# Patient Record
Sex: MALE | Race: OTHER | Employment: OTHER | URBAN - METROPOLITAN AREA
[De-identification: names, ages, dates, MRNs, and addresses within clinical notes are randomized per-mention and may not be internally consistent; named-entity substitution may affect disease eponyms.]

---

## 2023-05-01 ENCOUNTER — APPOINTMENT (OUTPATIENT)
Dept: ULTRASOUND IMAGING | Age: 68
End: 2023-05-01
Payer: COMMERCIAL

## 2023-05-01 ENCOUNTER — HOSPITAL ENCOUNTER (INPATIENT)
Age: 68
LOS: 4 days | Discharge: HOME | End: 2023-05-05
Attending: EMERGENCY MEDICINE | Admitting: EMERGENCY MEDICINE
Payer: COMMERCIAL

## 2023-05-01 ENCOUNTER — APPOINTMENT (OUTPATIENT)
Dept: CT IMAGING | Age: 68
End: 2023-05-01
Payer: COMMERCIAL

## 2023-05-01 ENCOUNTER — APPOINTMENT (OUTPATIENT)
Dept: GENERAL RADIOLOGY | Age: 68
End: 2023-05-01
Payer: COMMERCIAL

## 2023-05-01 DIAGNOSIS — M79.89 LEG SWELLING: Primary | ICD-10-CM

## 2023-05-01 DIAGNOSIS — F10.21 HISTORY OF ALCOHOLISM (HCC): ICD-10-CM

## 2023-05-01 DIAGNOSIS — R18.8 OTHER ASCITES: ICD-10-CM

## 2023-05-01 PROBLEM — K74.60 CIRRHOSIS (HCC): Status: ACTIVE | Noted: 2023-05-01

## 2023-05-01 LAB
ALBUMIN SERPL-MCNC: 2.5 G/DL (ref 3.5–5.2)
ALP SERPL-CCNC: 122 U/L (ref 40–129)
ALT SERPL-CCNC: 20 U/L (ref 0–40)
ANION GAP SERPL CALCULATED.3IONS-SCNC: 8 MMOL/L (ref 7–16)
AST SERPL-CCNC: 99 U/L (ref 0–39)
BASOPHILS # BLD: 0.04 E9/L (ref 0–0.2)
BASOPHILS NFR BLD: 0.4 % (ref 0–2)
BILIRUB DIRECT SERPL-MCNC: 2.9 MG/DL (ref 0–0.3)
BILIRUB INDIRECT SERPL-MCNC: 3.5 MG/DL (ref 0–1)
BILIRUB SERPL-MCNC: 6.4 MG/DL (ref 0–1.2)
BNP BLD-MCNC: 851 PG/ML (ref 0–125)
BUN SERPL-MCNC: 11 MG/DL (ref 6–23)
CALCIUM SERPL-MCNC: 8.7 MG/DL (ref 8.6–10.2)
CHLORIDE SERPL-SCNC: 97 MMOL/L (ref 98–107)
CO2 SERPL-SCNC: 30 MMOL/L (ref 22–29)
CREAT SERPL-MCNC: 0.5 MG/DL (ref 0.7–1.2)
EKG ATRIAL RATE: 91 BPM
EKG P AXIS: 52 DEGREES
EKG P-R INTERVAL: 142 MS
EKG Q-T INTERVAL: 418 MS
EKG QRS DURATION: 84 MS
EKG QTC CALCULATION (BAZETT): 514 MS
EKG R AXIS: 102 DEGREES
EKG T AXIS: 67 DEGREES
EKG VENTRICULAR RATE: 91 BPM
EOSINOPHIL # BLD: 0.01 E9/L (ref 0.05–0.5)
EOSINOPHIL NFR BLD: 0.1 % (ref 0–6)
ERYTHROCYTE [DISTWIDTH] IN BLOOD BY AUTOMATED COUNT: 14.6 FL (ref 11.5–15)
GLUCOSE SERPL-MCNC: 133 MG/DL (ref 74–99)
HCT VFR BLD AUTO: 35.3 % (ref 37–54)
HGB BLD-MCNC: 12 G/DL (ref 12.5–16.5)
IMM GRANULOCYTES # BLD: 0.04 E9/L
IMM GRANULOCYTES NFR BLD: 0.4 % (ref 0–5)
INR BLD: 1.8
LACTATE BLDV-SCNC: 2.6 MMOL/L (ref 0.5–2.2)
LIPASE: 31 U/L (ref 13–60)
LYMPHOCYTES # BLD: 1.62 E9/L (ref 1.5–4)
LYMPHOCYTES NFR BLD: 16.6 % (ref 20–42)
MCH RBC QN AUTO: 34.2 PG (ref 26–35)
MCHC RBC AUTO-ENTMCNC: 34 % (ref 32–34.5)
MCV RBC AUTO: 100.6 FL (ref 80–99.9)
MONOCYTES # BLD: 1.34 E9/L (ref 0.1–0.95)
MONOCYTES NFR BLD: 13.7 % (ref 2–12)
NEUTROPHILS # BLD: 6.71 E9/L (ref 1.8–7.3)
NEUTS SEG NFR BLD: 68.8 % (ref 43–80)
PLATELET # BLD AUTO: 154 E9/L (ref 130–450)
PMV BLD AUTO: 9.8 FL (ref 7–12)
POTASSIUM SERPL-SCNC: 3.3 MMOL/L (ref 3.5–5)
PROT SERPL-MCNC: 7.3 G/DL (ref 6.4–8.3)
PROTHROMBIN TIME: 19 SEC (ref 9.3–12.4)
RBC # BLD AUTO: 3.51 E12/L (ref 3.8–5.8)
SODIUM SERPL-SCNC: 135 MMOL/L (ref 132–146)
TROPONIN, HIGH SENSITIVITY: 9 NG/L (ref 0–11)
WBC # BLD: 9.8 E9/L (ref 4.5–11.5)

## 2023-05-01 PROCEDURE — 85025 COMPLETE CBC W/AUTO DIFF WBC: CPT

## 2023-05-01 PROCEDURE — 93970 EXTREMITY STUDY: CPT

## 2023-05-01 PROCEDURE — 71046 X-RAY EXAM CHEST 2 VIEWS: CPT

## 2023-05-01 PROCEDURE — 80048 BASIC METABOLIC PNL TOTAL CA: CPT

## 2023-05-01 PROCEDURE — 83690 ASSAY OF LIPASE: CPT

## 2023-05-01 PROCEDURE — 83880 ASSAY OF NATRIURETIC PEPTIDE: CPT

## 2023-05-01 PROCEDURE — 2140000000 HC CCU INTERMEDIATE R&B

## 2023-05-01 PROCEDURE — 84484 ASSAY OF TROPONIN QUANT: CPT

## 2023-05-01 PROCEDURE — 85610 PROTHROMBIN TIME: CPT

## 2023-05-01 PROCEDURE — 93005 ELECTROCARDIOGRAM TRACING: CPT | Performed by: PHYSICIAN ASSISTANT

## 2023-05-01 PROCEDURE — 99285 EMERGENCY DEPT VISIT HI MDM: CPT

## 2023-05-01 PROCEDURE — 74176 CT ABD & PELVIS W/O CONTRAST: CPT

## 2023-05-01 PROCEDURE — 93010 ELECTROCARDIOGRAM REPORT: CPT | Performed by: INTERNAL MEDICINE

## 2023-05-01 PROCEDURE — 80076 HEPATIC FUNCTION PANEL: CPT

## 2023-05-01 PROCEDURE — 83605 ASSAY OF LACTIC ACID: CPT

## 2023-05-01 RX ORDER — ENOXAPARIN SODIUM 100 MG/ML
40 INJECTION SUBCUTANEOUS DAILY
Status: DISCONTINUED | OUTPATIENT
Start: 2023-05-02 | End: 2023-05-05 | Stop reason: HOSPADM

## 2023-05-01 RX ORDER — SODIUM CHLORIDE 9 MG/ML
INJECTION, SOLUTION INTRAVENOUS PRN
Status: DISCONTINUED | OUTPATIENT
Start: 2023-05-01 | End: 2023-05-05 | Stop reason: HOSPADM

## 2023-05-01 RX ORDER — ONDANSETRON 4 MG/1
4 TABLET, ORALLY DISINTEGRATING ORAL EVERY 8 HOURS PRN
Status: DISCONTINUED | OUTPATIENT
Start: 2023-05-01 | End: 2023-05-02

## 2023-05-01 RX ORDER — ONDANSETRON 2 MG/ML
4 INJECTION INTRAMUSCULAR; INTRAVENOUS EVERY 6 HOURS PRN
Status: DISCONTINUED | OUTPATIENT
Start: 2023-05-01 | End: 2023-05-02

## 2023-05-01 RX ORDER — POLYETHYLENE GLYCOL 3350 17 G/17G
17 POWDER, FOR SOLUTION ORAL DAILY PRN
Status: DISCONTINUED | OUTPATIENT
Start: 2023-05-01 | End: 2023-05-05 | Stop reason: HOSPADM

## 2023-05-01 RX ORDER — SODIUM CHLORIDE 0.9 % (FLUSH) 0.9 %
5-40 SYRINGE (ML) INJECTION PRN
Status: DISCONTINUED | OUTPATIENT
Start: 2023-05-01 | End: 2023-05-05 | Stop reason: HOSPADM

## 2023-05-01 RX ORDER — ACETAMINOPHEN 325 MG/1
650 TABLET ORAL EVERY 6 HOURS PRN
Status: DISCONTINUED | OUTPATIENT
Start: 2023-05-01 | End: 2023-05-05 | Stop reason: HOSPADM

## 2023-05-01 RX ORDER — SODIUM CHLORIDE 0.9 % (FLUSH) 0.9 %
5-40 SYRINGE (ML) INJECTION EVERY 12 HOURS SCHEDULED
Status: DISCONTINUED | OUTPATIENT
Start: 2023-05-01 | End: 2023-05-05 | Stop reason: HOSPADM

## 2023-05-01 RX ORDER — ACETAMINOPHEN 650 MG/1
650 SUPPOSITORY RECTAL EVERY 6 HOURS PRN
Status: DISCONTINUED | OUTPATIENT
Start: 2023-05-01 | End: 2023-05-05 | Stop reason: HOSPADM

## 2023-05-01 ASSESSMENT — PAIN - FUNCTIONAL ASSESSMENT: PAIN_FUNCTIONAL_ASSESSMENT: NONE - DENIES PAIN

## 2023-05-01 ASSESSMENT — LIFESTYLE VARIABLES
HOW MANY STANDARD DRINKS CONTAINING ALCOHOL DO YOU HAVE ON A TYPICAL DAY: PATIENT DOES NOT DRINK
HOW OFTEN DO YOU HAVE A DRINK CONTAINING ALCOHOL: NEVER

## 2023-05-01 NOTE — ED PROVIDER NOTES
left pleural effusion with left basilar airspace opacities,   potentially related to atelectasis or pneumonia. Nodular opacity overlying the lower right hemithorax. This may be reflective   of a nipple shadow although a true pulmonary nodule cannot be excluded. A   follow-up chest radiograph with nipple markers or chest CT is recommended. The findings were sent to the Radiology Results [address] at 10:10   am on 5/1/2023 to be communicated to a licensed caregiver.             ------------------------- NURSING NOTES AND VITALS REVIEWED ---------------------------   The nursing notes within the ED encounter and vital signs as below have been reviewed by myself  /61   Pulse 84   Temp 98.2 °F (36.8 °C)   Resp 16   Wt 189 lb (85.7 kg)   SpO2 93%     Oxygen Saturation Interpretation: Normal    The patients available past medical records and past encounters were reviewed. ------------------------------ ED COURSE/MEDICAL DECISION MAKING----------------------  Medications - No data to display           Medical Decision Making:     Patient presents for leg swelling. Concern for DVT, CHF, ascites, among other pathologies. Hemodynamically stable, afebrile, overall well-appearing on arrival.  He had obvious ascites on my examination. Labs interpreted me shows a normal CBC, CMP shows a bilirubin of 6.4, lactic acid is 2.6, coagulation study shows an INR of 1.8. Otherwise labs reviewed as above. X-ray reviewed myself shows no obvious pneumonia or pneumothorax, radiology agrees. CT of the M pelvis shows significant ascites. Ultrasound reviewed as above. Chart reviewed. [facility]/8/2016 for elevated EtOH. He was suicidal at that time. He was cleared. Reevaluation, patient's resting comfortably.   No symptoms or complaints while at rest.  I educated him on his liver findings along with a CT, he is amenable for admission for paracentesis and

## 2023-05-01 NOTE — ED NOTES
FIRST PROVIDER CONTACT ASSESSMENT NOTE       Department of Emergency Medicine                 First Provider Note            23  9:32 AM EDT    Date of Encounter: No admission date for patient encounter. Patient Name: Coleen Mares  : 1955  MRN: [id number]    Chief Complaint: Leg Swelling (Pt states from the chest down his body is swelling. From the chest up he says he has lost muscle tone. Pt states this has been ongoing for a month. He attributed this to having a virus, but does not have a Dr. Dulce Ferrera he just let it go )      History of Present Illness:   Coleen Mares is a 74 y.o. male who presents to the ED for abdominal swelling and b/l leg swelling x 1 month. C/o BOYCE. Patient was an every day drinker of 6 shots a day of liquor until January of this year. Focused Physical Exam:  VS:    ED Triage Vitals   BP Temp Temp src Heart Rate Resp SpO2 Height Weight   -- 23 0916 -- 23 0916 23 0916 23 0916 -- 23 0927    98.2 °F (36.8 °C)  94 18 97 %  189 lb (85.7 kg)        Physical Ex: Constitutional: Alert and non-toxic. Medical History:  has no past medical history on file. Surgical History:  has no past surgical history on file. Social History:    Family History: family history is not on file. Allergies: Patient has no known allergies.      Initial Plan of Care: Initiate Treatment-Testing, Proceed toTreatment Area When Bed Available for ED Attending/MLP to Continue Care      ---END OF FIRST PROVIDER CONTACT ASSESSMENT NOTE---  Electronically signed by JUAN ALBERTO Glasgow   DD: 23       JUAN ALBERTO Glasgow  23 1296

## 2023-05-02 ENCOUNTER — APPOINTMENT (OUTPATIENT)
Dept: CT IMAGING | Age: 68
End: 2023-05-02
Payer: COMMERCIAL

## 2023-05-02 ENCOUNTER — APPOINTMENT (OUTPATIENT)
Dept: INTERVENTIONAL RADIOLOGY/VASCULAR | Age: 68
End: 2023-05-02
Payer: COMMERCIAL

## 2023-05-02 ENCOUNTER — APPOINTMENT (OUTPATIENT)
Dept: GENERAL RADIOLOGY | Age: 68
End: 2023-05-02
Payer: COMMERCIAL

## 2023-05-02 LAB
ALBUMIN FLD-MCNC: 0.6 G/DL
ALBUMIN SERPL-MCNC: 2.2 G/DL (ref 3.5–5.2)
ALP SERPL-CCNC: 98 U/L (ref 40–129)
ALT SERPL-CCNC: 16 U/L (ref 0–40)
AMYLASE FLD-CCNC: 74 U/L
ANION GAP SERPL CALCULATED.3IONS-SCNC: 9 MMOL/L (ref 7–16)
APPEARANCE FLUID: NORMAL
AST SERPL-CCNC: 87 U/L (ref 0–39)
BASOPHILS # BLD: 0.04 E9/L (ref 0–0.2)
BASOPHILS NFR BLD: 0.5 % (ref 0–2)
BILIRUB SERPL-MCNC: 4.8 MG/DL (ref 0–1.2)
BUN SERPL-MCNC: 10 MG/DL (ref 6–23)
CALCIUM SERPL-MCNC: 8.3 MG/DL (ref 8.6–10.2)
CELL COUNT FLUID TYPE: NORMAL
CHLORIDE SERPL-SCNC: 97 MMOL/L (ref 98–107)
CO2 SERPL-SCNC: 29 MMOL/L (ref 22–29)
COLOR FLUID: YELLOW
CREAT SERPL-MCNC: 0.5 MG/DL (ref 0.7–1.2)
EOSINOPHIL # BLD: 0.04 E9/L (ref 0.05–0.5)
EOSINOPHIL NFR BLD: 0.5 % (ref 0–6)
ERYTHROCYTE [DISTWIDTH] IN BLOOD BY AUTOMATED COUNT: 14.7 FL (ref 11.5–15)
FLUID TYPE: NORMAL
GLUCOSE SERPL-MCNC: 94 MG/DL (ref 74–99)
GRAM STAIN ORDERABLE: NORMAL
HCT VFR BLD AUTO: 31.3 % (ref 37–54)
HGB BLD-MCNC: 10.8 G/DL (ref 12.5–16.5)
IMM GRANULOCYTES # BLD: 0.03 E9/L
IMM GRANULOCYTES NFR BLD: 0.3 % (ref 0–5)
INR BLD: 2
LDH FLD L TO P-CCNC: 44 U/L
LDH SERPL-CCNC: 147 U/L (ref 135–225)
LYMPHOCYTES # BLD: 1.88 E9/L (ref 1.5–4)
LYMPHOCYTES NFR BLD: 21.3 % (ref 20–42)
MCH RBC QN AUTO: 34.2 PG (ref 26–35)
MCHC RBC AUTO-ENTMCNC: 34.5 % (ref 32–34.5)
MCV RBC AUTO: 99.1 FL (ref 80–99.9)
MONOCYTE, FLUID: 80 %
MONOCYTES # BLD: 1.21 E9/L (ref 0.1–0.95)
MONOCYTES NFR BLD: 13.7 % (ref 2–12)
NEUTROPHIL, FLUID: 20 %
NEUTROPHILS # BLD: 5.64 E9/L (ref 1.8–7.3)
NEUTS SEG NFR BLD: 63.7 % (ref 43–80)
NUCLEATED CELLS FLUID: 46 /UL
PLATELET # BLD AUTO: 127 E9/L (ref 130–450)
PMV BLD AUTO: 9.5 FL (ref 7–12)
POTASSIUM SERPL-SCNC: 3 MMOL/L (ref 3.5–5)
PROT FLD-MCNC: 1.6 G/DL
PROT SERPL-MCNC: 6.4 G/DL (ref 6.4–8.3)
PROT SERPL-MCNC: 6.5 G/DL (ref 6.4–8.3)
PROTHROMBIN TIME: 21.1 SEC (ref 9.3–12.4)
RBC # BLD AUTO: 3.16 E12/L (ref 3.8–5.8)
RBC FLUID: <2000 /UL
SODIUM SERPL-SCNC: 135 MMOL/L (ref 132–146)
WBC # BLD: 8.8 E9/L (ref 4.5–11.5)

## 2023-05-02 PROCEDURE — C1729 CATH, DRAINAGE: HCPCS

## 2023-05-02 PROCEDURE — 85610 PROTHROMBIN TIME: CPT

## 2023-05-02 PROCEDURE — 84155 ASSAY OF PROTEIN SERUM: CPT

## 2023-05-02 PROCEDURE — 82150 ASSAY OF AMYLASE: CPT

## 2023-05-02 PROCEDURE — 80053 COMPREHEN METABOLIC PANEL: CPT

## 2023-05-02 PROCEDURE — 87075 CULTR BACTERIA EXCEPT BLOOD: CPT

## 2023-05-02 PROCEDURE — 6360000002 HC RX W HCPCS: Performed by: EMERGENCY MEDICINE

## 2023-05-02 PROCEDURE — 2500000003 HC RX 250 WO HCPCS: Performed by: PHYSICIAN ASSISTANT

## 2023-05-02 PROCEDURE — 89051 BODY FLUID CELL COUNT: CPT

## 2023-05-02 PROCEDURE — 84157 ASSAY OF PROTEIN OTHER: CPT

## 2023-05-02 PROCEDURE — 0W9G3ZZ DRAINAGE OF PERITONEAL CAVITY, PERCUTANEOUS APPROACH: ICD-10-PCS | Performed by: RADIOLOGY

## 2023-05-02 PROCEDURE — 49083 ABD PARACENTESIS W/IMAGING: CPT

## 2023-05-02 PROCEDURE — 87205 SMEAR GRAM STAIN: CPT

## 2023-05-02 PROCEDURE — 71045 X-RAY EXAM CHEST 1 VIEW: CPT

## 2023-05-02 PROCEDURE — 6360000002 HC RX W HCPCS: Performed by: PHYSICIAN ASSISTANT

## 2023-05-02 PROCEDURE — 83615 LACTATE (LD) (LDH) ENZYME: CPT

## 2023-05-02 PROCEDURE — 85025 COMPLETE CBC W/AUTO DIFF WBC: CPT

## 2023-05-02 PROCEDURE — 6360000004 HC RX CONTRAST MEDICATION: Performed by: RADIOLOGY

## 2023-05-02 PROCEDURE — 71260 CT THORAX DX C+: CPT

## 2023-05-02 PROCEDURE — 6370000000 HC RX 637 (ALT 250 FOR IP): Performed by: EMERGENCY MEDICINE

## 2023-05-02 PROCEDURE — 36415 COLL VENOUS BLD VENIPUNCTURE: CPT

## 2023-05-02 PROCEDURE — 87070 CULTURE OTHR SPECIMN AEROBIC: CPT

## 2023-05-02 PROCEDURE — P9047 ALBUMIN (HUMAN), 25%, 50ML: HCPCS | Performed by: PHYSICIAN ASSISTANT

## 2023-05-02 PROCEDURE — 97530 THERAPEUTIC ACTIVITIES: CPT

## 2023-05-02 PROCEDURE — 82042 OTHER SOURCE ALBUMIN QUAN EA: CPT

## 2023-05-02 PROCEDURE — 97161 PT EVAL LOW COMPLEX 20 MIN: CPT

## 2023-05-02 PROCEDURE — 6370000000 HC RX 637 (ALT 250 FOR IP)

## 2023-05-02 PROCEDURE — 2580000003 HC RX 258: Performed by: RADIOLOGY

## 2023-05-02 PROCEDURE — 88305 TISSUE EXAM BY PATHOLOGIST: CPT

## 2023-05-02 PROCEDURE — 2580000003 HC RX 258: Performed by: EMERGENCY MEDICINE

## 2023-05-02 PROCEDURE — 88112 CYTOPATH CELL ENHANCE TECH: CPT

## 2023-05-02 PROCEDURE — 2140000000 HC CCU INTERMEDIATE R&B

## 2023-05-02 RX ORDER — FUROSEMIDE 10 MG/ML
40 INJECTION INTRAMUSCULAR; INTRAVENOUS ONCE
Status: COMPLETED | OUTPATIENT
Start: 2023-05-02 | End: 2023-05-02

## 2023-05-02 RX ORDER — POTASSIUM CHLORIDE 7.45 MG/ML
10 INJECTION INTRAVENOUS ONCE
Status: COMPLETED | OUTPATIENT
Start: 2023-05-02 | End: 2023-05-02

## 2023-05-02 RX ORDER — LIDOCAINE HYDROCHLORIDE 20 MG/ML
INJECTION, SOLUTION INFILTRATION; PERINEURAL PRN
Status: COMPLETED | OUTPATIENT
Start: 2023-05-02 | End: 2023-05-02

## 2023-05-02 RX ORDER — POTASSIUM CHLORIDE 20 MEQ/1
40 TABLET, EXTENDED RELEASE ORAL PRN
Status: DISCONTINUED | OUTPATIENT
Start: 2023-05-02 | End: 2023-05-05 | Stop reason: HOSPADM

## 2023-05-02 RX ORDER — SODIUM CHLORIDE 0.9 % (FLUSH) 0.9 %
10 SYRINGE (ML) INJECTION PRN
Status: DISCONTINUED | OUTPATIENT
Start: 2023-05-02 | End: 2023-05-05 | Stop reason: HOSPADM

## 2023-05-02 RX ORDER — ALBUMIN (HUMAN) 12.5 G/50ML
25 SOLUTION INTRAVENOUS ONCE
Status: COMPLETED | OUTPATIENT
Start: 2023-05-02 | End: 2023-05-02

## 2023-05-02 RX ORDER — POTASSIUM CHLORIDE 7.45 MG/ML
10 INJECTION INTRAVENOUS PRN
Status: DISCONTINUED | OUTPATIENT
Start: 2023-05-02 | End: 2023-05-05 | Stop reason: HOSPADM

## 2023-05-02 RX ORDER — POTASSIUM CHLORIDE 20 MEQ/1
20 TABLET, EXTENDED RELEASE ORAL 2 TIMES DAILY WITH MEALS
Status: DISCONTINUED | OUTPATIENT
Start: 2023-05-02 | End: 2023-05-05 | Stop reason: HOSPADM

## 2023-05-02 RX ADMIN — POTASSIUM CHLORIDE 10 MEQ: 7.45 INJECTION INTRAVENOUS at 03:44

## 2023-05-02 RX ADMIN — Medication 10 ML: at 20:39

## 2023-05-02 RX ADMIN — POTASSIUM CHLORIDE 20 MEQ: 1500 TABLET, EXTENDED RELEASE ORAL at 18:36

## 2023-05-02 RX ADMIN — POTASSIUM BICARBONATE 40 MEQ: 782 TABLET, EFFERVESCENT ORAL at 13:54

## 2023-05-02 RX ADMIN — POTASSIUM CHLORIDE 20 MEQ: 1500 TABLET, EXTENDED RELEASE ORAL at 13:54

## 2023-05-02 RX ADMIN — FUROSEMIDE 40 MG: 10 INJECTION, SOLUTION INTRAMUSCULAR; INTRAVENOUS at 03:38

## 2023-05-02 RX ADMIN — LIDOCAINE HYDROCHLORIDE 7 ML: 20 INJECTION, SOLUTION INFILTRATION; PERINEURAL at 10:52

## 2023-05-02 RX ADMIN — Medication 10 ML: at 09:09

## 2023-05-02 RX ADMIN — Medication 10 ML: at 13:55

## 2023-05-02 RX ADMIN — ALBUMIN (HUMAN) 25 G: 0.25 INJECTION, SOLUTION INTRAVENOUS at 11:38

## 2023-05-02 RX ADMIN — IOPAMIDOL 75 ML: 755 INJECTION, SOLUTION INTRAVENOUS at 09:09

## 2023-05-02 NOTE — PLAN OF CARE
Problem: Discharge Planning  Goal: Discharge to home or other facility with appropriate resources  Outcome: Progressing  Flowsheets  Taken 5/2/2023 0004  Discharge to home or other facility with appropriate resources: Identify barriers to discharge with patient and caregiver  Taken 5/1/2023 2339  Discharge to home or other facility with appropriate resources: Identify barriers to discharge with patient and caregiver     Problem: Safety - Adult  Goal: Free from fall injury  Outcome: Progressing  Flowsheets (Taken 5/2/2023 0408)  Free From Fall Injury: Instruct family/caregiver on patient safety     Problem: ABCDS Injury Assessment  Goal: Absence of physical injury  Outcome: Progressing  Flowsheets (Taken 5/2/2023 0408)  Absence of Physical Injury: Implement safety measures based on patient assessment

## 2023-05-02 NOTE — H&P (VIEW-ONLY)
frequency, urgency or hematuria  Musculoskeletal: Denies myalgias, muscle weakness, and bone pain  Neurological: Denies dizziness, vertigo, headache, and focal weakness  Psychological: Denies anxiety and depression  Endocrine: Denies heat intolerance and cold intolerance  Hematopoietic/Lymphatic: Denies bleeding problems and blood transfusions    OBJECTIVE:   BP (!) 94/41   Pulse 78   Temp 98.2 °F (36.8 °C) (Temporal)   Resp 18   Ht 5' 11\" (1.803 m)   Wt 189 lb (85.7 kg)   SpO2 98%   BMI 26.36 kg/m²   SpO2 Readings from Last 1 Encounters:   05/02/23 98%        I/O:    Intake/Output Summary (Last 24 hours) at 5/2/2023 1407  Last data filed at 5/2/2023 1150  Gross per 24 hour   Intake 99.02 ml   Output 9890 ml   Net -9790.98 ml                      Physical Exam:  General: The patient is lying in bed comfortably without any distress. Breathing is not labored  HEENT: Pupils are equal round and reactive to light, there are no oral lesions and no post-nasal drip   Neck: supple without adenopathy  Cardiovascular: regular rate and rhythm without murmur or gallop  Respiratory: Clear to auscultation bilaterally without wheezing or crackles. Air entry is symmetric  Abdomen: soft, non-tender, non-distended, normal bowel sounds  Extremities: warm, no edema, no clubbing  Skin: no rash or lesion  Neurologic: alert and oriented     Pulmonary Function Testing - none on file      Imaging personally reviewed:    Chest CT with Contrast 5/2/23:    IMPRESSION:  Moderate left pleural effusion with adjacent atelectasis. No suspicious  pulmonary nodule or mass however may be obscured due to volume of left  effusion. Visualized portions of the upper abdomen reveal at least moderate to large  volume abdominopelvic ascites partially imaged with heterogeneous appearance  of the liver concerning for underlying cirrhotic hepatic morphology.        Labs:  Lab Results   Component Value Date/Time    WBC 8.8 05/02/2023 08:49 AM    HGB

## 2023-05-02 NOTE — H&P
INTERNAL MEDICINE HISTORY AND PHYSICAL EXAM     CHIEF COMPLAINT:   Chief Complaint   Patient presents with    Leg Swelling     Pt states from the chest down his body is swelling. From the chest up he says he has lost muscle tone. Pt states this has been ongoing for a month. He attributed this to having a virus, but does not have a Dr. Chan Carter he just let it go         ASSESSMENT AND PLAN  Principal Problem:    Ascites of liver  Active Problems:    Cirrhosis ([address]  Resolved Problems:    * No resolved hospital problems. *    Decompensated liver cirrhosis  Intervention radiology consulted for diagnostic and therapeutic paracentesis  We will give Lasix now, reevaluate for continuation of IV Lasix tomorrow  He will probably need it oral diuretic regimen for volume management on discharge  Gastroenterology consultation    Lower extremity edema  Duplex ultrasound negative for DVT  Continue IV diuresis as mentioned above    Distended gallbladder  No clinical signs of acute cholecystitis  Check abdominal ultrasound  Gastroenterology consultation as mentioned above    Hypokalemia  Replace potassium    Nodular opacity lower right hemithorax/left pleural effusion with left lower lobe collapse  We will check CT chest with contrast  We will consult pulmonary medicine    Left nephrolithiasis  Will need outpatient follow-up    Colon wall thickening  Nonspecific thickening of the wall of the proximal colon  Will need colonoscopy as an outpatient    DVT prophylaxis: Subcutaneous heparin  CODE STATUS: Patient is a full code  Discharge plan: Patient can be discharged next 3 to 4 days to home with and without home health.,  Pending clinical improvement, pending work-up and clearance by consulting services. HISTORY OF PRESENTING ILLNESS:   74years old male patient with history of abdominal distention and lower extremity swelling.   Patient reports that he has history of significant alcohol intake with 4-5

## 2023-05-02 NOTE — BRIEF OP NOTE
Brief Postoperative Note  ______________________________________________________________      IR PARACENTESIS    Patient Name: Starlene Kussmaul   YOB: 1955  Medical Record Number: [id number]  Date of Procedure: 5/2/23  Room/Bed: [address] [place] [address]    Pre-operative Diagnosis: Ascites    Post-operative Diagnosis: Ascites    Consent: INFORMED CONSENT WAS OBTAINED BY patient, RISK AND BENEFITS WERE DISCUSSED. Procedure: image guided paracentesis. H and P reviewed prior to the procedure without change. Sonographic images were saved and stored in PACS. See radiology dictation in PACs for image review and additional procedural information. Performed by: JUAN ALBERTO Rotmhan under on-site supervision by Rosio Garcia MD.    Procedure: Routine scanning of all four abdominal quadrants was performed using real-time ultrasound and revealed sufficient amount of ascites fluid present. Decision was made to proceed with procedure. After obtaining consent, the patient was placed in the supine position with the head of the bed slightly elevated and the appropriate landmarks were identified. The skin over the puncture site in the right lower quadrant region was prepped with betadine and draped in a sterile fashion. Local anesthesia was obtained by infiltration using 2% Lidocaine without epinephrine. A paracentesis catheter was then advanced into the abdominal cavity over a needle and the needle was withdrawn. Fluid return was dark yellow colored. A total volume of 9100 cc was withdrawn and was processed in accordance with the ordering provider(s) requests (see Epic Orders--> Labs for laboratory order details). The catheter was then withdrawn and a sterile dressing was placed over the site. The patient tolerated the procedure well. Complications: None. Estimated Blood Loss: < 10 cc. .  Albumin 25g IV given.       Electronically signed by JUAN ALBERTO Rothman   DD: 5/2/23  11:59 AM

## 2023-05-02 NOTE — PLAN OF CARE
Problem: Safety - Adult  Goal: Free from fall injury  5/2/2023 1613 by Trista Justice RN  Outcome: Progressing     Problem: ABCDS Injury Assessment  Goal: Absence of physical injury  5/2/2023 1613 by Trista Justice RN  Outcome: Progressing

## 2023-05-02 NOTE — PATIENT CARE CONFERENCE
[facility] Quality Flow/Interdisciplinary Rounds Progress Note        Quality Flow Rounds held on May 2, 2023    Disciplines Attending:  Bedside Nurse, , , and Nursing Unit Leadership    May Fernández was admitted on 5/1/2023  1:29 PM    Anticipated Discharge Date:       Disposition:    Brian Score:  Brian Scale Score: 19    Readmission Risk              Risk of Unplanned Readmission:  10           Discussed patient goal for the day, patient clinical progression, and barriers to discharge.   The following Goal(s) of the Day/Commitment(s) have been identified:  Diagnostics - Report Results      Lupe Schultz [facility]  May 2, 2023

## 2023-05-02 NOTE — CONSULTS
Thanh Alcocer M.D.,FCCP [place] ANGELES., Clayton Gipson M.D. Henry Corona M.D. Alex Prajapati D.O. Patient:  Preethi Vazquez 74 y.o. male MRN: [id number]     Date of Service: 2023      PULMONARY CONSULTATION    Reason for Consultation: lung nodule, pleural effusion, history of smoking, please evaluate    Referring Physician: Gabriela Dawson M.D. Communication with the referring physician will be sent via the electronic medical record. Chief Complaint: leg swelling    CODE STATUS: Full code    SUBJECTIVE:  HPI:  Preethi Vazquez is a 74 y.o. male not known to us with a PMH of prostate cancer we were asked to see regarding pulmonary nodules, pleural effusion and history of smoking. Tiki Tolbert states he started to noticed swelling from the chest down with loss of muscle tone for about a month. At first, he thought it was just water weight so he though nothing of it. He states that when his wife  in , he started drinking about 6 shots a day but he quit on 2023. He does admit to smoking cigarettes 0.5 ppd. CT imaging done showed a large amount of ascites in the abdomen as well as a moderate left pleural effusion. Tiki Tolbert had a paracentesis done today in  and had 9100 ml of fluid removed. I have ordered a repeat CXR to re-evaluate the effusion to see if that still to be addressed. Past Medical History:   Diagnosis Date    Prostate cancer ([facility] [address]        No past surgical history on file. No family history on file.     Social History:   Social History     Socioeconomic History    Marital status: Single     Spouse name: Not on file    Number of children: Not on file    Years of education: Not on file    Highest education level: Not on file   Occupational History    Not on file   Tobacco Use    Smoking status: Not on file    Smokeless tobacco: Not on file   Substance and Sexual Activity    Alcohol use: Not on file    Drug use: Not on file

## 2023-05-02 NOTE — INTERVAL H&P NOTE
IR H&P UPDATE NOTE    Patient's History and Physical Examination were reviewed from current hospital admission records. Starlene Kussmaul appears likely to able to tolerate the requested Paracentesis procedure by the consultant. Risk and benefits were discussed with patient including ultimate complications, possibly death and consent was obtained.     Yoni Rothman MD.

## 2023-05-02 NOTE — PROCEDURES
Patient arrived to radiology department for paracentesis. Allergies, medications, H&P and fasting instructions reviewed. Vital signs taken. IV flushed and prn adapter attached. Procedural instructions given, questions answered, understanding expressed and consent signed.  No questions at this time

## 2023-05-03 ENCOUNTER — APPOINTMENT (OUTPATIENT)
Dept: ULTRASOUND IMAGING | Age: 68
End: 2023-05-03
Payer: COMMERCIAL

## 2023-05-03 LAB
ALBUMIN SERPL-MCNC: 2 G/DL (ref 3.5–5.2)
ALP SERPL-CCNC: 98 U/L (ref 40–129)
ALT SERPL-CCNC: 15 U/L (ref 0–40)
AMMONIA PLAS-SCNC: 55 UMOL/L (ref 16–60)
ANION GAP SERPL CALCULATED.3IONS-SCNC: 5 MMOL/L (ref 7–16)
AST SERPL-CCNC: 73 U/L (ref 0–39)
BASOPHILS # BLD: 0.06 E9/L (ref 0–0.2)
BASOPHILS NFR BLD: 0.9 % (ref 0–2)
BILIRUB SERPL-MCNC: 3.1 MG/DL (ref 0–1.2)
BUN SERPL-MCNC: 10 MG/DL (ref 6–23)
CALCIUM SERPL-MCNC: 8.2 MG/DL (ref 8.6–10.2)
CHLORIDE SERPL-SCNC: 101 MMOL/L (ref 98–107)
CO2 SERPL-SCNC: 31 MMOL/L (ref 22–29)
CREAT SERPL-MCNC: 0.6 MG/DL (ref 0.7–1.2)
EOSINOPHIL # BLD: 0.05 E9/L (ref 0.05–0.5)
EOSINOPHIL NFR BLD: 0.7 % (ref 0–6)
ERYTHROCYTE [DISTWIDTH] IN BLOOD BY AUTOMATED COUNT: 14.7 FL (ref 11.5–15)
GLUCOSE SERPL-MCNC: 89 MG/DL (ref 74–99)
HCT VFR BLD AUTO: 29.1 % (ref 37–54)
HGB BLD-MCNC: 10.2 G/DL (ref 12.5–16.5)
IMM GRANULOCYTES # BLD: 0.02 E9/L
IMM GRANULOCYTES NFR BLD: 0.3 % (ref 0–5)
LV EF: 63 %
LVEF MODALITY: NORMAL
LYMPHOCYTES # BLD: 2.25 E9/L (ref 1.5–4)
LYMPHOCYTES NFR BLD: 32.7 % (ref 20–42)
MCH RBC QN AUTO: 34.7 PG (ref 26–35)
MCHC RBC AUTO-ENTMCNC: 35.1 % (ref 32–34.5)
MCV RBC AUTO: 99 FL (ref 80–99.9)
MONOCYTES # BLD: 1.17 E9/L (ref 0.1–0.95)
MONOCYTES NFR BLD: 17 % (ref 2–12)
NEUTROPHILS # BLD: 3.34 E9/L (ref 1.8–7.3)
NEUTS SEG NFR BLD: 48.4 % (ref 43–80)
PLATELET # BLD AUTO: 112 E9/L (ref 130–450)
PMV BLD AUTO: 9.8 FL (ref 7–12)
POTASSIUM SERPL-SCNC: 3.3 MMOL/L (ref 3.5–5)
PROT SERPL-MCNC: 5.8 G/DL (ref 6.4–8.3)
RBC # BLD AUTO: 2.94 E12/L (ref 3.8–5.8)
SODIUM SERPL-SCNC: 137 MMOL/L (ref 132–146)
WBC # BLD: 6.9 E9/L (ref 4.5–11.5)

## 2023-05-03 PROCEDURE — 93306 TTE W/DOPPLER COMPLETE: CPT

## 2023-05-03 PROCEDURE — 6370000000 HC RX 637 (ALT 250 FOR IP)

## 2023-05-03 PROCEDURE — 82140 ASSAY OF AMMONIA: CPT

## 2023-05-03 PROCEDURE — 6370000000 HC RX 637 (ALT 250 FOR IP): Performed by: INTERNAL MEDICINE

## 2023-05-03 PROCEDURE — 2580000003 HC RX 258: Performed by: EMERGENCY MEDICINE

## 2023-05-03 PROCEDURE — 76700 US EXAM ABDOM COMPLETE: CPT

## 2023-05-03 PROCEDURE — 85025 COMPLETE CBC W/AUTO DIFF WBC: CPT

## 2023-05-03 PROCEDURE — 2140000000 HC CCU INTERMEDIATE R&B

## 2023-05-03 PROCEDURE — 36415 COLL VENOUS BLD VENIPUNCTURE: CPT

## 2023-05-03 PROCEDURE — 6370000000 HC RX 637 (ALT 250 FOR IP): Performed by: EMERGENCY MEDICINE

## 2023-05-03 PROCEDURE — 6360000002 HC RX W HCPCS: Performed by: EMERGENCY MEDICINE

## 2023-05-03 PROCEDURE — 80053 COMPREHEN METABOLIC PANEL: CPT

## 2023-05-03 RX ORDER — SPIRONOLACTONE 25 MG/1
25 TABLET ORAL DAILY
Status: DISCONTINUED | OUTPATIENT
Start: 2023-05-03 | End: 2023-05-05 | Stop reason: HOSPADM

## 2023-05-03 RX ORDER — PHYTONADIONE 5 MG/1
10 TABLET ORAL ONCE
Status: COMPLETED | OUTPATIENT
Start: 2023-05-03 | End: 2023-05-03

## 2023-05-03 RX ADMIN — POTASSIUM CHLORIDE 20 MEQ: 1500 TABLET, EXTENDED RELEASE ORAL at 17:30

## 2023-05-03 RX ADMIN — POTASSIUM CHLORIDE 20 MEQ: 1500 TABLET, EXTENDED RELEASE ORAL at 08:35

## 2023-05-03 RX ADMIN — ENOXAPARIN SODIUM 40 MG: 100 INJECTION SUBCUTANEOUS at 08:35

## 2023-05-03 RX ADMIN — SPIRONOLACTONE 25 MG: 25 TABLET ORAL at 11:59

## 2023-05-03 RX ADMIN — PHYTONADIONE 10 MG: 5 TABLET ORAL at 11:59

## 2023-05-03 RX ADMIN — POTASSIUM BICARBONATE 40 MEQ: 782 TABLET, EFFERVESCENT ORAL at 08:35

## 2023-05-03 RX ADMIN — Medication 10 ML: at 20:15

## 2023-05-03 RX ADMIN — Medication 10 ML: at 08:35

## 2023-05-03 NOTE — PLAN OF CARE
Problem: Discharge Planning  Goal: Discharge to home or other facility with appropriate resources  Outcome: Progressing  Flowsheets (Taken 5/2/2023 2000)  Discharge to home or other facility with appropriate resources: Identify barriers to discharge with patient and caregiver     Problem: Safety - Adult  Goal: Free from fall injury  5/3/2023 0543 by Artis Garcia RN  Outcome: Progressing  Flowsheets  Taken 5/3/2023 0126  Free From Fall Injury: Instruct family/caregiver on patient safety  Taken 5/2/2023 2000  Free From Fall Injury: Instruct family/caregiver on patient safety  5/2/2023 1613 by Trista Justice RN  Outcome: Progressing     Problem: ABCDS Injury Assessment  Goal: Absence of physical injury  5/3/2023 0543 by Artis Garcia RN  Outcome: Progressing  Flowsheets  Taken 5/3/2023 0126  Absence of Physical Injury: Implement safety measures based on patient assessment  Taken 5/2/2023 2000  Absence of Physical Injury: Implement safety measures based on patient assessment  5/2/2023 1613 by Trista Justice RN  Outcome: Progressing

## 2023-05-03 NOTE — PLAN OF CARE
Problem: Discharge Planning  Goal: Discharge to home or other facility with appropriate resources  5/3/2023 1328 by Macho Ayala RN  Outcome: Progressing     Problem: Safety - Adult  Goal: Free from fall injury  5/3/2023 1328 by Macho Ayala RN  Outcome: Progressing

## 2023-05-04 ENCOUNTER — APPOINTMENT (OUTPATIENT)
Dept: INTERVENTIONAL RADIOLOGY/VASCULAR | Age: 68
End: 2023-05-04
Payer: COMMERCIAL

## 2023-05-04 LAB
ALBUMIN SERPL-MCNC: 2.1 G/DL (ref 3.5–5.2)
ALP SERPL-CCNC: 105 U/L (ref 40–129)
ALT SERPL-CCNC: 15 U/L (ref 0–40)
ANION GAP SERPL CALCULATED.3IONS-SCNC: 9 MMOL/L (ref 7–16)
APPEARANCE FLUID: NORMAL
AST SERPL-CCNC: 78 U/L (ref 0–39)
BASOPHILS # BLD: 0.06 E9/L (ref 0–0.2)
BASOPHILS NFR BLD: 0.7 % (ref 0–2)
BILIRUB SERPL-MCNC: 3.3 MG/DL (ref 0–1.2)
BUN SERPL-MCNC: 9 MG/DL (ref 6–23)
CALCIUM SERPL-MCNC: 8.1 MG/DL (ref 8.6–10.2)
CELL COUNT FLUID TYPE: NORMAL
CHLORIDE SERPL-SCNC: 101 MMOL/L (ref 98–107)
CO2 SERPL-SCNC: 26 MMOL/L (ref 22–29)
COLOR FLUID: NORMAL
CREAT SERPL-MCNC: 0.6 MG/DL (ref 0.7–1.2)
EOSINOPHIL # BLD: 0.13 E9/L (ref 0.05–0.5)
EOSINOPHIL NFR BLD: 1.6 % (ref 0–6)
ERYTHROCYTE [DISTWIDTH] IN BLOOD BY AUTOMATED COUNT: 14.9 FL (ref 11.5–15)
FLUID TYPE: NORMAL
GLUCOSE FLD-MCNC: 92 MG/DL
GLUCOSE SERPL-MCNC: 84 MG/DL (ref 74–99)
HCT VFR BLD AUTO: 32.9 % (ref 37–54)
HGB BLD-MCNC: 11.4 G/DL (ref 12.5–16.5)
IMM GRANULOCYTES # BLD: 0.04 E9/L
IMM GRANULOCYTES NFR BLD: 0.5 % (ref 0–5)
INR BLD: 1.9
LDH FLD L TO P-CCNC: 81 U/L
LYMPHOCYTES # BLD: 2.3 E9/L (ref 1.5–4)
LYMPHOCYTES NFR BLD: 27.7 % (ref 20–42)
MCH RBC QN AUTO: 34.4 PG (ref 26–35)
MCHC RBC AUTO-ENTMCNC: 34.7 % (ref 32–34.5)
MCV RBC AUTO: 99.4 FL (ref 80–99.9)
MONOCYTE, FLUID: 90 %
MONOCYTES # BLD: 1.02 E9/L (ref 0.1–0.95)
MONOCYTES NFR BLD: 12.3 % (ref 2–12)
NEUTROPHIL, FLUID: 10 %
NEUTROPHILS # BLD: 4.76 E9/L (ref 1.8–7.3)
NEUTS SEG NFR BLD: 57.2 % (ref 43–80)
NUCLEATED CELLS FLUID: 678 /UL
PLATELET # BLD AUTO: 114 E9/L (ref 130–450)
PMV BLD AUTO: 10 FL (ref 7–12)
POTASSIUM SERPL-SCNC: 4.4 MMOL/L (ref 3.5–5)
PROT FLD-MCNC: 2.3 G/DL
PROT SERPL-MCNC: 6.2 G/DL (ref 6.4–8.3)
PROTHROMBIN TIME: 20.7 SEC (ref 9.3–12.4)
RBC # BLD AUTO: 3.31 E12/L (ref 3.8–5.8)
RBC FLUID: NORMAL /UL
SODIUM SERPL-SCNC: 136 MMOL/L (ref 132–146)
WBC # BLD: 8.3 E9/L (ref 4.5–11.5)

## 2023-05-04 PROCEDURE — C1729 CATH, DRAINAGE: HCPCS

## 2023-05-04 PROCEDURE — 97530 THERAPEUTIC ACTIVITIES: CPT

## 2023-05-04 PROCEDURE — 87070 CULTURE OTHR SPECIMN AEROBIC: CPT

## 2023-05-04 PROCEDURE — 82947 ASSAY GLUCOSE BLOOD QUANT: CPT

## 2023-05-04 PROCEDURE — 6370000000 HC RX 637 (ALT 250 FOR IP): Performed by: EMERGENCY MEDICINE

## 2023-05-04 PROCEDURE — 89051 BODY FLUID CELL COUNT: CPT

## 2023-05-04 PROCEDURE — 2140000000 HC CCU INTERMEDIATE R&B

## 2023-05-04 PROCEDURE — 87205 SMEAR GRAM STAIN: CPT

## 2023-05-04 PROCEDURE — 32555 ASPIRATE PLEURA W/ IMAGING: CPT

## 2023-05-04 PROCEDURE — 83615 LACTATE (LD) (LDH) ENZYME: CPT

## 2023-05-04 PROCEDURE — 6370000000 HC RX 637 (ALT 250 FOR IP): Performed by: INTERNAL MEDICINE

## 2023-05-04 PROCEDURE — 85025 COMPLETE CBC W/AUTO DIFF WBC: CPT

## 2023-05-04 PROCEDURE — 2500000003 HC RX 250 WO HCPCS: Performed by: PHYSICIAN ASSISTANT

## 2023-05-04 PROCEDURE — 85610 PROTHROMBIN TIME: CPT

## 2023-05-04 PROCEDURE — 84157 ASSAY OF PROTEIN OTHER: CPT

## 2023-05-04 PROCEDURE — 80053 COMPREHEN METABOLIC PANEL: CPT

## 2023-05-04 PROCEDURE — 87075 CULTR BACTERIA EXCEPT BLOOD: CPT

## 2023-05-04 PROCEDURE — 2580000003 HC RX 258: Performed by: EMERGENCY MEDICINE

## 2023-05-04 PROCEDURE — 0W9B3ZZ DRAINAGE OF LEFT PLEURAL CAVITY, PERCUTANEOUS APPROACH: ICD-10-PCS | Performed by: RADIOLOGY

## 2023-05-04 PROCEDURE — 36415 COLL VENOUS BLD VENIPUNCTURE: CPT

## 2023-05-04 RX ORDER — LIDOCAINE HYDROCHLORIDE 20 MG/ML
INJECTION, SOLUTION INFILTRATION; PERINEURAL PRN
Status: COMPLETED | OUTPATIENT
Start: 2023-05-04 | End: 2023-05-04

## 2023-05-04 RX ADMIN — Medication 10 ML: at 20:04

## 2023-05-04 RX ADMIN — POTASSIUM CHLORIDE 20 MEQ: 1500 TABLET, EXTENDED RELEASE ORAL at 17:43

## 2023-05-04 RX ADMIN — Medication 10 ML: at 10:09

## 2023-05-04 RX ADMIN — POTASSIUM CHLORIDE 20 MEQ: 1500 TABLET, EXTENDED RELEASE ORAL at 10:06

## 2023-05-04 RX ADMIN — LIDOCAINE HYDROCHLORIDE 6 ML: 20 INJECTION, SOLUTION INFILTRATION; PERINEURAL at 09:23

## 2023-05-04 RX ADMIN — SPIRONOLACTONE 25 MG: 25 TABLET ORAL at 10:06

## 2023-05-04 NOTE — PLAN OF CARE
Problem: Discharge Planning  Goal: Discharge to home or other facility with appropriate resources  5/4/2023 1555 by Zion Sanchez RN  Outcome: Progressing  Flowsheets (Taken 5/4/2023 1530)  Discharge to home or other facility with appropriate resources: Identify barriers to discharge with patient and caregiver  5/4/2023 0840 by Mirtha Barcenas RN  Outcome: Progressing     Problem: Safety - Adult  Goal: Free from fall injury  5/4/2023 1555 by Zion Sanchez RN  Outcome: Progressing  5/4/2023 0840 by Mirtha Barcenas RN  Outcome: Progressing     Problem: ABCDS Injury Assessment  Goal: Absence of physical injury  5/4/2023 1555 by Zion Sanchez RN  Outcome: Progressing  5/4/2023 0840 by Mirtha Barcenas RN  Outcome: Progressing

## 2023-05-04 NOTE — BRIEF OP NOTE
Brief Postoperative Note  ____________________________________________________________      Francisca Jackson    Patient Name: Miquel Harvey   YOB: 1955  Medical Record Number: [id number]  Date of Procedure: 5/4/23  Room/Bed: [address] [place] [address]      Procedure:  Image Guided Thoracentesis. Indication: left Pleural Effusion    Consent: The patient was counseled regarding the procedure, it's indications, risks, potential complications and alternatives and any questions were answered. Consent was obtained for image guided left thoracentesis for removal of fluid for diagnostic purposes. Performed by: JUAN ALBERTO Torres under on-site supervision by Lisa Grider MD.     Procedure: Prior to start of procedure, routine scanning of the posterior thorax was performed using real-time ultrasound and revealed sufficient amount of pleural fluid present. Decision was made to proceed with procedure. After obtaining consent, the patient was placed in the sitting position and the appropriate landmarks were identified. The skin over the puncture site in the left lower posterior hemithorax region was prepped with surgical skin prep and draped in a sterile fashion. Local anesthesia was administered by infiltration using 1% Lidocaine without epinephrine. A 6-Fr thoracocentesis catheter was then advanced into the pleural cavity over a needle and the needle was withdrawn. Pleural fluid return was serosanguinous colored. A total volume of 900 cc was withdrawn which was sent to the lab for appropriate testing. The catheter was then withdrawn and a sterile dressing was placed over the site. The patient tolerated the procedure well. Complications: None. Estimated Blood Loss: < 10 cc.         Electronically signed by JUAN ALBERTO Torres   DD: 5/4/23  10:18 AM

## 2023-05-04 NOTE — INTERVAL H&P NOTE
IR H&P UPDATE NOTE    Patient's History and Physical Examination were reviewed from current hospital admission records. Symone Shay appears likely to able to tolerate the requested Thoracentesis procedure by the consultant. Risk and benefits were discussed with patient including ultimate complications, possibly death and consent was obtained.     Yoni Soto MD.

## 2023-05-04 NOTE — PLAN OF CARE
Problem: Discharge Planning  Goal: Discharge to home or other facility with appropriate resources  5/3/2023 2313 by Xiang Nolen RN  Outcome: Progressing     Problem: Safety - Adult  Goal: Free from fall injury  5/3/2023 2313 by Xiang Nolen RN  Outcome: Progressing     Problem: ABCDS Injury Assessment  Goal: Absence of physical injury  Outcome: Progressing

## 2023-05-04 NOTE — OR NURSING
Thoracentesis catheter removed at this time, Vaseline gauze, 4x4, and tegaderm applied to site, site clean and dry

## 2023-05-04 NOTE — PROCEDURES
Patient arrived to radiology department for left thoracentesis. Allergies, medications, H&P and fasting instructions reviewed. Vital signs taken. IV flushed and prn adapter attached. Procedural instructions given, questions answered, understanding expressed and consent signed.  No questions at this time

## 2023-05-04 NOTE — PATIENT CARE CONFERENCE
[facility] Quality Flow/Interdisciplinary Rounds Progress Note        Quality Flow Rounds held on May 4, 2023    Disciplines Attending:  Bedside Nurse, , , and Nursing Unit Leadership    Preethi Vazquez was admitted on 5/1/2023  1:29 PM    Anticipated Discharge Date:       Disposition:    Brian Score:  Brian Scale Score: 20    Readmission Risk              Risk of Unplanned Readmission:  10           Discussed patient goal for the day, patient clinical progression, and barriers to discharge.   The following Goal(s) of the Day/Commitment(s) have been identified:  Diagnostics - Report Results and Labs - Report Results      Vilma Boudreaux RN  May 4, 2023

## 2023-05-05 VITALS
RESPIRATION RATE: 16 BRPM | SYSTOLIC BLOOD PRESSURE: 117 MMHG | HEIGHT: 71 IN | HEART RATE: 80 BPM | DIASTOLIC BLOOD PRESSURE: 58 MMHG | TEMPERATURE: 97.9 F | OXYGEN SATURATION: 100 % | WEIGHT: 189 LBS | BODY MASS INDEX: 26.46 KG/M2

## 2023-05-05 LAB — GRAM STAIN ORDERABLE: NORMAL

## 2023-05-05 PROCEDURE — 6360000002 HC RX W HCPCS: Performed by: EMERGENCY MEDICINE

## 2023-05-05 PROCEDURE — 2580000003 HC RX 258: Performed by: EMERGENCY MEDICINE

## 2023-05-05 PROCEDURE — 6370000000 HC RX 637 (ALT 250 FOR IP): Performed by: EMERGENCY MEDICINE

## 2023-05-05 PROCEDURE — 6370000000 HC RX 637 (ALT 250 FOR IP): Performed by: INTERNAL MEDICINE

## 2023-05-05 RX ORDER — SPIRONOLACTONE 25 MG/1
25 TABLET ORAL DAILY
Qty: 30 TABLET | Refills: 0 | Status: SHIPPED | OUTPATIENT
Start: 2023-05-06 | End: 2023-05-11

## 2023-05-05 RX ORDER — POLYETHYLENE GLYCOL 3350 17 G/17G
17 POWDER, FOR SOLUTION ORAL DAILY PRN
Qty: 527 G | Refills: 0 | Status: SHIPPED | OUTPATIENT
Start: 2023-05-05 | End: 2023-06-04

## 2023-05-05 RX ADMIN — Medication 10 ML: at 07:45

## 2023-05-05 RX ADMIN — POTASSIUM CHLORIDE 20 MEQ: 1500 TABLET, EXTENDED RELEASE ORAL at 07:44

## 2023-05-05 RX ADMIN — SPIRONOLACTONE 25 MG: 25 TABLET ORAL at 07:44

## 2023-05-05 RX ADMIN — ENOXAPARIN SODIUM 40 MG: 100 INJECTION SUBCUTANEOUS at 07:44

## 2023-05-05 NOTE — DISCHARGE SUMMARY
Hospitalist Discharge Summary    Patient ID: Macy Hernandez   Patient : 1955  Patient's PCP: No primary care provider on file. Admit Date: 2023   Admitting Physician: Tory Vega MD    Discharge Date:  2023   Discharge Physician: Zainab Miller MD   Discharge Condition: Stable  Discharge Disposition: [facility] course in brief:  (Please refer to daily progress notes for a comprehensive review of the hospitalization by requesting medical records)    74years old male patient with history of abdominal distention and lower extremity swelling. Patient reports that he has history of significant alcohol intake with 4-5 drinks of hard liquor every day and he quit in January. Patient does not have any known past medical history otherwise. Patient presented to emergency department with this abdominal distention and lower extremity swelling he otherwise has no acute complaints as such. Patient remained hemodynamically stable initial lab work-up showed elevated bilirubin hemoglobin of 12.0 otherwise no major lab abnormality. CT abdominal pelvis showing liver cirrhosis distended gallbladder noted to be opacity and left pleural effusion. Incidental finding of left nephrolithiasis and proximal colon wall thickening. Lower extremity ultrasound was negative for DVT. He is being admitted to hospital medicine service for further care. Patient underwent paracentesis and thoracentesis with improvement of his symptoms. Started on aldactone. Today patient is seen at bedside and has no new concerns. Discussed with patient that pathology report is pending on discharge. SW coordinated a PCP as patient has not seen one in years. Attached GI Dr Katty Diazial office information for outpatient follow up and surveillance of liver cirrhosis.  Patient agreed with plan and is DC to home on stable condition    Exam:  General: NAD  Neck: no JVD  Resp: CTAB  CVS: RRR  Abdomen NT, soft, NT, + BS  Ext: no

## 2023-05-05 NOTE — PLAN OF CARE
Problem: Discharge Planning  Goal: Discharge to home or other facility with appropriate resources  5/4/2023 2151 by Zehng Andrade RN  Outcome: Progressing     Problem: Safety - Adult  Goal: Free from fall injury  5/4/2023 2151 by Zheng Andrade RN  Outcome: Progressing     Problem: ABCDS Injury Assessment  Goal: Absence of physical injury  5/4/2023 2151 by Zheng Andrade RN  Outcome: Progressing

## 2023-05-05 NOTE — PROGRESS NOTES
2D echo completed by Roland Coffman
CLINICAL PHARMACY NOTE: MEDS TO BEDS    Total # of Prescriptions Filled: 2   The following medications were delivered to the patient:  ALDACTONE 25 MG   MIRALAX     Additional Documentation:   DELIVERED TO PT @ 12:50PM
Gastroenterology and Pulm. Consulted via perfect serve.
Hospitalist Progress Note      Synopsis: Patient admitted on 5/1/2023   22-year-old man with a history of abdominal distention lower extremity swelling with history of alcohol intake that he quit in January. Came in with abdominal distention and lower extremity edema. Significant mild hyperbilirubinemia and imaging showed liver cirrhosis and left-sided pleural effusion and of course left nephrolithiasis and proximal colon wall thickening. He had 1 paracentesis done with 9100 mL fluid removal.  Now also being seen by pulmonary because of the pleural effusion. Subjective    Clinically improving. Feeling better. Stable overnight. No other overnight issues reported. Exam:  BP (!) 96/50   Pulse 76   Temp 98.1 °F (36.7 °C)   Resp 18   Ht 5' 11\" (1.803 m)   Wt 189 lb (85.7 kg)   SpO2 96%   BMI 26.36 kg/m²   General appearance: No apparent distress, appears stated age and cooperative. HEENT: Pupils equal, round, and reactive to light. Conjunctivae/corneas clear. Neck: Trachea midline. Respiratory: Distant  Cardiovascular:rrr  . Abdomen: Soft, non-tender, non-distended with normal bowel sounds. Positive ascites mild  Musculoskeletal: No clubbing, cyanosis 2+  edema bilaterally.    Skin:  No rashes    Neurologic: awake, alert and following commands     Medications:  Reviewed    Infusion Medications    sodium chloride       Scheduled Medications    phytonadione  10 mg Oral Once    potassium chloride  20 mEq Oral BID WC    sodium chloride flush  5-40 mL IntraVENous 2 times per day    enoxaparin  40 mg SubCUTAneous Daily     PRN Meds: sodium chloride flush, potassium chloride **OR** potassium alternative oral replacement **OR** potassium chloride, sodium chloride flush, sodium chloride, polyethylene glycol, acetaminophen **OR** acetaminophen    I/O    Intake/Output Summary (Last 24 hours) at 5/3/2023 1128  Last data filed at 5/3/2023 1032  Gross per 24 hour   Intake 250 ml   Output 9440 ml   Net
Isabel Aguayo MD messaged regarding patient diet.
Patient ate at [phone]. Making patient NPO after midnight for ultrasound tomorrow 5/3.
Patient states he is on no home medications. Patient states he has not been to a doctor in a long time. Patient arrived to unit from ER with jacket, flannel, shirt, undergarments, short, footwear, wallet, cell phone, key, and glasses. Patient wishes to stay in street clothes and does not want to wear hospital gown at this time.
Per pulmonary request, attending notified via perfect serve if patient can be discharged today.
Per pulmonary request, attending notified via perfect serve to come speak with patient and family regarding discharge.
Physical Therapy    Initial Assessment     Name: Samson Mcdaniels  : 1955  MRN: [id number]      Date of Service: 2023    Evaluating PT: Dena Signs, PT, DPT [id number]      Room #:  4126/4864-Q  Diagnosis:  Cirrhosis ([facility] [K74.60]  Leg swelling [M79.89]  Other ascites [R18.8]  History of alcoholism ([place] [address] [F10.21]  Ascites of liver [R18.8]  PMHx/PSHx:   has a past medical history of Prostate cancer ([facility] Precautions:  Fall risk    SUBJECTIVE:    Pt lives alone in a 2 story townhouse with level entry. Full flight of stairs and 1 rail to second floor bed and bath. Pt ambulated without AD prior to admission. OBJECTIVE:   Initial Evaluation  Date: 23 Treatment Date: Short Term/ Long Term   Goals   AM-PAC 6 Clicks 72/86     Was pt agreeable to Eval/treatment? Yes     Does pt have pain? No complaints of pain     Bed Mobility  Rolling: NT  Supine to sit: Independent   Sit to supine: Independent   Scooting: Independent   NA   Transfers Sit to stand: SBA  Stand to sit: SBA  Stand pivot: SBA without AD  Sit to stand: Independent   Stand to sit: Independent   Stand pivot: Independent    Ambulation   50, 100 feet without AD with SBA  >400 feet Independent    Stair negotiation: ascended and descended 4 step(s) with 1 rail(s) with SBA  >12 step(s) with 1 rail(s) Mod Independent    ROM BUE: Refer to OT note  BLE: WFL     Strength BUE: Refer to OT note  BLE: WFL     Balance Sitting EOB: Independent   Dynamic Standing: SBA without AD  Dynamic Standing: Independent      Pt is A & O x: 4 to person, place, month/year, and situation. Sensation: Pt denies numbness and tingling of extremities. Edema: B LE swelling noted. Patient education  Pt educated on PT role in acute care setting.     Patient response to education:   Pt verbalized understanding Pt demonstrated skill Pt requires further education in this area   Yes NA No     ASSESSMENT:    Conditions Requiring Skilled Therapeutic Intervention:    [x]Decreased
Pt discharged at this time. D/c packet was provided and all questions answered to satisfaction.
Spoke with IR, due to lovenox being given today unable to do procedure.  Will hold lovenox for tomorrow 5/4
Spoke with RN regarding left thoracentesis today, patient able to sign consent, will call when able to send for patient.
Spoke with RN regarding paracentesis today. Hold lovenox this am per ordering provider. Patient is able to sign consent. Will call when able to send for patient.
Wiley for diet per Dr. Maria Teresa Martinez
Medications    sodium chloride       Scheduled Medications    potassium chloride  20 mEq Oral BID     sodium chloride flush  5-40 mL IntraVENous 2 times per day    enoxaparin  40 mg SubCUTAneous Daily     PRN Meds: sodium chloride flush, sodium chloride flush, sodium chloride, polyethylene glycol, acetaminophen **OR** acetaminophen    Labs:     Recent Labs     05/01/23  0943 05/02/23  0849   WBC 9.8 8.8   HGB 12.0* 10.8*   HCT 35.3* 31.3*    127*       Recent Labs     05/01/23  0943 05/02/23  0849    135   K 3.3* 3.0*   CL 97* 97*   CO2 30* 29   BUN 11 10   CREATININE 0.5* 0.5*   CALCIUM 8.7 8.3*       Recent Labs     05/01/23  0943 05/02/23  0849   PROT 7.3 6.5   ALKPHOS 122 98   ALT 20 16   AST 99* 87*   BILITOT 6.4* 4.8*   LIPASE 31  --        Recent Labs     05/01/23  1353   INR 1.8       No results for input(s): CKTOTAL, TROPONINI in the last 72 hours. Chronic labs:    Lab Results   Component Value Date    INR 1.8 05/01/2023       Radiology: REVIEWED DAILY    Assessment:  Ascites likely secondary to decompensated liver cirrhosis  Lower extremity edema likely secondary to above  Distended gallbladder  Nodular opacity in right lower hemothorax  Left pleural effusion with left lower lobe collapse  Left nephrolithiasis  Colon wall thickening  Hypokalemia    Plan:  Reviewed CT of abdomen and pelvis, revealing liver cirrhosis, distended gallbladder, left pleural effusion, left nephrolithiasis, proximal colon wall thickening. In light of findings agree with consult for interventional radiology for paracentesis, additionally will arrange follow-up outpatient with gastroenterology for possible outpatient colonoscopy. As well as further evaluation of new diagnosis of liver cirrhosis. Paracentesis completed today we will consider additional IV Lasix. We will need oral diuretic on discharge. Reviewed CT of chest that revealed pulmonary nodule agree with consultation placed for pulmonology.
month/year, and situation. Sensation: Pt denies numbness and tingling of extremities. Edema: Generalized swelling has improved since initial evaluation. Patient education  Pt educated on PT role in acute care setting. Patient response to education:   Pt verbalized understanding Pt demonstrated skill Pt requires further education in this area   Yes NA No     ASSESSMENT:    Comments:    Pt was in bed upon room entry; agreeable to PT treatment. Pt ambulated around unit. Gait was steady. Pt negotiated steps and was also steady. Pt ambulated back to room. O2 sat was 92-95% on RA with all activity. Pt denied SOB but did have cough. All questions and concerns were addressed. Pt was left in bed with all needs met at conclusion of session. Treatment:  Patient practiced and was instructed in the following treatment:    Therapeutic activities:  Ambulation: Pt ambulated extended distance x2 reps without AD. Pt was cued for safety. Stairs: Pt was cued for safety on stairs. Vitals and symptoms were closely monitored throughout session. PLAN:    Patient is making Good progress towards established goals. Will continue with current POC.       Time in: 1030  Time out: 1055    Total Treatment Time 25 minutes     CPT codes:  [] Gait training 71027 0 minutes  [] Manual therapy 74210 0 minutes  [x] Therapeutic activities 10003 25 minutes  [] Therapeutic exercises 08649 0 minutes  [] Neuromuscular reeducation 50835 0 minutes      Nara Smyth PT, DPT   [id number]
spironolactone  25 mg Oral Daily    potassium chloride  20 mEq Oral BID WC    sodium chloride flush  5-40 mL IntraVENous 2 times per day    enoxaparin  40 mg SubCUTAneous Daily       Physical Exam:  General Appearance: appears comfortable in no acute distress. HEENT: Normocephalic atraumatic without obvious abnormality   Neck: Lips, mucosa, and tongue normal.  Supple, symmetrical, trachea midline, no adenopathy;thyroid:  no enlargement/tenderness/nodules or JVD. Lung: Breath sounds CTA. Respirations   unlabored. Symmetrical expansion. Heart: RRR, normal S1, S2. No MRG  Abdomen: Soft, NT, ND. BS present x 4 quadrants. No bruit or organomegaly. Extremities: Pedal pulses 2+ symmetric b/l. Extremities normal, no cyanosis, clubbing, or edema. Musculokeletal: No joint swelling, no muscle tenderness. ROM normal in all joints of extremities. Neurologic: Mental status: Alert and Oriented X3 . Pertinent/ New Labs and Imaging Studies     Imaging Personally Reviewed:    Chest CT with Contrast 5/2/23:    IMPRESSION:  Moderate left pleural effusion with adjacent atelectasis. No suspicious  pulmonary nodule or mass however may be obscured due to volume of left  effusion. Visualized portions of the upper abdomen reveal at least moderate to large  volume abdominopelvic ascites partially imaged with heterogeneous appearance  of the liver concerning for underlying cirrhotic hepatic morphology.     Labs:  Lab Results   Component Value Date/Time    WBC 6.9 05/03/2023 04:19 AM    HGB 10.2 05/03/2023 04:19 AM    HCT 29.1 05/03/2023 04:19 AM    MCV 99.0 05/03/2023 04:19 AM    MCH 34.7 05/03/2023 04:19 AM    MCHC 35.1 05/03/2023 04:19 AM    RDW 14.7 05/03/2023 04:19 AM     05/03/2023 04:19 AM    MPV 9.8 05/03/2023 04:19 AM     Lab Results   Component Value Date/Time     05/03/2023 04:19 AM    K 3.3 05/03/2023 04:19 AM     05/03/2023 04:19 AM    CO2 31 05/03/2023 04:19 AM    BUN 10 05/03/2023 04:19 AM
LABALBU 2.1 05/04/2023 05:15 AM    CALCIUM 8.1 05/04/2023 05:15 AM    LABGLOM >60 05/04/2023 05:15 AM     Lab Results   Component Value Date/Time    PROTIME 20.7 05/04/2023 05:15 AM    INR 1.9 05/04/2023 05:15 AM     No results for input(s): PROBNP in the last 72 hours. No results for input(s): PROCAL in the last 72 hours. This SmartLink has not been configured with any valid records. Micro:  No results for input(s): CULTRESP in the last 72 hours. Recent Labs     05/02/23  1054   LABGRAM Refer to ordered Gram stain for results       No results for input(s): LEGUR in the last 72 hours. No results for input(s): STREPNEUMAGU in the last 72 hours. No results for input(s): LP1UAG in the last 72 hours. Assessment:    Moderate left pleural effusion  Current nicotine dependence  Large ascites-s/p paracentesis 9100 ml removed 5/2/23  Liver cirrhosis    Plan:   Continue to monitor on room air  Follow echocardiogram  CXR reviewed  IR for right thoracentesis today, 900cc removed  Tobacco cessation recommended    OK to discharge tomorrow per Pulmonology    This plan of care was reviewed in collaboration with Dr. Aren Mir    Electronically signed by HEIDE Wynn CNP on 5/4/2023 at 12:49 PM      Addendum:  Patient just returned from left-sided thoracentesis. Total of 900 cc fluid was removed. Fluid chemistry consistent with transudate. Patient currently is on room air. In no acute distress. We will follow final pathology. I personally saw, examined and provided care for the patient. Radiographs, labs and medication list were reviewed by me independently. I spoke with bedside nursing, therapists and consultants. The case was discussed in detail and plans for care were established. Review of CNP documentation was conducted and revisions were made as appropriate. I agree with the above documented exam, problem list and plan of care.    Andry Vázquez MD   He is stable to be discharged
+++++++++++++++++++++++++++++++++++++++++++++++++  Madi Cleveland MD   [facility].  +++++++++++++++++++++++++++++++++++++++++++++++++  NOTE: This report was transcribed using voice recognition software. Every effort was made to ensure accuracy; however, inadvertent computerized transcription errors may be present.

## 2023-05-05 NOTE — PATIENT CARE CONFERENCE
P Quality Flow/Interdisciplinary Rounds Progress Note        Quality Flow Rounds held on May 5, 2023    Disciplines Attending:  Bedside Nurse, , , and Nursing Unit Leadership    Jeff Castillo was admitted on 5/1/2023  1:29 PM    Anticipated Discharge Date:       Disposition:    Brian Score:  Brian Scale Score: 20    Readmission Risk              Risk of Unplanned Readmission:  11           Discussed patient goal for the day, patient clinical progression, and barriers to discharge.   The following Goal(s) of the Day/Commitment(s) have been identified:  Activity Progression  increase and Labs - Report Results      Micki Darnell RN  May 5, 2023

## 2023-05-05 NOTE — CARE COORDINATION
5/3/23  Transition of Care Update. Patient admitted for Cirrhosis and leg swelling. Patient is pending an ECHO. Patient had a US of lower ext which was negative for DVT, Patient had a successful IR guided paracentesis for ascites. Patient continues to pend gastroenterology consult. Patient noted to have a lung nodule as well as left pleural effusion and being followed by Pulmonology. Patient was evaluated by therapy with AM-PAC score of 20/24. Discharge goal is to return home with no needs pending course of treatment. DVO/Erich to follow.     Electronically signed by SAMARIA Durham on 5/3/2023 at 9:11 AM
5/4/23  Transition of Care Update. Patient admitted for Cirrhosis and leg swelling. Patient is being followed by  Pulmonary and  IR with consult to gastroenterology pending. Patient is for a right thoracentesis today and potassium is being replaced. Patient is independent from home and discharge goal is home with no needs when medically stable.       Electronically signed by SAMARIA Ramírez on 5/4/2023 at 10:48 AM
5/5/23 Transition of Care Update. Patient admitted for Cirrhosis and leg swelling. Patient is being followed by  Pulmonary and  IR with consult to gastroenterology pending. Patient is s/p Right thoracentesis. Patient is on room air. Patient is independent from home and discharge goal is home with no needs when medically stable. Patient states one of this friends will provide transport home on discharge. Sw/Cm to follow.     Electronically signed by SAMARIA Alonzo on 5/5/2023 at 7:42 AM
provider on file. PCP verified by CM? No (no PCP but agreeable to having one set up.)    Chart Reviewed: Yes      History Provided by: Patient  Patient Orientation: Alert and Oriented, Person, Place, Situation    Patient Cognition: Alert    Hospitalization in the last 30 days (Readmission):  No    If yes, Readmission Assessment in  Navigator will be completed. Advance Directives:      Code Status: Full Code   Patient's Primary Decision Maker is:        Discharge Planning:    Patient lives with: Alone Type of Home: House  Primary Care Giver: Self  Patient Support Systems include: None   Current Financial resources:    Current community resources:    Current services prior to admission: None            Current DME:              Type of Home Care services:  None    ADLS  Prior functional level: Independent in ADLs/IADLs  Current functional level: Assistance with the following:, Mobility    PT AM-PAC: 20 /24  OT AM-PAC: /24    Family can provide assistance at DC: No  Would you like Case Management to discuss the discharge plan with any other family members/significant others, and if so, who?  No  Plans to Return to Present Housing: Yes  Other Identified Issues/Barriers to RETURNING to current housing: none noted at this time  Potential Assistance needed at discharge: N/A            Potential DME:    Patient expects to discharge to: [address] [place] [address] for transportation at discharge:      Financial    Payor: [employer] [address] / Plan: [address] [place] [address] PPO / Product Type: *No Product type* /     Does insurance require precert for SNF: Yes    Potential assistance Purchasing Medications:    Meds-to-Beds request: Yes    No Pharmacies Listed    Notes:    Factors facilitating achievement of predicted outcomes: Friend support, Motivated, Cooperative, and Pleasant    Barriers to discharge: Medical complications    Additional Case Management Notes: See above    The Plan for Transition of Care is related to the following treatment goals of Cirrhosis

## 2023-05-06 LAB
BACTERIA FLD AEROBE CULT: NORMAL
BACTERIA FLD AEROBE CULT: NORMAL
BACTERIA SPEC ANAEROBE CULT: NORMAL
GRAM STN SPEC: NORMAL
GRAM STN SPEC: NORMAL

## 2023-05-07 LAB — BACTERIA SPEC ANAEROBE CULT: NORMAL

## 2023-05-09 LAB — BACTERIA SPEC ANAEROBE CULT: NORMAL

## 2023-05-11 ENCOUNTER — OFFICE VISIT (OUTPATIENT)
Dept: FAMILY MEDICINE CLINIC | Age: 68
End: 2023-05-11
Payer: COMMERCIAL

## 2023-05-11 VITALS
WEIGHT: 172 LBS | HEART RATE: 87 BPM | BODY MASS INDEX: 24.08 KG/M2 | RESPIRATION RATE: 16 BRPM | OXYGEN SATURATION: 97 % | HEIGHT: 71 IN | DIASTOLIC BLOOD PRESSURE: 58 MMHG | TEMPERATURE: 98.3 F | SYSTOLIC BLOOD PRESSURE: 116 MMHG

## 2023-05-11 DIAGNOSIS — Z13.220 ENCOUNTER FOR SCREENING FOR LIPID DISORDER: ICD-10-CM

## 2023-05-11 DIAGNOSIS — Z85.46 HISTORY OF PROSTATE CANCER: ICD-10-CM

## 2023-05-11 DIAGNOSIS — Z91.89 ENCOUNTER FOR HEPATITIS C VIRUS SCREENING TEST FOR HIGH RISK PATIENT: ICD-10-CM

## 2023-05-11 DIAGNOSIS — Z11.4 ENCOUNTER FOR SCREENING FOR HIV: ICD-10-CM

## 2023-05-11 DIAGNOSIS — K70.31 ALCOHOLIC CIRRHOSIS OF LIVER WITH ASCITES (HCC): Primary | ICD-10-CM

## 2023-05-11 DIAGNOSIS — Z11.59 ENCOUNTER FOR HEPATITIS C VIRUS SCREENING TEST FOR HIGH RISK PATIENT: ICD-10-CM

## 2023-05-11 PROCEDURE — 1123F ACP DISCUSS/DSCN MKR DOCD: CPT

## 2023-05-11 PROCEDURE — 99213 OFFICE O/P EST LOW 20 MIN: CPT

## 2023-05-11 RX ORDER — SPIRONOLACTONE 50 MG/1
50 TABLET, FILM COATED ORAL 2 TIMES DAILY
Qty: 180 TABLET | Refills: 1 | Status: SHIPPED | OUTPATIENT
Start: 2023-05-11

## 2023-05-11 SDOH — ECONOMIC STABILITY: INCOME INSECURITY: HOW HARD IS IT FOR YOU TO PAY FOR THE VERY BASICS LIKE FOOD, HOUSING, MEDICAL CARE, AND HEATING?: NOT HARD AT ALL

## 2023-05-11 SDOH — ECONOMIC STABILITY: FOOD INSECURITY: WITHIN THE PAST 12 MONTHS, THE FOOD YOU BOUGHT JUST DIDN'T LAST AND YOU DIDN'T HAVE MONEY TO GET MORE.: NEVER TRUE

## 2023-05-11 SDOH — ECONOMIC STABILITY: HOUSING INSECURITY
IN THE LAST 12 MONTHS, WAS THERE A TIME WHEN YOU DID NOT HAVE A STEADY PLACE TO SLEEP OR SLEPT IN A SHELTER (INCLUDING NOW)?: NO

## 2023-05-11 SDOH — ECONOMIC STABILITY: FOOD INSECURITY: WITHIN THE PAST 12 MONTHS, YOU WORRIED THAT YOUR FOOD WOULD RUN OUT BEFORE YOU GOT MONEY TO BUY MORE.: NEVER TRUE

## 2023-05-11 ASSESSMENT — ENCOUNTER SYMPTOMS
NAUSEA: 0
COLOR CHANGE: 1
PHOTOPHOBIA: 0
ABDOMINAL DISTENTION: 1
SHORTNESS OF BREATH: 0
WHEEZING: 0
ABDOMINAL PAIN: 0
FACIAL SWELLING: 0
VOMITING: 0
CHEST TIGHTNESS: 0

## 2023-05-11 ASSESSMENT — PATIENT HEALTH QUESTIONNAIRE - PHQ9
2. FEELING DOWN, DEPRESSED OR HOPELESS: 0
SUM OF ALL RESPONSES TO PHQ QUESTIONS 1-9: 0
1. LITTLE INTEREST OR PLEASURE IN DOING THINGS: 0
SUM OF ALL RESPONSES TO PHQ QUESTIONS 1-9: 0
SUM OF ALL RESPONSES TO PHQ9 QUESTIONS 1 & 2: 0

## 2023-05-11 NOTE — PROGRESS NOTES
Patient is a 62-year-old male with a past medical history of alcoholism, prostate cancer status post prostatectomy in  who presents today to establish care and for hospital follow-up. The patient was recently admitted to the hospital with peripheral edema and worsening abdominal swelling with accompanying shortness of breath. Work-up at that time revealed liver cirrhosis with abdominal ascites and pleural effusion. Patient had thoracentesis and paracentesis with relief of symptoms. The patient was discharged on spironolactone 25 mg daily. The patient reports that since his discharge from the hospital.  His abdomen and legs have not had worsening swelling. However, he states that he has not had a large response to the spironolactone, only urinating twice daily. He states that while the swelling in his abdomen and legs is not increased, there is still some moderate swelling that has not yet been relieved. He denies chest pain, shortness of breath, abdominal pain, nausea/vomiting, myalgias/arthralgias, lightheadedness, and dizziness. He does endorse mild fatigue. The patient has a history of alcoholism. He states that he would previously drink 3 beers a day, then several years ago when his wife , he began drinking 4-6 shots of whiskey daily. He states that at the start of the year he quit drinking altogether. At that time he reports mild tremors but denies further withdrawal symptoms. Additional social history, the patient is smoked half pack of cigarettes per day for approximately 50 years. The patient has not seen a primary care provider in approximately 6 years. Past medical history only notable for alcoholism and prostate cancer. Past surgical history only includes radical prostatectomy in 2006. Family history notable for diabetes mellitus in mother. Social history notable only as noted above.     Blood pressure (!) 116/58, pulse 87, temperature 98.3 °F (36.8 °C), temperature source

## 2023-05-11 NOTE — PROGRESS NOTES
[facility] RESIDENCY PROGRAM  DATE OF VISIT : 2023    Patient : Cherrie Johnson   Age : 74 y.o.  : 1955   MRN : [id number]   ______________________________________________________________________    Chief Complaint:   Chief Complaint   Patient presents with    New Patient       HPI:   Cherrie Johnson is a 74 y.o. male with hx alcoholism, prostate cancer s/p prostatectomy in  who presents today to establish care and for hospital follow-up. The patient was recently admitted to the hospital with peripheral edema and abdominal swelling. Patient had thoracentesis and paracentesis at that time with relief of his symptoms. He was started on spironolactone 25 mg at discharge. The patient states that since starting the spironolactone, his daily urine output has not increased. He is only urinating twice daily. He states that his legs and abdomen are still mildly swollen but have not worsened since he left the hospital. Reports he has also been feeling mildly fatigued since he first presented to the hospital, and he feels weaker overall. The patient has a history of alcoholism. He states that he would previously drink 3 beers daily, then once his wife passed away several years ago, he began drinking 4-6 shots of whiskey daily. He states that at the start of the year, he quit drinking. He had a tremor associated with stopping the drinking, but never any further withdrawal symptoms.        Past Medical History:  Past Medical History:   Diagnosis Date    Alcoholism ([facility]     Prostate cancer [facility])        Past Surgical History:  Past Surgical History:   Procedure Laterality Date    PROSTATECTOMY           Family History:  Family History   Problem Relation Age of Onset    Diabetes Mother        Social History:  Social History     Socioeconomic History    Marital status: Single     Spouse name: None    Number of children: None    Years of education: None    Highest

## 2023-08-25 ENCOUNTER — OFFICE VISIT (OUTPATIENT)
Dept: FAMILY MEDICINE CLINIC | Age: 68
End: 2023-08-25
Payer: COMMERCIAL

## 2023-08-25 VITALS
RESPIRATION RATE: 17 BRPM | OXYGEN SATURATION: 98 % | DIASTOLIC BLOOD PRESSURE: 59 MMHG | WEIGHT: 158 LBS | HEART RATE: 73 BPM | BODY MASS INDEX: 22.12 KG/M2 | TEMPERATURE: 99 F | HEIGHT: 71 IN | SYSTOLIC BLOOD PRESSURE: 120 MMHG

## 2023-08-25 DIAGNOSIS — Z11.4 ENCOUNTER FOR SCREENING FOR HIV: ICD-10-CM

## 2023-08-25 DIAGNOSIS — C61 PROSTATE CANCER (HCC): ICD-10-CM

## 2023-08-25 DIAGNOSIS — Z85.46 HISTORY OF PROSTATE CANCER: ICD-10-CM

## 2023-08-25 DIAGNOSIS — Z13.220 ENCOUNTER FOR SCREENING FOR LIPID DISORDER: ICD-10-CM

## 2023-08-25 DIAGNOSIS — Z11.59 ENCOUNTER FOR HEPATITIS C VIRUS SCREENING TEST FOR HIGH RISK PATIENT: ICD-10-CM

## 2023-08-25 DIAGNOSIS — Z91.89 ENCOUNTER FOR HEPATITIS C VIRUS SCREENING TEST FOR HIGH RISK PATIENT: ICD-10-CM

## 2023-08-25 DIAGNOSIS — R62.7 POOR WEIGHT GAIN IN ADULT: Primary | ICD-10-CM

## 2023-08-25 DIAGNOSIS — K70.31 ALCOHOLIC CIRRHOSIS OF LIVER WITH ASCITES (HCC): ICD-10-CM

## 2023-08-25 DIAGNOSIS — Z12.11 COLON CANCER SCREENING: ICD-10-CM

## 2023-08-25 DIAGNOSIS — Z87.891 PERSONAL HISTORY OF TOBACCO USE: ICD-10-CM

## 2023-08-25 LAB
ALBUMIN SERPL-MCNC: 3 G/DL (ref 3.5–5.2)
ALP BLD-CCNC: 164 U/L (ref 40–129)
ALT SERPL-CCNC: 13 U/L (ref 0–40)
ANION GAP SERPL CALCULATED.3IONS-SCNC: 7 MMOL/L (ref 7–16)
AST SERPL-CCNC: 85 U/L (ref 0–39)
BILIRUB SERPL-MCNC: 1.8 MG/DL (ref 0–1.2)
BUN BLDV-MCNC: 10 MG/DL (ref 6–23)
CALCIUM SERPL-MCNC: 8.9 MG/DL (ref 8.6–10.2)
CHLORIDE BLD-SCNC: 102 MMOL/L (ref 98–107)
CO2: 26 MMOL/L (ref 22–29)
CREAT SERPL-MCNC: 0.6 MG/DL (ref 0.7–1.2)
GFR SERPL CREATININE-BSD FRML MDRD: >60 ML/MIN/1.73M2
GLUCOSE BLD-MCNC: 91 MG/DL (ref 74–99)
POTASSIUM SERPL-SCNC: 3.9 MMOL/L (ref 3.5–5)
SODIUM BLD-SCNC: 135 MMOL/L (ref 132–146)
TOTAL PROTEIN: 8.1 G/DL (ref 6.4–8.3)

## 2023-08-25 PROCEDURE — 99213 OFFICE O/P EST LOW 20 MIN: CPT

## 2023-08-25 PROCEDURE — 1123F ACP DISCUSS/DSCN MKR DOCD: CPT

## 2023-08-26 LAB
CHOLESTEROL: 173 MG/DL
HDLC SERPL-MCNC: 71 MG/DL
LDL CHOLESTEROL: 83 MG/DL
PROSTATE SPECIFIC ANTIGEN: <0.01 NG/ML (ref 0–4)
TRIGL SERPL-MCNC: 93 MG/DL
VLDLC SERPL CALC-MCNC: 19 MG/DL

## 2023-08-28 ENCOUNTER — TELEPHONE (OUTPATIENT)
Dept: FAMILY MEDICINE CLINIC | Age: 68
End: 2023-08-28

## 2023-08-28 LAB
HEPATITIS C ANTIBODY: NONREACTIVE
HIV AG/AB: NONREACTIVE

## 2023-09-06 ENCOUNTER — INITIAL CONSULT (OUTPATIENT)
Dept: GASTROENTEROLOGY | Age: 68
End: 2023-09-06
Payer: COMMERCIAL

## 2023-09-06 ENCOUNTER — TELEPHONE (OUTPATIENT)
Dept: GASTROENTEROLOGY | Age: 68
End: 2023-09-06

## 2023-09-06 ENCOUNTER — PREP FOR PROCEDURE (OUTPATIENT)
Dept: GASTROENTEROLOGY | Age: 68
End: 2023-09-06

## 2023-09-06 VITALS
TEMPERATURE: 97 F | SYSTOLIC BLOOD PRESSURE: 110 MMHG | HEART RATE: 48 BPM | BODY MASS INDEX: 22.46 KG/M2 | OXYGEN SATURATION: 100 % | WEIGHT: 160.4 LBS | HEIGHT: 71 IN | RESPIRATION RATE: 16 BRPM | DIASTOLIC BLOOD PRESSURE: 68 MMHG

## 2023-09-06 DIAGNOSIS — Z12.11 COLON CANCER SCREENING: Primary | ICD-10-CM

## 2023-09-06 DIAGNOSIS — Z12.11 SPECIAL SCREENING FOR MALIGNANT NEOPLASMS, COLON: ICD-10-CM

## 2023-09-06 DIAGNOSIS — K70.31 ALCOHOLIC CIRRHOSIS OF LIVER WITH ASCITES (HCC): ICD-10-CM

## 2023-09-06 DIAGNOSIS — Z85.46 HISTORY OF PROSTATE CANCER: ICD-10-CM

## 2023-09-06 PROBLEM — I85.00 ESOPHAGEAL VARICES WITHOUT BLEEDING (HCC): Status: ACTIVE | Noted: 2023-09-06

## 2023-09-06 PROCEDURE — 1123F ACP DISCUSS/DSCN MKR DOCD: CPT | Performed by: STUDENT IN AN ORGANIZED HEALTH CARE EDUCATION/TRAINING PROGRAM

## 2023-09-06 PROCEDURE — 99204 OFFICE O/P NEW MOD 45 MIN: CPT | Performed by: STUDENT IN AN ORGANIZED HEALTH CARE EDUCATION/TRAINING PROGRAM

## 2023-09-06 RX ORDER — SOD SULF/POT CHLORIDE/MAG SULF 1.479 G
1 TABLET ORAL ONCE
Qty: 24 TABLET | Refills: 0 | Status: SHIPPED | OUTPATIENT
Start: 2023-09-06 | End: 2023-09-06

## 2023-09-06 RX ORDER — FUROSEMIDE 40 MG/1
40 TABLET ORAL 2 TIMES DAILY
Qty: 60 TABLET | Refills: 3 | Status: SHIPPED | OUTPATIENT
Start: 2023-09-06

## 2023-09-06 NOTE — TELEPHONE ENCOUNTER
Prior Authorization Form:      DEMOGRAPHICS:                     Patient Name:  Warden Marin  Patient :  1955            Insurance:  Payor: [employer] / Plan: [employer] - [place] PPO / Product Type: *No Product type* /   Insurance ID Number:    Payer/Plan Subscr  Sex Relation Sub. Ins. ID Effective Group Num   1.  [address] 1955 Male Self [id number]                                    [id number]         DIAGNOSIS & PROCEDURE:                       Procedure/Operation: egd, colonoscopy           CPT Code: 88959, (66676 possible banding) 59417    Diagnosis:  esophageal varices, screening    ICD10 Code: I85.00, z12.11    Location:  [facility]    Surgeon:  Juliana Mcintosh INFORMATION:                          Date: 2024    Time: tbd              Anesthesia:  MAC/TIVA                                                       Status:  Outpatient        Special Comments:  na       Electronically signed by Alfonso Funk LPN on 179 at 5:99 PM

## 2023-09-06 NOTE — PROGRESS NOTES
[facility] ([place] campus)   Gastroenterology, Hepatology, &  Advanced Endoscopy    Consult       ASSESSMENT AND PLAN:    68y/M w/ EtOH cirrhosis presents to clinic to establish care. MELD: 19    PLAN:  Cirrhosis:  - Serologic testing   - CBC, CMP, and INR today  - If MELD remains elevated, may need transplant evaluation.   - US in Nov for [address] screening     2. Ascites:  - Will d/c spironolactone as he has gynecomastia likely 2/2 to medication though possible due to cirrhosis itself. - Will transition to Lasix BID. Monitor BMP in 2 weeks. - Will begin scheduling for IR guided paracentesis. 3. Variceal Screening:  - Will schedule for EGD. 4. Colon Cancer Screening:  - Will attempt to schedule Colonoscopy at time of EGD if okay with Dr. Angelina Hathaway. I will see the patient back in clinic in 4-6 weeks. Thank you for including us in the care of this patient. Please do not hesitate to contact us with any additional questions or concerns. Mariana Akers MD  Gastroenterology/Hepatology  Advanced Endoscopy      HISTORY OF PRESENT ILLNESS:      Mr. Reeta Litten is a 68y/M w/ history of prostate cancer s/p prostatectomy in  and recent hospitalization for EtOH cirrhosis c/b pleural effusions and ascites w/ thoracentesis performed and paracentesis with removal of 9L. He was d/c on Lasix and Spironolactone. He had a heavy and regular drinking history starting in  when his wife . He stopped in 2023. His most recent labs show Na 135, Cr 0.6, Tbili 1.8, INR 1.9 for a MELD of 19. The patient reports that last year his lab results revealed markedly elevated PSA and he sought care at [facility] and was told that his prostate cancer is likely recurring. He was given radiation therapy in Oct 2022 and by 2023 he was told that the cancer was remission. He stopped the treatment but noticed that he started feeling fatigue and losing weight.      Cirrhosis:  Etiology: EtOH  MELD: 19  HE: No history  EV:
wheezing  Abdomen: Distended, Non-tender    Extremities: Moves all extremities, no effusions or bruising.   Skin: No rashes or jaundice, Bilateral breast lumps   Neuro: A&O x 3, CN grossly intact, non-focal exam       ASSESSMENT AND PLAN      - Acholic liver cirrhosis     - US of the abdomen. high suspension of ascites   - US electrography  - liver panel labs   - CMP and CBC q3 months   - US q3 months  - INR and PT  q3 months    - Continue spirolactone 50 mg BID   - Continue alcohol abstinence    -Shortness of Breath    -Follow up with PCP  -Bilateral breast lumps    - Follow with PCP

## 2023-09-14 ENCOUNTER — HOSPITAL ENCOUNTER (OUTPATIENT)
Dept: ULTRASOUND IMAGING | Age: 68
Discharge: HOME | End: 2023-09-16
Attending: STUDENT IN AN ORGANIZED HEALTH CARE EDUCATION/TRAINING PROGRAM
Payer: COMMERCIAL

## 2023-09-14 ENCOUNTER — HOSPITAL ENCOUNTER (OUTPATIENT)
Age: 68
Discharge: HOME | End: 2023-09-14
Attending: STUDENT IN AN ORGANIZED HEALTH CARE EDUCATION/TRAINING PROGRAM
Payer: COMMERCIAL

## 2023-09-14 DIAGNOSIS — K74.69 OTHER CIRRHOSIS OF LIVER (HCC): ICD-10-CM

## 2023-09-14 DIAGNOSIS — R18.8 OTHER ASCITES: ICD-10-CM

## 2023-09-14 LAB
ALBUMIN SERPL-MCNC: 3.3 G/DL (ref 3.5–5.2)
ALP SERPL-CCNC: 183 U/L (ref 40–129)
ALT SERPL-CCNC: 16 U/L (ref 0–40)
ANION GAP SERPL CALCULATED.3IONS-SCNC: 13 MMOL/L (ref 7–16)
AST SERPL-CCNC: 101 U/L (ref 0–39)
BASOPHILS # BLD: 0.06 K/UL (ref 0–0.2)
BASOPHILS NFR BLD: 1 % (ref 0–2)
BILIRUB SERPL-MCNC: 3.9 MG/DL (ref 0–1.2)
BUN SERPL-MCNC: 16 MG/DL (ref 6–23)
CALCIUM SERPL-MCNC: 9.3 MG/DL (ref 8.6–10.2)
CHLORIDE SERPL-SCNC: 94 MMOL/L (ref 98–107)
CO2 SERPL-SCNC: 29 MMOL/L (ref 22–29)
CREAT SERPL-MCNC: 0.6 MG/DL (ref 0.7–1.2)
EOSINOPHIL # BLD: 0.39 K/UL (ref 0.05–0.5)
EOSINOPHILS RELATIVE PERCENT: 6 % (ref 0–6)
ERYTHROCYTE [DISTWIDTH] IN BLOOD BY AUTOMATED COUNT: 14 % (ref 11.5–15)
GFR SERPL CREATININE-BSD FRML MDRD: >60 ML/MIN/1.73M2
GLUCOSE SERPL-MCNC: 103 MG/DL (ref 74–99)
HBV SURFACE AB SERPL IA-ACNC: <3.1 MIU/ML (ref 0–9.99)
HBV SURFACE AG SERPL QL IA: NONREACTIVE
HCT VFR BLD AUTO: 40.3 % (ref 37–54)
HCV AB SERPL QL IA: NONREACTIVE
HGB BLD-MCNC: 13.5 G/DL (ref 12.5–16.5)
IMM GRANULOCYTES # BLD AUTO: <0.03 K/UL (ref 0–0.58)
IMM GRANULOCYTES NFR BLD: 0 % (ref 0–5)
INR PPP: 1.4
LYMPHOCYTES NFR BLD: 1.08 K/UL (ref 1.5–4)
LYMPHOCYTES RELATIVE PERCENT: 16 % (ref 20–42)
MCH RBC QN AUTO: 33.8 PG (ref 26–35)
MCHC RBC AUTO-ENTMCNC: 33.5 G/DL (ref 32–34.5)
MCV RBC AUTO: 101 FL (ref 80–99.9)
MONOCYTES NFR BLD: 0.71 K/UL (ref 0.1–0.95)
MONOCYTES NFR BLD: 11 % (ref 2–12)
NEUTROPHILS NFR BLD: 67 % (ref 43–80)
NEUTS SEG NFR BLD: 4.52 K/UL (ref 1.8–7.3)
PLATELET # BLD AUTO: 103 K/UL (ref 130–450)
PMV BLD AUTO: 11.8 FL (ref 7–12)
POTASSIUM SERPL-SCNC: 3 MMOL/L (ref 3.5–5)
PROT SERPL-MCNC: 9.2 G/DL (ref 6.4–8.3)
PROTHROMBIN TIME: 15.6 SEC (ref 9.3–12.4)
RBC # BLD AUTO: 3.99 M/UL (ref 3.8–5.8)
SODIUM SERPL-SCNC: 136 MMOL/L (ref 132–146)
WBC OTHER # BLD: 6.8 K/UL (ref 4.5–11.5)

## 2023-09-14 PROCEDURE — 87340 HEPATITIS B SURFACE AG IA: CPT

## 2023-09-14 PROCEDURE — 36415 COLL VENOUS BLD VENIPUNCTURE: CPT

## 2023-09-14 PROCEDURE — 86704 HEP B CORE ANTIBODY TOTAL: CPT

## 2023-09-14 PROCEDURE — 86803 HEPATITIS C AB TEST: CPT

## 2023-09-14 PROCEDURE — 86708 HEPATITIS A ANTIBODY: CPT

## 2023-09-14 PROCEDURE — 85025 COMPLETE CBC W/AUTO DIFF WBC: CPT

## 2023-09-14 PROCEDURE — 83540 ASSAY OF IRON: CPT

## 2023-09-14 PROCEDURE — 93976 VASCULAR STUDY: CPT

## 2023-09-14 PROCEDURE — 76705 ECHO EXAM OF ABDOMEN: CPT

## 2023-09-14 PROCEDURE — 83550 IRON BINDING TEST: CPT

## 2023-09-14 PROCEDURE — 86317 IMMUNOASSAY INFECTIOUS AGENT: CPT

## 2023-09-14 PROCEDURE — 82390 ASSAY OF CERULOPLASMIN: CPT

## 2023-09-14 PROCEDURE — 85610 PROTHROMBIN TIME: CPT

## 2023-09-14 PROCEDURE — 86255 FLUORESCENT ANTIBODY SCREEN: CPT

## 2023-09-14 PROCEDURE — 80053 COMPREHEN METABOLIC PANEL: CPT

## 2023-09-15 LAB
IRON SATN MFR SERPL: ABNORMAL % (ref 20–55)
IRON SERPL-MCNC: 237 UG/DL (ref 59–158)
MITOCHONDRIA AB SER QL: NEGATIVE
SMA IGG SER-ACNC: NEGATIVE
TIBC SERPL-MCNC: ABNORMAL UG/DL (ref 250–450)

## 2023-09-16 LAB
CERULOPLASMIN SERPL-MCNC: 28 MG/DL (ref 15–30)
HAV AB SERPL IA-ACNC: NONREACTIVE
HBV CORE AB SER QL: NONREACTIVE

## 2023-09-20 DIAGNOSIS — K74.69 OTHER CIRRHOSIS OF LIVER (HCC): Primary | ICD-10-CM

## 2023-09-22 ENCOUNTER — HOSPITAL ENCOUNTER (OUTPATIENT)
Dept: INTERVENTIONAL RADIOLOGY/VASCULAR | Age: 68
Discharge: HOME | End: 2023-09-22
Payer: COMMERCIAL

## 2023-09-22 VITALS
OXYGEN SATURATION: 98 % | SYSTOLIC BLOOD PRESSURE: 118 MMHG | TEMPERATURE: 98.9 F | HEART RATE: 61 BPM | RESPIRATION RATE: 18 BRPM | DIASTOLIC BLOOD PRESSURE: 51 MMHG

## 2023-09-22 DIAGNOSIS — R18.8 OTHER ASCITES: ICD-10-CM

## 2023-09-22 LAB
ALBUMIN FLD-MCNC: 1.2 G/DL
APPEARANCE FLD: CLEAR
BODY FLD TYPE: NORMAL
CLOT CHECK: NORMAL
COLOR FLD: YELLOW
GLUCOSE FLD-MCNC: 108 MG/DL
MONOCYTES NFR FLD: 89 %
NEUTROPHILS NFR FLD: 11 %
PROT FLD-MCNC: 2.7 G/DL
RBC # FLD: <2000 CELLS/UL
SPECIMEN TYPE: NORMAL
WBC # FLD: 168 CELLS/UL

## 2023-09-22 PROCEDURE — 84157 ASSAY OF PROTEIN OTHER: CPT

## 2023-09-22 PROCEDURE — 83615 LACTATE (LD) (LDH) ENZYME: CPT

## 2023-09-22 PROCEDURE — 49083 ABD PARACENTESIS W/IMAGING: CPT

## 2023-09-22 PROCEDURE — 88305 TISSUE EXAM BY PATHOLOGIST: CPT

## 2023-09-22 PROCEDURE — 89051 BODY FLUID CELL COUNT: CPT

## 2023-09-22 PROCEDURE — 88112 CYTOPATH CELL ENHANCE TECH: CPT

## 2023-09-22 PROCEDURE — 82945 GLUCOSE OTHER FLUID: CPT

## 2023-09-22 PROCEDURE — 87070 CULTURE OTHR SPECIMN AEROBIC: CPT

## 2023-09-22 PROCEDURE — 87205 SMEAR GRAM STAIN: CPT

## 2023-09-22 PROCEDURE — 82042 OTHER SOURCE ALBUMIN QUAN EA: CPT

## 2023-09-22 PROCEDURE — C1729 CATH, DRAINAGE: HCPCS

## 2023-09-22 NOTE — BRIEF OP NOTE
Brief-Op Note  ______________________________________________________________      IR PARACENTESIS    Patient Name: Gisselle Saleh   YOB: 1955  Medical Record Number: [id number]  Date of Procedure: 9/22/23  Room/Bed: Room/bed info not found      Pre-operative Diagnosis: Ascites    Post-operative Diagnosis: Ascites    Consent: Informed consent was obtained from the patient prior to the procedure. The details of the procedure, as well is its risks, benefits, and alternatives, were explained. Anesthesia: Local anesthesia.     Performed by: JUAN ALBERTO Garcia under on-site supervision by Priya Coleman MD.    Estimated blood loss: Minimal    Complications: None    Implants: None    (See radiology dictation in [facility] for image review and additional procedural information)    Electronically signed by JUAN ALBERTO Garcia   DD: 9/22/23  9:41 AM

## 2023-09-22 NOTE — INTERVAL H&P NOTE
IR H&P UPDATE NOTE    Patient's History and Physical Examination were reviewed from current hospital admission records. Silvana Blackmon appears likely to able to tolerate the requested Paracentesis procedure by the consultant. Risk and benefits were discussed with patient including ultimate complications, possibly death and consent was obtained.     Jenna Dasilva MD.

## 2023-09-22 NOTE — OP NOTE
Operative Note  ______________________________________________________________      IR PARACENTESIS    Patient Name: uDstin Shelton   YOB: 1955  Medical Record Number: [id number]  Date of Procedure: 9/22/23  Room/Bed: Room/bed info not found    Pre-operative Diagnosis: Ascites    Post-operative Diagnosis: Ascites    Consent: Informed consent was obtained from the patient prior to the procedure. The details of the procedure, as well is its risks, benefits, and alternatives, were explained. Anesthesia: Local anesthesia    Performed by: JUAN ALBERTO Damon under on-site supervision by Demario Whitehead MD.    Estimated blood loss: Minimal    Complications: None    Implants: None    Procedure: Routine scanning of all four abdominal quadrants was performed using real-time ultrasound and revealed sufficient amount of ascites fluid present. Decision was made to proceed with procedure. After obtaining consent, the patient was placed in the supine position with the head of the bed slightly elevated and the appropriate landmarks were identified. The skin over the puncture site in the right upper quadrant region was prepped with betadine and draped in a sterile fashion. Local anesthesia was obtained by infiltration using 2% Lidocaine without epinephrine. A 6 Belizean safe-t-centesis catheter was then advanced into the abdominal cavity. Fluid return was dark yellow colored. A total volume of  6200mL was withdrawn. The catheter was then withdrawn and a sterile dressing was placed over the site. The patient tolerated the procedure well. Complications: None. Estimated Blood Loss: < 10 cc. Albumin not given.        Electronically signed by JUAN ALBERTO Damon   DD: 9/22/23  9:41 AM

## 2023-09-24 LAB
LDH FLD L TO P-CCNC: 65 U/L
MICROORGANISM SPEC CULT: ABNORMAL
MICROORGANISM/AGENT SPEC: ABNORMAL
SPECIMEN DESCRIPTION: ABNORMAL
SPECIMEN TYPE: NORMAL

## 2023-09-25 LAB
MICROORGANISM SPEC CULT: NO GROWTH
MICROORGANISM/AGENT SPEC: ABNORMAL
NON-GYN CYTOLOGY REPORT: NORMAL
SPECIMEN DESCRIPTION: ABNORMAL

## 2023-09-27 LAB
SEND OUT REPORT: NORMAL
TEST NAME: NORMAL

## 2023-10-06 ENCOUNTER — HOSPITAL ENCOUNTER (OUTPATIENT)
Dept: INTERVENTIONAL RADIOLOGY/VASCULAR | Age: 68
Discharge: HOME | End: 2023-10-06
Payer: COMMERCIAL

## 2023-10-06 VITALS
RESPIRATION RATE: 20 BRPM | HEART RATE: 80 BPM | SYSTOLIC BLOOD PRESSURE: 125 MMHG | DIASTOLIC BLOOD PRESSURE: 57 MMHG | OXYGEN SATURATION: 97 % | TEMPERATURE: 97.9 F

## 2023-10-06 DIAGNOSIS — R18.8 OTHER ASCITES: ICD-10-CM

## 2023-10-06 PROBLEM — Z12.11 SPECIAL SCREENING FOR MALIGNANT NEOPLASMS, COLON: Status: RESOLVED | Noted: 2023-09-06 | Resolved: 2023-10-06

## 2023-10-06 LAB
APPEARANCE FLD: CLEAR
BASOPHILS # BLD: 0.06 K/UL (ref 0–0.2)
BASOPHILS NFR BLD: 1 % (ref 0–2)
BODY FLD TYPE: NORMAL
CLOT CHECK: NORMAL
COLOR FLD: YELLOW
EOSINOPHIL # BLD: 0.1 K/UL (ref 0.05–0.5)
EOSINOPHILS RELATIVE PERCENT: 2 % (ref 0–6)
ERYTHROCYTE [DISTWIDTH] IN BLOOD BY AUTOMATED COUNT: 13.2 % (ref 11.5–15)
HCT VFR BLD AUTO: 36.3 % (ref 37–54)
HGB BLD-MCNC: 12.6 G/DL (ref 12.5–16.5)
IMM GRANULOCYTES # BLD AUTO: <0.03 K/UL (ref 0–0.58)
IMM GRANULOCYTES NFR BLD: 0 % (ref 0–5)
INR PPP: 1.3
LYMPHOCYTES NFR BLD: 0.9 K/UL (ref 1.5–4)
LYMPHOCYTES RELATIVE PERCENT: 18 % (ref 20–42)
MCH RBC QN AUTO: 33.8 PG (ref 26–35)
MCHC RBC AUTO-ENTMCNC: 34.7 G/DL (ref 32–34.5)
MCV RBC AUTO: 97.3 FL (ref 80–99.9)
MONOCYTES NFR BLD: 0.56 K/UL (ref 0.1–0.95)
MONOCYTES NFR BLD: 11 % (ref 2–12)
MONOCYTES NFR FLD: 89 %
NEUTROPHILS NFR BLD: 68 % (ref 43–80)
NEUTROPHILS NFR FLD: 11 %
NEUTS SEG NFR BLD: 3.39 K/UL (ref 1.8–7.3)
PLATELET # BLD AUTO: 72 K/UL (ref 130–450)
PMV BLD AUTO: 10.3 FL (ref 7–12)
PROTHROMBIN TIME: 14.5 SEC (ref 9.3–12.4)
RBC # BLD AUTO: 3.73 M/UL (ref 3.8–5.8)
RBC # FLD: <2000 CELLS/UL
WBC # FLD: 71 CELLS/UL
WBC OTHER # BLD: 5 K/UL (ref 4.5–11.5)

## 2023-10-06 PROCEDURE — 49083 ABD PARACENTESIS W/IMAGING: CPT

## 2023-10-06 PROCEDURE — 89051 BODY FLUID CELL COUNT: CPT

## 2023-10-06 PROCEDURE — C1729 CATH, DRAINAGE: HCPCS

## 2023-10-06 PROCEDURE — P9047 ALBUMIN (HUMAN), 25%, 50ML: HCPCS | Performed by: PHYSICIAN ASSISTANT

## 2023-10-06 PROCEDURE — 2500000003 HC RX 250 WO HCPCS: Performed by: PHYSICIAN ASSISTANT

## 2023-10-06 PROCEDURE — 87205 SMEAR GRAM STAIN: CPT

## 2023-10-06 PROCEDURE — 85610 PROTHROMBIN TIME: CPT

## 2023-10-06 PROCEDURE — 87070 CULTURE OTHR SPECIMN AEROBIC: CPT

## 2023-10-06 PROCEDURE — 6360000002 HC RX W HCPCS: Performed by: PHYSICIAN ASSISTANT

## 2023-10-06 PROCEDURE — 85025 COMPLETE CBC W/AUTO DIFF WBC: CPT

## 2023-10-06 RX ORDER — LIDOCAINE HYDROCHLORIDE 10 MG/ML
INJECTION, SOLUTION EPIDURAL; INFILTRATION; INTRACAUDAL; PERINEURAL PRN
Status: COMPLETED | OUTPATIENT
Start: 2023-10-06 | End: 2023-10-06

## 2023-10-06 RX ORDER — ALBUMIN (HUMAN) 12.5 G/50ML
25 SOLUTION INTRAVENOUS ONCE
Status: COMPLETED | OUTPATIENT
Start: 2023-10-06 | End: 2023-10-06

## 2023-10-06 RX ADMIN — LIDOCAINE HYDROCHLORIDE 10 ML: 10 INJECTION, SOLUTION EPIDURAL; INFILTRATION; INTRACAUDAL; PERINEURAL at 08:34

## 2023-10-06 RX ADMIN — ALBUMIN (HUMAN) 25 G: 0.25 INJECTION, SOLUTION INTRAVENOUS at 08:48

## 2023-10-06 NOTE — BRIEF OP NOTE
Brief-Op Note  ______________________________________________________________      IR PARACENTESIS    Patient Name: Abel Mccain   YOB: 1955  Medical Record Number: [id number]  Date of Procedure: 10/6/23  Room/Bed: Room/bed info not found      Pre-operative Diagnosis: Ascites    Post-operative Diagnosis: Ascites    Consent: Informed consent was obtained from the patient prior to the procedure. The details of the procedure, as well is its risks, benefits, and alternatives, were explained. Anesthesia: Local anesthesia.     Performed by: JUAN ALBERTO Wood under on-site supervision by Joyce Briscoe MD.    Estimated blood loss: Minimal    Complications: None    Implants: None    (See radiology dictation in [facility] for image review and additional procedural information)    Electronically signed by JUAN ALBERTO Wood   DD: 10/6/23  9:21 AM

## 2023-10-06 NOTE — OP NOTE
Operative Note  ______________________________________________________________      IR PARACENTESIS    Patient Name: Bambi Hamilton   YOB: 1955  Medical Record Number: [id number]  Date of Procedure: 10/6/23  Room/Bed: Room/bed info not found    Pre-operative Diagnosis: Ascites    Post-operative Diagnosis: Ascites    Consent: Informed consent was obtained from the patient prior to the procedure. The details of the procedure, as well is its risks, benefits, and alternatives, were explained. Anesthesia: Local anesthesia    Performed by: JUAN ALBERTO Blanton under on-site supervision by Patricia Salas MD.    Estimated blood loss: Minimal    Complications: None    Implants: None    Procedure: Routine scanning of all four abdominal quadrants was performed using real-time ultrasound and revealed sufficient amount of ascites fluid present. Decision was made to proceed with procedure. After obtaining consent, the patient was placed in the supine position with the head of the bed slightly elevated and the appropriate landmarks were identified. The skin over the puncture site in the right lower quadrant region was prepped with betadine and draped in a sterile fashion. Local anesthesia was obtained by infiltration using 1% Lidocaine without epinephrine. A 6 Tristanian safe-t-centesis catheter was then advanced into the abdominal cavity. Fluid return was clear yellow colored. A total volume of  4850mL was withdrawn. The catheter was then withdrawn and a sterile dressing was placed over the site. The patient tolerated the procedure well. Complications: None. Estimated Blood Loss: < 10 cc. Albumin 25g IV given.        Electronically signed by JUAN ALBERTO lBanton   DD: 10/6/23  9:21 AM

## 2023-10-06 NOTE — INTERVAL H&P NOTE
Agree with plan, will await his home BP readings   IR H&P UPDATE NOTE    Patient's History and Physical Examination were reviewed from current hospital admission records. Bart Goldberg appears likely to able to tolerate the requested Paracentesis procedure by the consultant. Risk and benefits were discussed with patient including ultimate complications, possibly death and consent was obtained.     Jenna Hare MD.

## 2023-10-08 LAB
MICROORGANISM SPEC CULT: NORMAL
MICROORGANISM/AGENT SPEC: NORMAL
SPECIMEN DESCRIPTION: NORMAL

## 2023-10-09 LAB
MICROORGANISM SPEC CULT: NO GROWTH
MICROORGANISM/AGENT SPEC: NORMAL
SPECIMEN DESCRIPTION: NORMAL

## 2023-10-19 PROBLEM — Z85.46 HISTORY OF PROSTATE CANCER: Status: ACTIVE | Noted: 2023-10-19

## 2023-10-19 PROBLEM — K70.31 ALCOHOLIC CIRRHOSIS OF LIVER WITH ASCITES (HCC): Status: ACTIVE | Noted: 2023-10-19

## 2023-10-20 ENCOUNTER — HOSPITAL ENCOUNTER (OUTPATIENT)
Dept: INTERVENTIONAL RADIOLOGY/VASCULAR | Age: 68
Discharge: HOME | End: 2023-10-20
Payer: COMMERCIAL

## 2023-10-20 VITALS
HEART RATE: 65 BPM | OXYGEN SATURATION: 95 % | DIASTOLIC BLOOD PRESSURE: 64 MMHG | SYSTOLIC BLOOD PRESSURE: 136 MMHG | RESPIRATION RATE: 18 BRPM

## 2023-10-20 DIAGNOSIS — K70.31 ALCOHOLIC CIRRHOSIS OF LIVER WITH ASCITES (HCC): ICD-10-CM

## 2023-10-20 LAB
APPEARANCE FLD: CLEAR
BODY FLD TYPE: ABNORMAL
CLOT CHECK: ABNORMAL
COLOR FLD: YELLOW
INR PPP: 1.5
MONOCYTES NFR FLD: 83 %
NEUTROPHILS NFR FLD: 17 %
PLATELET, FLUORESCENCE: 100 K/UL (ref 130–450)
PROTHROMBIN TIME: 16.4 SEC (ref 9.3–12.4)
RBC # FLD: 1000 CELLS/UL
WBC # FLD: 127 CELLS/UL

## 2023-10-20 PROCEDURE — 36591 DRAW BLOOD OFF VENOUS DEVICE: CPT

## 2023-10-20 PROCEDURE — 85055 RETICULATED PLATELET ASSAY: CPT

## 2023-10-20 PROCEDURE — 2500000003 HC RX 250 WO HCPCS: Performed by: RADIOLOGY

## 2023-10-20 PROCEDURE — 36415 COLL VENOUS BLD VENIPUNCTURE: CPT

## 2023-10-20 PROCEDURE — 85049 AUTOMATED PLATELET COUNT: CPT

## 2023-10-20 PROCEDURE — C1729 CATH, DRAINAGE: HCPCS

## 2023-10-20 PROCEDURE — 89051 BODY FLUID CELL COUNT: CPT

## 2023-10-20 PROCEDURE — 85610 PROTHROMBIN TIME: CPT

## 2023-10-20 PROCEDURE — 49083 ABD PARACENTESIS W/IMAGING: CPT

## 2023-10-20 RX ORDER — LIDOCAINE HYDROCHLORIDE 20 MG/ML
INJECTION, SOLUTION INFILTRATION; PERINEURAL PRN
Status: COMPLETED | OUTPATIENT
Start: 2023-10-20 | End: 2023-10-20

## 2023-10-20 RX ADMIN — LIDOCAINE HYDROCHLORIDE 10 ML: 20 INJECTION, SOLUTION INFILTRATION; PERINEURAL at 08:47

## 2023-10-20 NOTE — PROGRESS NOTES
Patient arrived via private car with self to Radiology department for para. Allergies, home medications, H&P and fasting instructions reviewed with patient. Vital signs taken. 20g r fa IV placed, blood obtained, IV flushed and prn adapter attached. Blood sample sent to lab for ordered tests. Procedural instructions given, questions answered, understanding expressed and consent signed. Patient given fluoroscopy education, no questions at this time.

## 2023-11-01 ENCOUNTER — OFFICE VISIT (OUTPATIENT)
Dept: GASTROENTEROLOGY | Age: 68
End: 2023-11-01
Payer: COMMERCIAL

## 2023-11-01 VITALS
BODY MASS INDEX: 22.18 KG/M2 | SYSTOLIC BLOOD PRESSURE: 138 MMHG | WEIGHT: 159 LBS | TEMPERATURE: 98 F | DIASTOLIC BLOOD PRESSURE: 60 MMHG | HEART RATE: 74 BPM | OXYGEN SATURATION: 93 %

## 2023-11-01 DIAGNOSIS — K70.31 ALCOHOLIC CIRRHOSIS OF LIVER WITH ASCITES (HCC): Primary | ICD-10-CM

## 2023-11-01 DIAGNOSIS — N62 GYNECOMASTIA: ICD-10-CM

## 2023-11-01 DIAGNOSIS — Z12.11 COLON CANCER SCREENING: ICD-10-CM

## 2023-11-01 PROCEDURE — 1123F ACP DISCUSS/DSCN MKR DOCD: CPT | Performed by: STUDENT IN AN ORGANIZED HEALTH CARE EDUCATION/TRAINING PROGRAM

## 2023-11-01 PROCEDURE — 99213 OFFICE O/P EST LOW 20 MIN: CPT | Performed by: STUDENT IN AN ORGANIZED HEALTH CARE EDUCATION/TRAINING PROGRAM

## 2023-11-03 ENCOUNTER — HOSPITAL ENCOUNTER (OUTPATIENT)
Dept: INTERVENTIONAL RADIOLOGY/VASCULAR | Age: 68
End: 2023-11-03
Payer: COMMERCIAL

## 2023-11-03 ENCOUNTER — HOSPITAL ENCOUNTER (EMERGENCY)
Age: 68
Discharge: HOME | End: 2023-11-03
Attending: EMERGENCY MEDICINE
Payer: COMMERCIAL

## 2023-11-03 VITALS
RESPIRATION RATE: 16 BRPM | TEMPERATURE: 98.7 F | OXYGEN SATURATION: 95 % | DIASTOLIC BLOOD PRESSURE: 61 MMHG | SYSTOLIC BLOOD PRESSURE: 137 MMHG | HEART RATE: 70 BPM

## 2023-11-03 VITALS
TEMPERATURE: 97.8 F | OXYGEN SATURATION: 98 % | HEART RATE: 69 BPM | DIASTOLIC BLOOD PRESSURE: 90 MMHG | RESPIRATION RATE: 16 BRPM | SYSTOLIC BLOOD PRESSURE: 129 MMHG

## 2023-11-03 DIAGNOSIS — E87.6 HYPOKALEMIA: Primary | ICD-10-CM

## 2023-11-03 DIAGNOSIS — K70.31 ALCOHOLIC CIRRHOSIS OF LIVER WITH ASCITES (HCC): ICD-10-CM

## 2023-11-03 LAB
ALBUMIN SERPL-MCNC: 3.1 G/DL (ref 3.5–5.2)
ALP SERPL-CCNC: 234 U/L (ref 40–129)
ALT SERPL-CCNC: 18 U/L (ref 0–40)
ANION GAP SERPL CALCULATED.3IONS-SCNC: 10 MMOL/L (ref 7–16)
ANION GAP SERPL CALCULATED.3IONS-SCNC: 6 MMOL/L (ref 7–16)
AST SERPL-CCNC: 139 U/L (ref 0–39)
BASOPHILS # BLD: 0.07 K/UL (ref 0–0.2)
BASOPHILS NFR BLD: 1 % (ref 0–2)
BILIRUB SERPL-MCNC: 4.1 MG/DL (ref 0–1.2)
BUN SERPL-MCNC: 16 MG/DL (ref 6–23)
BUN SERPL-MCNC: 17 MG/DL (ref 6–23)
CALCIUM SERPL-MCNC: 8.6 MG/DL (ref 8.6–10.2)
CALCIUM SERPL-MCNC: 8.9 MG/DL (ref 8.6–10.2)
CHLORIDE SERPL-SCNC: 92 MMOL/L (ref 98–107)
CHLORIDE SERPL-SCNC: 93 MMOL/L (ref 98–107)
CO2 SERPL-SCNC: 33 MMOL/L (ref 22–29)
CO2 SERPL-SCNC: 36 MMOL/L (ref 22–29)
CREAT SERPL-MCNC: 0.6 MG/DL (ref 0.7–1.2)
CREAT SERPL-MCNC: 0.7 MG/DL (ref 0.7–1.2)
EOSINOPHIL # BLD: 0.14 K/UL (ref 0.05–0.5)
EOSINOPHILS RELATIVE PERCENT: 2 % (ref 0–6)
ERYTHROCYTE [DISTWIDTH] IN BLOOD BY AUTOMATED COUNT: 12.9 % (ref 11.5–15)
GFR SERPL CREATININE-BSD FRML MDRD: >60 ML/MIN/1.73M2
GFR SERPL CREATININE-BSD FRML MDRD: >60 ML/MIN/1.73M2
GLUCOSE SERPL-MCNC: 118 MG/DL (ref 74–99)
GLUCOSE SERPL-MCNC: 145 MG/DL (ref 74–99)
HCT VFR BLD AUTO: 37.4 % (ref 37–54)
HGB BLD-MCNC: 12.7 G/DL (ref 12.5–16.5)
IMM GRANULOCYTES # BLD AUTO: <0.03 K/UL (ref 0–0.58)
IMM GRANULOCYTES NFR BLD: 0 % (ref 0–5)
INR PPP: 1.4
LYMPHOCYTES NFR BLD: 1.16 K/UL (ref 1.5–4)
LYMPHOCYTES RELATIVE PERCENT: 16 % (ref 20–42)
MAGNESIUM SERPL-MCNC: 2 MG/DL (ref 1.6–2.6)
MCH RBC QN AUTO: 32.7 PG (ref 26–35)
MCHC RBC AUTO-ENTMCNC: 34 G/DL (ref 32–34.5)
MCV RBC AUTO: 96.4 FL (ref 80–99.9)
MONOCYTES NFR BLD: 0.85 K/UL (ref 0.1–0.95)
MONOCYTES NFR BLD: 11 % (ref 2–12)
NEUTROPHILS NFR BLD: 70 % (ref 43–80)
NEUTS SEG NFR BLD: 5.2 K/UL (ref 1.8–7.3)
PLATELET, FLUORESCENCE: 93 K/UL (ref 130–450)
PMV BLD AUTO: 10.7 FL (ref 7–12)
POTASSIUM SERPL-SCNC: 2.5 MMOL/L (ref 3.5–5)
POTASSIUM SERPL-SCNC: 3 MMOL/L (ref 3.5–5)
PROT SERPL-MCNC: 7.9 G/DL (ref 6.4–8.3)
PROTHROMBIN TIME: 14.8 SEC (ref 9.3–12.4)
RBC # BLD AUTO: 3.88 M/UL (ref 3.8–5.8)
SODIUM SERPL-SCNC: 135 MMOL/L (ref 132–146)
SODIUM SERPL-SCNC: 135 MMOL/L (ref 132–146)
WBC OTHER # BLD: 7.4 K/UL (ref 4.5–11.5)

## 2023-11-03 PROCEDURE — 85025 COMPLETE CBC W/AUTO DIFF WBC: CPT

## 2023-11-03 PROCEDURE — 49083 ABD PARACENTESIS W/IMAGING: CPT

## 2023-11-03 PROCEDURE — 99284 EMERGENCY DEPT VISIT MOD MDM: CPT

## 2023-11-03 PROCEDURE — 96365 THER/PROPH/DIAG IV INF INIT: CPT

## 2023-11-03 PROCEDURE — 2500000003 HC RX 250 WO HCPCS: Performed by: PHYSICIAN ASSISTANT

## 2023-11-03 PROCEDURE — 96361 HYDRATE IV INFUSION ADD-ON: CPT

## 2023-11-03 PROCEDURE — 6370000000 HC RX 637 (ALT 250 FOR IP): Performed by: EMERGENCY MEDICINE

## 2023-11-03 PROCEDURE — 49084 PERITONEAL LAVAGE: CPT

## 2023-11-03 PROCEDURE — 80048 BASIC METABOLIC PNL TOTAL CA: CPT

## 2023-11-03 PROCEDURE — 83735 ASSAY OF MAGNESIUM: CPT

## 2023-11-03 PROCEDURE — 80053 COMPREHEN METABOLIC PANEL: CPT

## 2023-11-03 PROCEDURE — 6360000002 HC RX W HCPCS: Performed by: EMERGENCY MEDICINE

## 2023-11-03 PROCEDURE — 85610 PROTHROMBIN TIME: CPT

## 2023-11-03 PROCEDURE — C1729 CATH, DRAINAGE: HCPCS

## 2023-11-03 RX ORDER — POTASSIUM CHLORIDE 20 MEQ/1
20 TABLET, EXTENDED RELEASE ORAL DAILY
Qty: 10 TABLET | Refills: 0 | Status: SHIPPED | OUTPATIENT
Start: 2023-11-03

## 2023-11-03 RX ORDER — MAGNESIUM SULFATE IN WATER 40 MG/ML
2000 INJECTION, SOLUTION INTRAVENOUS ONCE
Status: COMPLETED | OUTPATIENT
Start: 2023-11-03 | End: 2023-11-03

## 2023-11-03 RX ORDER — POTASSIUM CHLORIDE 20 MEQ/1
40 TABLET, EXTENDED RELEASE ORAL ONCE
Status: COMPLETED | OUTPATIENT
Start: 2023-11-03 | End: 2023-11-03

## 2023-11-03 RX ORDER — POTASSIUM CHLORIDE 7.45 MG/ML
10 INJECTION INTRAVENOUS
Status: COMPLETED | OUTPATIENT
Start: 2023-11-03 | End: 2023-11-03

## 2023-11-03 RX ORDER — LIDOCAINE HYDROCHLORIDE 20 MG/ML
INJECTION, SOLUTION INFILTRATION; PERINEURAL PRN
Status: COMPLETED | OUTPATIENT
Start: 2023-11-03 | End: 2023-11-03

## 2023-11-03 RX ADMIN — POTASSIUM CHLORIDE 10 MEQ: 7.46 INJECTION, SOLUTION INTRAVENOUS at 13:50

## 2023-11-03 RX ADMIN — POTASSIUM CHLORIDE 10 MEQ: 7.46 INJECTION, SOLUTION INTRAVENOUS at 12:46

## 2023-11-03 RX ADMIN — MAGNESIUM SULFATE HEPTAHYDRATE 2000 MG: 40 INJECTION, SOLUTION INTRAVENOUS at 10:39

## 2023-11-03 RX ADMIN — POTASSIUM CHLORIDE 10 MEQ: 7.46 INJECTION, SOLUTION INTRAVENOUS at 10:41

## 2023-11-03 RX ADMIN — POTASSIUM CHLORIDE 10 MEQ: 7.46 INJECTION, SOLUTION INTRAVENOUS at 14:57

## 2023-11-03 RX ADMIN — POTASSIUM CHLORIDE 40 MEQ: 1500 TABLET, EXTENDED RELEASE ORAL at 10:34

## 2023-11-03 RX ADMIN — POTASSIUM CHLORIDE 10 MEQ: 7.46 INJECTION, SOLUTION INTRAVENOUS at 10:39

## 2023-11-03 RX ADMIN — LIDOCAINE HYDROCHLORIDE 10 ML: 20 INJECTION, SOLUTION INFILTRATION; PERINEURAL at 08:43

## 2023-11-03 ASSESSMENT — ENCOUNTER SYMPTOMS
CHEST TIGHTNESS: 0
SHORTNESS OF BREATH: 0

## 2023-11-03 NOTE — PROCEDURES
Patient arrived via ambulatory to Radiology department for Paracentesis. Allergies, home medications, H&P reviewed with patient. Vital signs taken. 20g IV placed into right AC, blood obtained, IV flushed and prn adapter attached. Blood sample sent to lab for ordered tests. Procedural instructions given, questions answered, understanding expressed and consent signed. Patient has no questions at this time.

## 2023-11-03 NOTE — INTERVAL H&P NOTE
IR H&P UPDATE NOTE  [facility]    Patient's History and Physical Examination were reviewed from current hospital admission records. Horace Santoyo appears likely to able to tolerate the requested Paracentesis procedure by the consultant. Risk and benefits were discussed with patient including ultimate complications, possibly death and consent was obtained.     Electronically signed by JUAN ALBERTO Pierson   DD: 11/3/23  9:21 AM

## 2023-11-03 NOTE — ED PROVIDER NOTES
initiate urgent interventions for this patient would likely result in a life threatening deterioration or permanent disability. Accordingly this patient received the above mentioned time, excluding separately billable procedures. --------------------------------- ADDITIONAL PROVIDER NOTES ---------------------------------  At this time the patient is without objective evidence of an acute process requiring hospitalization or inpatient management. They have remained hemodynamically stable throughout their entire ED visit and are stable for discharge with outpatient follow-up. The plan has been discussed in detail and they are aware of the specific conditions for emergent return, as well as the importance of follow-up. Discharge Medication List as of 11/3/2023  6:02 PM        START taking these medications    Details   potassium chloride (KLOR-CON M) 20 MEQ extended release tablet Take 1 tablet by mouth daily, Disp-10 tablet, R-0Normal             Diagnosis:  1. Hypokalemia        Disposition:  Patient's disposition: Discharge to home  Patient's condition is stable.                  Khang Garrido DO  11/03/23 2008

## 2023-11-03 NOTE — BRIEF OP NOTE
Brief-Op Note  ______________________________________________________________      IR U/S GUIDED PARACENTESIS  [address] SPECIAL PROCEDURES    Patient Name: Bart Goldberg   YOB: 1955  Medical Record Number: [id number]  Date of Procedure: 11/3/23  Room/Bed: Room/bed info not found      Pre-operative Diagnosis: Ascites    Post-operative Diagnosis: Ascites    Consent: Informed consent was obtained from the patient prior to the procedure. The details of the procedure, as well is its risks, benefits, and alternatives, were explained. Anesthesia: Local anesthesia.     Performed by: JUAN ALBERTO Hare under on-site supervision by Libia Morris MD.    Estimated blood loss: Minimal    Complications: None    Implants: None    (See radiology dictation in [facility] for image review and additional procedural information)    Electronically signed by JUAN ALBERTO Hare   DD: 11/3/23  9:21 AM

## 2023-11-03 NOTE — OR NURSING
Call received from lab, they spoke with Britton Daniel, [address] [place] [address]. Critical potassium of 2.5. Per JUAN ALBERTO Hare, patient to report to the Emergency department at this time. Patient transported via wheelchair, accompanied by friend to ED triage by this RN.

## 2023-11-03 NOTE — OR NURSING
Paracentesis catheter removed at this time. 4X4, tegaderm applied to site. Site clean and dry. 3700cc of clear yellow fluid removed.

## 2023-11-03 NOTE — OP NOTE
Operative Note  ______________________________________________________________      IR U/S GUIDED PARACENTESIS  [address] [facility] SPECIAL PROCEDURES    Patient Name: Gisselle Saleh   YOB: 1955  Medical Record Number: [id number]  Date of Procedure: 11/3/23  Room/Bed: Room/bed info not found    Pre-operative Diagnosis: Ascites    Post-operative Diagnosis: Ascites    Consent: Informed consent was obtained from the patient prior to the procedure. The details of the procedure, as well is its risks, benefits, and alternatives, were explained. Anesthesia: Local anesthesia    Performed by: JUAN ALBERTO Garcia under on-site supervision by Doyle Bailon MD.    Estimated blood loss: Minimal    Complications: None    Implants: None    Procedure: Routine scanning of all four abdominal quadrants was performed using real-time ultrasound and revealed sufficient amount of ascites fluid present. Decision was made to proceed with procedure. After obtaining consent, the patient was placed in the supine position with the head of the bed slightly elevated and the appropriate landmarks were identified. The skin over the puncture site in the right lower quadrant region was prepped with betadine and draped in a sterile fashion. Local anesthesia was obtained by infiltration using 2% Lidocaine without epinephrine. A 5 Guatemalan needle sheath catheter was then advanced into the abdominal cavity. Fluid return was clear yellow colored. A total volume of  3700mL was withdrawn. The catheter was then withdrawn and a sterile dressing was placed over the site. The patient tolerated the procedure well. Complications: None. Estimated Blood Loss: < 10cc. Lab reported critically low K+ level, patient taken to ER for further eval and treatment. End of procedure note. .       Electronically signed by JUAN ALBERTO Garcia   DD: 11/3/23  9:21 AM

## 2023-11-07 ENCOUNTER — TELEPHONE (OUTPATIENT)
Age: 68
End: 2023-11-07

## 2023-11-07 DIAGNOSIS — K70.31 ALCOHOLIC CIRRHOSIS OF LIVER WITH ASCITES (HCC): Primary | ICD-10-CM

## 2023-11-07 RX ORDER — POTASSIUM CHLORIDE 20 MEQ/1
20 TABLET, EXTENDED RELEASE ORAL DAILY
Qty: 30 TABLET | Refills: 1 | Status: SHIPPED | OUTPATIENT
Start: 2023-11-07

## 2023-11-07 NOTE — TELEPHONE ENCOUNTER
Pt left message about labs completed last week. He had some issues with low potassium and is asking about results. Follow up not until next year with dr Regina Howard.  Electronically signed by Sera Brown LPN on 96/9/9807 at 28:71 AM

## 2023-11-08 LAB
EKG ATRIAL RATE: 69 BPM
EKG P AXIS: 74 DEGREES
EKG P-R INTERVAL: 150 MS
EKG Q-T INTERVAL: 464 MS
EKG QRS DURATION: 92 MS
EKG QTC CALCULATION (BAZETT): 497 MS
EKG R AXIS: 101 DEGREES
EKG T AXIS: 73 DEGREES
EKG VENTRICULAR RATE: 69 BPM

## 2023-11-10 PROBLEM — Z12.11 SPECIAL SCREENING FOR MALIGNANT NEOPLASMS, COLON: Status: ACTIVE | Noted: 2023-09-06

## 2023-11-14 PROBLEM — N62 GYNECOMASTIA: Status: ACTIVE | Noted: 2023-11-14

## 2023-11-17 ENCOUNTER — HOSPITAL ENCOUNTER (OUTPATIENT)
Dept: INTERVENTIONAL RADIOLOGY/VASCULAR | Age: 68
Discharge: HOME | End: 2023-11-19
Payer: COMMERCIAL

## 2023-11-17 VITALS
DIASTOLIC BLOOD PRESSURE: 67 MMHG | SYSTOLIC BLOOD PRESSURE: 139 MMHG | WEIGHT: 150 LBS | RESPIRATION RATE: 20 BRPM | HEIGHT: 71 IN | HEART RATE: 79 BPM | TEMPERATURE: 98.1 F | OXYGEN SATURATION: 95 % | BODY MASS INDEX: 21 KG/M2

## 2023-11-17 DIAGNOSIS — K70.31 ALCOHOLIC CIRRHOSIS OF LIVER WITH ASCITES (HCC): ICD-10-CM

## 2023-11-17 LAB
ANION GAP SERPL CALCULATED.3IONS-SCNC: 14 MMOL/L (ref 7–16)
BUN SERPL-MCNC: 8 MG/DL (ref 6–23)
CALCIUM SERPL-MCNC: 8.6 MG/DL (ref 8.6–10.2)
CHLORIDE SERPL-SCNC: 103 MMOL/L (ref 98–107)
CO2 SERPL-SCNC: 26 MMOL/L (ref 22–29)
CREAT SERPL-MCNC: 0.6 MG/DL (ref 0.7–1.2)
GFR SERPL CREATININE-BSD FRML MDRD: >60 ML/MIN/1.73M2
GLUCOSE SERPL-MCNC: 100 MG/DL (ref 74–99)
POTASSIUM SERPL-SCNC: 3.4 MMOL/L (ref 3.5–5)
SODIUM SERPL-SCNC: 143 MMOL/L (ref 132–146)

## 2023-11-17 PROCEDURE — 80048 BASIC METABOLIC PNL TOTAL CA: CPT

## 2023-11-17 PROCEDURE — 2500000003 HC RX 250 WO HCPCS: Performed by: PHYSICIAN ASSISTANT

## 2023-11-17 PROCEDURE — C1729 CATH, DRAINAGE: HCPCS

## 2023-11-17 PROCEDURE — 49083 ABD PARACENTESIS W/IMAGING: CPT

## 2023-11-17 RX ORDER — LIDOCAINE HYDROCHLORIDE 20 MG/ML
INJECTION, SOLUTION INFILTRATION; PERINEURAL PRN
Status: COMPLETED | OUTPATIENT
Start: 2023-11-17 | End: 2023-11-17

## 2023-11-17 RX ADMIN — LIDOCAINE HYDROCHLORIDE 9 ML: 20 INJECTION, SOLUTION INFILTRATION; PERINEURAL at 08:54

## 2023-11-17 NOTE — BRIEF OP NOTE
Brief-Op Note  ______________________________________________________________      IR U/S GUIDED PARACENTESIS  [address] SPECIAL PROCEDURES    Patient Name: Stephon Rosenthal   YOB: 1955  Medical Record Number: [id number]  Date of Procedure: 11/17/23  Room/Bed: Room/bed info not found      Pre-operative Diagnosis: Ascites    Post-operative Diagnosis: Ascites    Consent: Informed consent was obtained from the patient prior to the procedure. The details of the procedure, as well is its risks, benefits, and alternatives, were explained. Anesthesia: Local anesthesia.     Performed by: JUAN ALBERTO Cisneros under on-site supervision by Augusto Packer MD.    Estimated blood loss: Minimal    Complications: None    Implants: None    (See radiology dictation in [facility] for image review and additional procedural information)    Electronically signed by JUAN ALBERTO Cisneros   DD: 11/17/23  12:14 PM

## 2023-11-17 NOTE — OP NOTE
Operative Note  ______________________________________________________________      IR U/S GUIDED PARACENTESIS  [address] [facility] SPECIAL PROCEDURES    Patient Name: Zeny Callaway   YOB: 1955  Medical Record Number: [id number]  Date of Procedure: 11/17/23  Room/Bed: Room/bed info not found    Pre-operative Diagnosis: Ascites    Post-operative Diagnosis: Ascites    Consent: Informed consent was obtained from the patient prior to the procedure. The details of the procedure, as well is its risks, benefits, and alternatives, were explained. Anesthesia: Local anesthesia    Performed by: JUAN ALBERTO Kuhn under on-site supervision by Christiano Brandon MD.    Estimated blood loss: Minimal    Complications: None    Implants: None    Procedure: Routine scanning of all four abdominal quadrants was performed using real-time ultrasound and revealed sufficient amount of ascites fluid present. Decision was made to proceed with procedure. After obtaining consent, the patient was placed in the supine position with the head of the bed slightly elevated and the appropriate landmarks were identified. The skin over the puncture site in the right lower quadrant region was prepped with betadine and draped in a sterile fashion. Local anesthesia was obtained by infiltration using 2% Lidocaine without epinephrine. A 6 Nicaraguan safe-t-centesis catheter was then advanced into the abdominal cavity. Fluid return was clear yellow colored. A total volume of  3900mL was withdrawn. The catheter was then withdrawn and a sterile dressing was placed over the site. The patient tolerated the procedure well. Complications: None. Estimated Blood Loss: < 10cc. End of procedure note. .       Electronically signed by JUAN ALBERTO Kuhn   DD: 11/17/23  12:14 PM

## 2023-11-17 NOTE — INTERVAL H&P NOTE
IR H&P UPDATE NOTE  [address]    Patient's History and Physical Examination were reviewed from current hospital admission records. Amanda Santos appears likely to able to tolerate the requested Paracentesis procedure by the consultant. Risk and benefits were discussed with patient including ultimate complications, possibly death and consent was obtained.     Electronically signed by JUAN ALBERTO Julian   DD: 11/17/23  12:13 PM

## 2023-11-17 NOTE — PROCEDURES
0815Patient arrived  to Radiology department for  paracentesis. Allergies, home medications, H&P and fasting instructions reviewed with patient. Vital signs taken. IV placed, blood obtained, IV flushed and prn adapter attached. Blood sample sent to lab for ordered tests. 1399 Procedural instructions given JUAN ALBERTO Jama, questions answered, understanding expressed and consent signed.  Patient given fluoroscopy education, no questions at this time

## 2023-12-01 ENCOUNTER — HOSPITAL ENCOUNTER (OUTPATIENT)
Dept: INTERVENTIONAL RADIOLOGY/VASCULAR | Age: 68
End: 2023-12-01
Payer: COMMERCIAL

## 2023-12-01 VITALS
OXYGEN SATURATION: 98 % | HEART RATE: 81 BPM | TEMPERATURE: 98.6 F | DIASTOLIC BLOOD PRESSURE: 68 MMHG | RESPIRATION RATE: 18 BRPM | SYSTOLIC BLOOD PRESSURE: 150 MMHG

## 2023-12-01 DIAGNOSIS — K70.31 ALCOHOLIC CIRRHOSIS OF LIVER WITH ASCITES (HCC): ICD-10-CM

## 2023-12-01 PROCEDURE — 76705 ECHO EXAM OF ABDOMEN: CPT

## 2023-12-01 NOTE — PROGRESS NOTES
Patient arrived via private car with son to Radiology department for paracentesis. Allergies, home medications, H&P and fasting instructions reviewed with patient. Vital signs taken. Procedural instructions given, questions answered, understanding expressed and consent signed. Patient given fluoroscopy education, no questions at this time.

## 2023-12-01 NOTE — PROGRESS NOTES
Pt walked out to exit with rn, pt ambulated on can independently. No fluid was needed to be drained. US pictures were saved. Please see JUAN ALBERTO Alas's notes to verify.

## 2023-12-01 NOTE — BRIEF OP NOTE
Brief-Op Note  ______________________________________________________________      LIMITED U/S ABDOMEN FOR POSS. [address] SPECIAL PROCEDURES    Patient Name: Omar Vanegas   YOB: 1955  Medical Record Number: [id number]  Date of Procedure: 12/1/23  Room/Bed: Room/bed info not found      Pre-operative Diagnosis: Ascites    Post-operative Diagnosis: minimal ascites insufficient for paracentesis. Consent: Informed consent was obtained from the patient prior to the procedure. The details of the procedure, as well is its risks, benefits, and alternatives, were explained. Performed by: JUAN ALBERTO Moser under on-site supervision by Claudia Villalba MD.    Procedure: Routine scanning of all four abdominal quadrants was performed using real-time ultrasound and revealed insufficient amount of ascites fluid present. Decision was made not to proceed with procedure due to insufficient fluid present. Risks and benefits were discussed with patient/family and agree not to proceed at this time. .     Complications: None    Specimen Obtained: None    (See radiology dictation in [facility] for image review and additional procedural information)    Electronically signed by JUAN ALBERTO Moser   DD: 12/1/23  9:49 AM

## 2023-12-10 PROBLEM — Z12.11 SPECIAL SCREENING FOR MALIGNANT NEOPLASMS, COLON: Status: RESOLVED | Noted: 2023-09-06 | Resolved: 2023-12-10

## 2023-12-15 ENCOUNTER — HOSPITAL ENCOUNTER (OUTPATIENT)
Dept: INTERVENTIONAL RADIOLOGY/VASCULAR | Age: 68
End: 2023-12-15
Payer: COMMERCIAL

## 2023-12-15 VITALS
DIASTOLIC BLOOD PRESSURE: 66 MMHG | OXYGEN SATURATION: 97 % | HEIGHT: 71 IN | BODY MASS INDEX: 21 KG/M2 | TEMPERATURE: 99.5 F | HEART RATE: 89 BPM | RESPIRATION RATE: 18 BRPM | WEIGHT: 150 LBS | SYSTOLIC BLOOD PRESSURE: 161 MMHG

## 2023-12-15 DIAGNOSIS — K70.31 ALCOHOLIC CIRRHOSIS OF LIVER WITH ASCITES (HCC): ICD-10-CM

## 2023-12-15 PROCEDURE — 49083 ABD PARACENTESIS W/IMAGING: CPT

## 2023-12-15 PROCEDURE — 76705 ECHO EXAM OF ABDOMEN: CPT

## 2023-12-15 NOTE — BRIEF OP NOTE
Brief-Op Note  ______________________________________________________________      LIMITED U/S ABDOMEN FOR POSS. [address] SPECIAL PROCEDURES    Patient Name: Zeny Callaway   YOB: 1955  Medical Record Number: [id number]  Date of Procedure: 12/15/23  Room/Bed: Room/bed info not found      Pre-operative Diagnosis: Ascites    Post-operative Diagnosis: minimal ascites insufficient for paracentesis. Consent: Informed consent was obtained from the patient prior to the procedure. The details of the procedure, as well is its risks, benefits, and alternatives, were explained. Performed by: JUAN ALBERTO Kuhn under on-site supervision by Chanel Esposito MD.    Procedure: Routine scanning of all four abdominal quadrants was performed using real-time ultrasound and revealed trace amount of ascites fluid present posterior to spleen and liver. Decision was made not to proceed with procedure due to insufficient fluid present. Risks and benefits were discussed with patient/family and agree not to proceed at this time. .     Complications: None    Specimen Obtained: None    (See radiology dictation in [facility] for image review and additional procedural information)    Electronically signed by JUAN ALBERTO Kuhn   DD: 12/15/23  8:17 AM

## 2023-12-15 NOTE — PROGRESS NOTES
Patient arrived via private vehicle with friend to Radiology department for an image guided paracentesis. Allergies, home medications, H&P and fasting instructions reviewed with patient. Vital signs taken. No IV placed at this time. Procedural instructions given, questions answered, understanding expressed and consent signed.

## 2023-12-26 DIAGNOSIS — R18.8 OTHER ASCITES: Primary | ICD-10-CM

## 2024-01-01 ENCOUNTER — OUTSIDE SERVICES (OUTPATIENT)
Dept: PRIMARY CARE CLINIC | Age: 69
End: 2024-01-01

## 2024-01-01 ENCOUNTER — OFFICE VISIT (OUTPATIENT)
Age: 69
End: 2024-01-01
Payer: COMMERCIAL

## 2024-01-01 VITALS — HEIGHT: 71 IN | BODY MASS INDEX: 22.32 KG/M2

## 2024-01-01 DIAGNOSIS — I85.01 BLEEDING ESOPHAGEAL VARICES, UNSPECIFIED ESOPHAGEAL VARICES TYPE (HCC): ICD-10-CM

## 2024-01-01 DIAGNOSIS — K70.31 ALCOHOLIC CIRRHOSIS OF LIVER WITH ASCITES (HCC): ICD-10-CM

## 2024-01-01 DIAGNOSIS — K70.31 ALCOHOLIC CIRRHOSIS OF LIVER WITH ASCITES (HCC): Primary | ICD-10-CM

## 2024-01-01 DIAGNOSIS — I42.9 PRIMARY CARDIOMYOPATHY (HCC): ICD-10-CM

## 2024-01-01 DIAGNOSIS — I25.10 CORONARY ARTERY DISEASE INVOLVING NATIVE CORONARY ARTERY OF NATIVE HEART WITHOUT ANGINA PECTORIS: ICD-10-CM

## 2024-01-01 DIAGNOSIS — R53.1 WEAKNESS: ICD-10-CM

## 2024-01-01 DIAGNOSIS — I50.22 CHRONIC SYSTOLIC HEART FAILURE (HCC): ICD-10-CM

## 2024-01-01 DIAGNOSIS — I21.01 ST ELEVATION MYOCARDIAL INFARCTION INVOLVING LEFT MAIN CORONARY ARTERY (HCC): Primary | ICD-10-CM

## 2024-01-01 DIAGNOSIS — I21.01 ST ELEVATION MYOCARDIAL INFARCTION INVOLVING LEFT MAIN CORONARY ARTERY (HCC): ICD-10-CM

## 2024-01-01 PROCEDURE — 1123F ACP DISCUSS/DSCN MKR DOCD: CPT | Performed by: NURSE PRACTITIONER

## 2024-01-01 PROCEDURE — 99212 OFFICE O/P EST SF 10 MIN: CPT | Performed by: NURSE PRACTITIONER

## 2024-01-10 DIAGNOSIS — K70.31 ALCOHOLIC CIRRHOSIS OF LIVER WITH ASCITES (HCC): Primary | ICD-10-CM

## 2024-01-10 DIAGNOSIS — K70.31 ALCOHOLIC CIRRHOSIS OF LIVER WITH ASCITES (HCC): ICD-10-CM

## 2024-01-10 RX ORDER — FUROSEMIDE 40 MG/1
40 TABLET ORAL 2 TIMES DAILY
Qty: 60 TABLET | Refills: 3 | Status: SHIPPED | OUTPATIENT
Start: 2024-01-10

## 2024-01-10 RX ORDER — POTASSIUM CHLORIDE 20 MEQ/1
20 TABLET, EXTENDED RELEASE ORAL DAILY
Qty: 30 TABLET | Refills: 1 | Status: CANCELLED | OUTPATIENT
Start: 2024-01-10

## 2024-01-12 ENCOUNTER — HOSPITAL ENCOUNTER (OUTPATIENT)
Dept: INTERVENTIONAL RADIOLOGY/VASCULAR | Age: 69
End: 2024-01-12
Payer: COMMERCIAL

## 2024-01-12 VITALS
RESPIRATION RATE: 16 BRPM | HEART RATE: 72 BPM | DIASTOLIC BLOOD PRESSURE: 71 MMHG | OXYGEN SATURATION: 98 % | SYSTOLIC BLOOD PRESSURE: 147 MMHG | TEMPERATURE: 98.5 F

## 2024-01-12 DIAGNOSIS — R18.8 OTHER ASCITES: ICD-10-CM

## 2024-01-12 LAB
ANION GAP SERPL CALCULATED.3IONS-SCNC: 15 MMOL/L (ref 7–16)
BASOPHILS # BLD: 0.06 K/UL (ref 0–0.2)
BASOPHILS NFR BLD: 1 % (ref 0–2)
BUN SERPL-MCNC: 9 MG/DL (ref 6–23)
CALCIUM SERPL-MCNC: 8.9 MG/DL (ref 8.6–10.2)
CHLORIDE SERPL-SCNC: 99 MMOL/L (ref 98–107)
CO2 SERPL-SCNC: 29 MMOL/L (ref 22–29)
CREAT SERPL-MCNC: 0.7 MG/DL (ref 0.7–1.2)
EOSINOPHIL # BLD: 0.09 K/UL (ref 0.05–0.5)
EOSINOPHILS RELATIVE PERCENT: 2 % (ref 0–6)
ERYTHROCYTE [DISTWIDTH] IN BLOOD BY AUTOMATED COUNT: 13.9 % (ref 11.5–15)
GFR SERPL CREATININE-BSD FRML MDRD: >60 ML/MIN/1.73M2
GLUCOSE SERPL-MCNC: 105 MG/DL (ref 74–99)
HCT VFR BLD AUTO: 40.1 % (ref 37–54)
HGB BLD-MCNC: 13.9 G/DL (ref 12.5–16.5)
IMM GRANULOCYTES # BLD AUTO: <0.03 K/UL (ref 0–0.58)
IMM GRANULOCYTES NFR BLD: 0 % (ref 0–5)
INR PPP: 1.3
LYMPHOCYTES NFR BLD: 1.21 K/UL (ref 1.5–4)
LYMPHOCYTES RELATIVE PERCENT: 24 % (ref 20–42)
MCH RBC QN AUTO: 32.2 PG (ref 26–35)
MCHC RBC AUTO-ENTMCNC: 34.7 G/DL (ref 32–34.5)
MCV RBC AUTO: 92.8 FL (ref 80–99.9)
MONOCYTES NFR BLD: 0.59 K/UL (ref 0.1–0.95)
MONOCYTES NFR BLD: 12 % (ref 2–12)
NEUTROPHILS NFR BLD: 61 % (ref 43–80)
NEUTS SEG NFR BLD: 3.08 K/UL (ref 1.8–7.3)
PLATELET CONFIRMATION: NORMAL
PLATELET, FLUORESCENCE: 60 K/UL (ref 130–450)
PMV BLD AUTO: 10.4 FL (ref 7–12)
POTASSIUM SERPL-SCNC: 2.9 MMOL/L (ref 3.5–5)
PROTHROMBIN TIME: 14.1 SEC (ref 9.3–12.4)
RBC # BLD AUTO: 4.32 M/UL (ref 3.8–5.8)
SODIUM SERPL-SCNC: 143 MMOL/L (ref 132–146)
WBC OTHER # BLD: 5.1 K/UL (ref 4.5–11.5)

## 2024-01-12 PROCEDURE — 80048 BASIC METABOLIC PNL TOTAL CA: CPT

## 2024-01-12 PROCEDURE — 36415 COLL VENOUS BLD VENIPUNCTURE: CPT

## 2024-01-12 PROCEDURE — 76705 ECHO EXAM OF ABDOMEN: CPT

## 2024-01-12 PROCEDURE — 85025 COMPLETE CBC W/AUTO DIFF WBC: CPT

## 2024-01-12 PROCEDURE — 85610 PROTHROMBIN TIME: CPT

## 2024-01-12 NOTE — PROCEDURES
Patient arrived via to Radiology department for paracentesis. Allergies, home medications, H&P and fasting instructions reviewed with patient. Vital signs taken. 22g IV placed in right forearm, blood obtained, IV flushed and prn adapter attached. Blood sample sent to lab for ordered tests.  Procedural instructions given, questions answered, understanding expressed and consent signed. Patient given fluoroscopy education, no questions at this time.    US exam performed by Dr. Rapp revealing no fluid to be drained at this time.     IV removed and patient discharged at 0821

## 2024-01-15 ENCOUNTER — ANESTHESIA EVENT (OUTPATIENT)
Dept: ENDOSCOPY | Age: 69
End: 2024-01-15
Payer: COMMERCIAL

## 2024-01-15 NOTE — PROGRESS NOTES
Notified Dr. Hernandez's office that PAT is unable to reach patient.  Iliana verbalized understanding.

## 2024-01-16 ENCOUNTER — HOSPITAL ENCOUNTER (OUTPATIENT)
Age: 69
Setting detail: OUTPATIENT SURGERY
Discharge: HOME | End: 2024-01-16
Attending: STUDENT IN AN ORGANIZED HEALTH CARE EDUCATION/TRAINING PROGRAM | Admitting: STUDENT IN AN ORGANIZED HEALTH CARE EDUCATION/TRAINING PROGRAM
Payer: COMMERCIAL

## 2024-01-16 ENCOUNTER — ANESTHESIA (OUTPATIENT)
Dept: ENDOSCOPY | Age: 69
End: 2024-01-16
Payer: COMMERCIAL

## 2024-01-16 VITALS
SYSTOLIC BLOOD PRESSURE: 130 MMHG | RESPIRATION RATE: 20 BRPM | HEART RATE: 88 BPM | WEIGHT: 160 LBS | TEMPERATURE: 98.1 F | HEIGHT: 71 IN | DIASTOLIC BLOOD PRESSURE: 66 MMHG | OXYGEN SATURATION: 98 % | BODY MASS INDEX: 22.4 KG/M2

## 2024-01-16 DIAGNOSIS — Z12.11 SPECIAL SCREENING FOR MALIGNANT NEOPLASMS, COLON: ICD-10-CM

## 2024-01-16 DIAGNOSIS — I85.00 ESOPHAGEAL VARICES WITHOUT BLEEDING (HCC): ICD-10-CM

## 2024-01-16 LAB
HCT VFR BLD AUTO: 35 % (ref 37–54)
POC HEMOGLOBIN (CALC): 11.9 G/DL (ref 12.5–15.5)
POTASSIUM BLD-SCNC: 3.8 MMOL/L (ref 3.5–5)

## 2024-01-16 PROCEDURE — 3700000000 HC ANESTHESIA ATTENDED CARE: Performed by: STUDENT IN AN ORGANIZED HEALTH CARE EDUCATION/TRAINING PROGRAM

## 2024-01-16 PROCEDURE — 3700000001 HC ADD 15 MINUTES (ANESTHESIA): Performed by: STUDENT IN AN ORGANIZED HEALTH CARE EDUCATION/TRAINING PROGRAM

## 2024-01-16 PROCEDURE — 7100000010 HC PHASE II RECOVERY - FIRST 15 MIN: Performed by: STUDENT IN AN ORGANIZED HEALTH CARE EDUCATION/TRAINING PROGRAM

## 2024-01-16 PROCEDURE — P9047 ALBUMIN (HUMAN), 25%, 50ML: HCPCS

## 2024-01-16 PROCEDURE — 2720000010 HC SURG SUPPLY STERILE: Performed by: STUDENT IN AN ORGANIZED HEALTH CARE EDUCATION/TRAINING PROGRAM

## 2024-01-16 PROCEDURE — 85014 HEMATOCRIT: CPT

## 2024-01-16 PROCEDURE — 88305 TISSUE EXAM BY PATHOLOGIST: CPT

## 2024-01-16 PROCEDURE — 6360000002 HC RX W HCPCS

## 2024-01-16 PROCEDURE — 84132 ASSAY OF SERUM POTASSIUM: CPT

## 2024-01-16 PROCEDURE — 2500000003 HC RX 250 WO HCPCS

## 2024-01-16 PROCEDURE — 2709999900 HC NON-CHARGEABLE SUPPLY: Performed by: STUDENT IN AN ORGANIZED HEALTH CARE EDUCATION/TRAINING PROGRAM

## 2024-01-16 PROCEDURE — 7100000011 HC PHASE II RECOVERY - ADDTL 15 MIN: Performed by: STUDENT IN AN ORGANIZED HEALTH CARE EDUCATION/TRAINING PROGRAM

## 2024-01-16 PROCEDURE — 3609017100 HC EGD: Performed by: STUDENT IN AN ORGANIZED HEALTH CARE EDUCATION/TRAINING PROGRAM

## 2024-01-16 PROCEDURE — 2580000003 HC RX 258

## 2024-01-16 PROCEDURE — 3609027000 HC COLONOSCOPY: Performed by: STUDENT IN AN ORGANIZED HEALTH CARE EDUCATION/TRAINING PROGRAM

## 2024-01-16 RX ORDER — MIDAZOLAM HYDROCHLORIDE 1 MG/ML
INJECTION INTRAMUSCULAR; INTRAVENOUS PRN
Status: DISCONTINUED | OUTPATIENT
Start: 2024-01-16 | End: 2024-01-16 | Stop reason: SDUPTHER

## 2024-01-16 RX ORDER — PROPOFOL 10 MG/ML
INJECTION, EMULSION INTRAVENOUS PRN
Status: DISCONTINUED | OUTPATIENT
Start: 2024-01-16 | End: 2024-01-16 | Stop reason: SDUPTHER

## 2024-01-16 RX ORDER — LIDOCAINE HYDROCHLORIDE 20 MG/ML
INJECTION, SOLUTION INFILTRATION; PERINEURAL PRN
Status: DISCONTINUED | OUTPATIENT
Start: 2024-01-16 | End: 2024-01-16 | Stop reason: SDUPTHER

## 2024-01-16 RX ORDER — ALBUMIN (HUMAN) 12.5 G/50ML
SOLUTION INTRAVENOUS PRN
Status: DISCONTINUED | OUTPATIENT
Start: 2024-01-16 | End: 2024-01-16 | Stop reason: SDUPTHER

## 2024-01-16 RX ORDER — SODIUM CHLORIDE 0.9 % (FLUSH) 0.9 %
5-40 SYRINGE (ML) INJECTION PRN
Status: DISCONTINUED | OUTPATIENT
Start: 2024-01-16 | End: 2024-01-16 | Stop reason: HOSPADM

## 2024-01-16 RX ORDER — SODIUM CHLORIDE 9 MG/ML
INJECTION, SOLUTION INTRAVENOUS CONTINUOUS PRN
Status: DISCONTINUED | OUTPATIENT
Start: 2024-01-16 | End: 2024-01-16 | Stop reason: SDUPTHER

## 2024-01-16 RX ORDER — ONDANSETRON 4 MG/1
4 TABLET, ORALLY DISINTEGRATING ORAL EVERY 8 HOURS PRN
Status: DISCONTINUED | OUTPATIENT
Start: 2024-01-16 | End: 2024-01-16 | Stop reason: HOSPADM

## 2024-01-16 RX ORDER — SODIUM CHLORIDE 9 MG/ML
INJECTION, SOLUTION INTRAVENOUS PRN
Status: DISCONTINUED | OUTPATIENT
Start: 2024-01-16 | End: 2024-01-16 | Stop reason: HOSPADM

## 2024-01-16 RX ORDER — PANTOPRAZOLE SODIUM 40 MG/1
40 TABLET, DELAYED RELEASE ORAL
Qty: 60 TABLET | Refills: 11 | Status: SHIPPED | OUTPATIENT
Start: 2024-01-16

## 2024-01-16 RX ORDER — ONDANSETRON 2 MG/ML
4 INJECTION INTRAMUSCULAR; INTRAVENOUS EVERY 6 HOURS PRN
Status: DISCONTINUED | OUTPATIENT
Start: 2024-01-16 | End: 2024-01-16 | Stop reason: HOSPADM

## 2024-01-16 RX ORDER — SODIUM CHLORIDE 0.9 % (FLUSH) 0.9 %
5-40 SYRINGE (ML) INJECTION EVERY 12 HOURS SCHEDULED
Status: DISCONTINUED | OUTPATIENT
Start: 2024-01-16 | End: 2024-01-16 | Stop reason: HOSPADM

## 2024-01-16 RX ORDER — NADOLOL 20 MG/1
20 TABLET ORAL DAILY
Qty: 30 TABLET | Refills: 11 | Status: SHIPPED | OUTPATIENT
Start: 2024-01-16

## 2024-01-16 RX ADMIN — SODIUM CHLORIDE: 9 INJECTION, SOLUTION INTRAVENOUS at 10:59

## 2024-01-16 RX ADMIN — LIDOCAINE HYDROCHLORIDE 40 MG: 20 INJECTION, SOLUTION INFILTRATION; PERINEURAL at 11:03

## 2024-01-16 RX ADMIN — PROPOFOL 50 MG: 10 INJECTION, EMULSION INTRAVENOUS at 11:03

## 2024-01-16 RX ADMIN — ALBUMIN (HUMAN) 25 G: 0.25 INJECTION, SOLUTION INTRAVENOUS at 11:16

## 2024-01-16 RX ADMIN — PROPOFOL 100 MCG/KG/MIN: 10 INJECTION, EMULSION INTRAVENOUS at 11:05

## 2024-01-16 RX ADMIN — MIDAZOLAM 2 MG: 1 INJECTION INTRAMUSCULAR; INTRAVENOUS at 11:03

## 2024-01-16 ASSESSMENT — PAIN - FUNCTIONAL ASSESSMENT
PAIN_FUNCTIONAL_ASSESSMENT: NONE - DENIES PAIN
PAIN_FUNCTIONAL_ASSESSMENT: 0-10

## 2024-01-16 ASSESSMENT — LIFESTYLE VARIABLES: SMOKING_STATUS: 1

## 2024-01-16 NOTE — PROGRESS NOTES
Patient resting at this time & will be given PO when criteria met. Belongings returned. Calling patient's brother for transport home.

## 2024-01-16 NOTE — DISCHARGE INSTRUCTIONS
Recommendations:  -The patient will be observed post procedure until all discharge criteria are met.    -Patient has a contact number available for emergencies.  The signs and symptoms of potential delayed complications were discussed with the patient.    -Return to normal activities tomorrow.    -Written discharge instructions were provided to the patient.    -Await pathology results.  -Timeframe until next colonoscopy will be discussed in clinic and based on Clinical Guidelines regarding size, number, and pathology of polyps removed as well as adequacy of bowel prep.   -Clinic appointment scheduled 2/7.

## 2024-01-16 NOTE — H&P
[facility]   Gastroenterology, Hepatology, &  Advanced Endoscopy    Consult       ASSESSMENT AND PLAN:    68yM w/ EtOH cirrhosis presents for variceal and colon cancer screening.    PLAN:  EGD and Colonoscopy today        HISTORY OF PRESENT ILLNESS:      Mr. Shahzad Rodgers is a 68y/M who presents to clinic in follow-up for EtOH cirrhosis.      Cirrhosis:  Etiology: EtOH  MELD: 18  HE: No symptoms  EV: Scheduled for screening.  Ascites: Doing well and is more comfortable with scheduled paracentesis q 2 weeks. He is tolerating Lasix BID and is getting less volume removed with each paracentesis. His gynecomastia has improved off of spironolactone.   HCC: No focal lesions.  CRC: To be scheduled at time of EGD.     Past Medical History:        Diagnosis Date    Alcoholism (HCC)     Prostate cancer (HCC)      Past Surgical History:        Procedure Laterality Date    PARACENTESIS Right 09/22/2023    6200mL clear yellow fluid removed    PARACENTESIS Right 10/06/2023    4850 mL drained clear yellow    PROSTATECTOMY  2006     Social History:    TOBACCO:   reports that he has been smoking cigarettes. He started smoking about 54 years ago. He has a 27.0 pack-year smoking history. He has never used smokeless tobacco.  ETOH:   reports that he does not currently use alcohol.  DRUGS:   reports no history of drug use.  Family History:       Problem Relation Age of Onset    Diabetes Mother        No current facility-administered medications for this encounter.    Current Outpatient Medications:     furosemide (LASIX) 40 MG tablet, take 1 tablet by mouth twice a day, Disp: 60 tablet, Rfl: 3    potassium chloride (KLOR-CON M) 20 MEQ extended release tablet, Take 1 tablet by mouth daily, Disp: 30 tablet, Rfl: 1    spironolactone (ALDACTONE) 50 MG tablet, Take 1 tablet by mouth 2 times daily (Patient not taking: Reported on 11/17/2023), Disp: 180 tablet, Rfl: 1     Allergies:  Penicillins    ROS:  General: Patient denies n/v/f/c or

## 2024-01-16 NOTE — ANESTHESIA PRE PROCEDURE
72 hours.    Coags:   Lab Results   Component Value Date/Time    PROTIME 14.1 01/12/2024 08:00 AM    INR 1.3 01/12/2024 08:00 AM       HCG (If Applicable): No results found for: \"PREGTESTUR\", \"PREGSERUM\", \"HCG\", \"HCGQUANT\"     ABGs: No results found for: \"PHART\", \"PO2ART\", \"ZFJ7STA\", \"DIJ0YNO\", \"BEART\", \"Z2QIAWDC\"     Type & Screen (If Applicable):  No results found for: \"LABABO\", \"LABRH\"    Drug/Infectious Status (If Applicable):  Lab Results   Component Value Date/Time    HEPCAB NONREACTIVE 09/14/2023 08:33 AM       COVID-19 Screening (If Applicable): No results found for: \"COVID19\"        Anesthesia Evaluation  Patient summary reviewed and Nursing notes reviewed   no history of anesthetic complications:   Airway: Mallampati: II  TM distance: >3 FB   Neck ROM: full  Mouth opening: > = 3 FB   Dental:          Pulmonary:normal exam  breath sounds clear to auscultation  (+)           current smoker (1/2 ppd)          Patient smoked on day of surgery.                 Cardiovascular:Negative CV ROS  Exercise tolerance: good (>4 METS)        ECG reviewed  Rhythm: regular  Rate: normal  Echocardiogram reviewed         Beta Blocker:  Not on Beta Blocker         Neuro/Psych:                ROS comment: Tremors GI/Hepatic/Renal:   (+) liver disease:         ROS comment: Cirrhosis  Ascites.   Endo/Other:    (+) malignancy/cancer (Prostate).                 Abdominal:             Vascular: negative vascular ROS.         Other Findings:             Anesthesia Plan      MAC     ASA 3       Induction: intravenous.      Anesthetic plan and risks discussed with patient.      Plan discussed with CRNA and attending.                  Usha Herr RN   1/16/2024

## 2024-01-16 NOTE — ANESTHESIA POSTPROCEDURE EVALUATION
Department of Anesthesiology  Postprocedure Note    Patient: Shahzad Rodgers  MRN: [id number]  YOB: 1955  Date of evaluation: 1/16/2024    Procedure Summary       Date: 01/16/24 Room / Location: [place] [address] / [facility]    Anesthesia Start: 1059 Anesthesia Stop: 1204    Procedures:       EGD ESOPHAGOGASTRODUODENOSCOPY      COLORECTAL CANCER SCREENING, NOT HIGH RISK Diagnosis:       Esophageal varices without bleeding (HCC)      Special screening for malignant neoplasms, colon      (Esophageal varices without bleeding (HCC) [I85.00])      (Special screening for malignant neoplasms, colon [Z12.11])    Surgeons: Leroy Hernandez MD Responsible Provider: Guillermina Fink MD    Anesthesia Type: MAC ASA Status: 3            Anesthesia Type: No value filed.    Ipo Phase I: Pio Score: 10    Pio Phase II: Pio Score: 10    Anesthesia Post Evaluation    Patient location during evaluation: PACU  Patient participation: complete - patient participated  Level of consciousness: awake and alert  Airway patency: patent  Nausea & Vomiting: no nausea and no vomiting  Cardiovascular status: blood pressure returned to baseline and hemodynamically stable  Respiratory status: acceptable and spontaneous ventilation  Hydration status: euvolemic  Multimodal analgesia pain management approach  Pain management: adequate    No notable events documented.

## 2024-01-18 LAB — SURGICAL PATHOLOGY REPORT: NORMAL

## 2024-01-26 ENCOUNTER — HOSPITAL ENCOUNTER (OUTPATIENT)
Dept: INTERVENTIONAL RADIOLOGY/VASCULAR | Age: 69
Discharge: HOME | End: 2024-01-26
Payer: COMMERCIAL

## 2024-01-26 VITALS
RESPIRATION RATE: 19 BRPM | OXYGEN SATURATION: 98 % | DIASTOLIC BLOOD PRESSURE: 61 MMHG | SYSTOLIC BLOOD PRESSURE: 129 MMHG | TEMPERATURE: 98.6 F | HEART RATE: 84 BPM

## 2024-01-26 DIAGNOSIS — R18.8 OTHER ASCITES: ICD-10-CM

## 2024-01-26 LAB
ALBUMIN FLD-MCNC: 1.4 G/DL
APPEARANCE FLD: CLEAR
BODY FLD TYPE: NORMAL
CLOT CHECK: NORMAL
COLOR FLD: YELLOW
GLUCOSE FLD-MCNC: 116 MG/DL
LDH FLD L TO P-CCNC: 99 U/L
MONOCYTES NFR FLD: 88 %
NEUTROPHILS NFR FLD: 12 %
PROT FLD-MCNC: 2.8 G/DL
RBC # FLD: 7000 CELLS/UL
SPECIMEN TYPE: NORMAL
WBC # FLD: 111 CELLS/UL

## 2024-01-26 PROCEDURE — C1729 CATH, DRAINAGE: HCPCS

## 2024-01-26 PROCEDURE — 87205 SMEAR GRAM STAIN: CPT

## 2024-01-26 PROCEDURE — 82945 GLUCOSE OTHER FLUID: CPT

## 2024-01-26 PROCEDURE — 84157 ASSAY OF PROTEIN OTHER: CPT

## 2024-01-26 PROCEDURE — 83615 LACTATE (LD) (LDH) ENZYME: CPT

## 2024-01-26 PROCEDURE — 89051 BODY FLUID CELL COUNT: CPT

## 2024-01-26 PROCEDURE — 82042 OTHER SOURCE ALBUMIN QUAN EA: CPT

## 2024-01-26 PROCEDURE — 2500000003 HC RX 250 WO HCPCS: Performed by: PHYSICIAN ASSISTANT

## 2024-01-26 PROCEDURE — 49083 ABD PARACENTESIS W/IMAGING: CPT

## 2024-01-26 PROCEDURE — 87070 CULTURE OTHR SPECIMN AEROBIC: CPT

## 2024-01-26 RX ORDER — LIDOCAINE HYDROCHLORIDE 20 MG/ML
INJECTION, SOLUTION INFILTRATION; PERINEURAL PRN
Status: COMPLETED | OUTPATIENT
Start: 2024-01-26 | End: 2024-01-26

## 2024-01-26 RX ADMIN — LIDOCAINE HYDROCHLORIDE 10 ML: 20 INJECTION, SOLUTION INFILTRATION; PERINEURAL at 08:50

## 2024-01-26 ASSESSMENT — PAIN - FUNCTIONAL ASSESSMENT
PAIN_FUNCTIONAL_ASSESSMENT: NONE - DENIES PAIN
PAIN_FUNCTIONAL_ASSESSMENT: NONE - DENIES PAIN

## 2024-01-26 NOTE — BRIEF OP NOTE
Brief-Op Note  ______________________________________________________________      IR U/S GUIDED PARACENTESIS  [facility] SPECIAL PROCEDURES    Patient Name: Shahzad Rodgers   YOB: 1955  Medical Record Number: [id number]  Date of Procedure: 1/26/24  Room/Bed: Room/bed info not found      Pre-operative Diagnosis: Ascites    Post-operative Diagnosis: Ascites    Consent: Informed consent was obtained from the patient prior to the procedure. The details of the procedure, as well is its risks, benefits, and alternatives, were explained.      Anesthesia: Local anesthesia.    Performed by: JUAN ALBERTO Morales under on-site supervision by Justin Rapp MD.    Estimated blood loss: Minimal    Complications: None    Specimen Obtained: Ascites fluid    (See radiology dictation in PACs for image review and additional procedural information)    Electronically signed by JUAN ALBERTO Morales   DD: 1/26/24  8:53 AM

## 2024-01-26 NOTE — OP NOTE
Operative Note  ______________________________________________________________      IR U/S GUIDED PARACENTESIS  [facility] SPECIAL PROCEDURES    Patient Name: Shahzad Rodgers   YOB: 1955  Medical Record Number: [id number]  Date of Procedure: 1/26/24  Room/Bed: Room/bed info not found    Pre-operative Diagnosis: Ascites    Post-operative Diagnosis: Ascites    Consent: Informed consent was obtained from the patient prior to the procedure. The details of the procedure, as well is its risks, benefits, and alternatives, were explained.      Anesthesia: Local anesthesia    Performed by: JUAN ALBERTO Morales under on-site supervision by Justin Rapp MD.    Estimated blood loss: Minimal    Complications: None    Specimens Obtained: Ascites Fluid    Procedure: Routine scanning of all four abdominal quadrants was performed using real-time ultrasound and revealed minimal amount of ascites fluid present.  Decision was made to proceed with procedure.  After obtaining consent, a \"Time-Out\" was called to verify the correct patient, procedure/location, allergies, relevant medications held for procedure and that all equipment is functioning and available. The patient was then placed in the supine position with the head of the bed slightly elevated and the appropriate landmarks were identified. The skin over the puncture site in the right upper quadrant region was prepped with betadine and draped in a sterile fashion. Local anesthesia was obtained by infiltration using 1% Lidocaine without epinephrine. A 5 Citizen of [place] needle sheath catheter was then advanced into the abdominal cavity. Fluid return was clear barb colored.  A total volume of  1175mL was withdrawn. The catheter was then withdrawn and a sterile dressing was placed over the site. The patient tolerated the procedure well. Complications: None. Estimated Blood Loss: < 10cc.   End of procedure note..       Electronically signed by JUAN ALBERTO Morales   DD:

## 2024-01-26 NOTE — PROGRESS NOTES
Patient arrived to radiology department via ambulation with cane with friend for an anticipated and scheduled paracentesis.    A thorough review of the patient's allergies, current home medications, and history & physical was conducted to ensure accurate medical history. Vital signs were measured and recorded, indicating a stable condition. See flowsheets for detailed documentation of vital signs.    Detailed procedural instructions were provided to the patient, ensuring they understood each step of the upcoming procedure. Ample opportunity was given for the patient or family to ask questions, to which satisfactory answers were provided. The patient demonstrated a clear understanding of the information.    Informed procedural consent was obtained by the performing provider in the presence of a witness, indicating their agreement to proceed with the procedure.    Throughout the interaction, the patient appeared calm and cooperative. The patient was reassured and made comfortable in preparation for the procedure. The nursing team remains ready to provide further assistance and answer any additional questions that may arise.

## 2024-01-26 NOTE — OR NURSING
sterile, woven gauze and clear transparent dressing applied to RLQ.    The dressing application commenced in a well-lit room with strict adherence to aseptic technique. Prior to dressing application, meticulous examination of the procedure site revealed no signs of hematoma or complications. The site was gently cleansed with sterile saline solution, ensuring the removal of any debris or exudate.    I provided detailed patient education regarding the dressing's care, emphasizing the importance of maintaining it clean and dry. The patient was informed about potential signs of infection, such as redness, increased pain, swelling, or discharge, and was instructed to promptly report any such symptoms.    The next dressing change and wound assessment should occur after 24hours

## 2024-01-26 NOTE — OR NURSING
Patient is displaying no signs or symptoms of complications. Throughout their stay, vital signs have been checked regularly and the procedure site has been monitored. The site remains clean, dry, and intact. Discharge instructions were thoroughly reviewed with the patient, and all their questions were answered satisfactorily. The patient is in possession of discharge papers and follow up information include contact information if needed. The patient confirms understanding and receipt of all necessary information. The patient was then escorted to outside of department via independent ambulation. At the time of discharge, the patient is in stable condition, exhibiting no signs or symptoms of complications. The patient states they are not operating a motor vehicle post-procedure and care has been transferred to a safe, responsible party for transport to private residence.

## 2024-01-26 NOTE — PROGRESS NOTES
Specimen retrieved during procedure was personally delivered to first floor critical care laboratory specimen receiving  and handed directly to the laboratory staff. Specimen receiving reference number: [id number]

## 2024-01-28 LAB
MICROORGANISM SPEC CULT: NORMAL
MICROORGANISM/AGENT SPEC: NORMAL
SPECIMEN DESCRIPTION: NORMAL

## 2024-02-14 ENCOUNTER — APPOINTMENT (OUTPATIENT)
Dept: CT IMAGING | Age: 69
DRG: 981 | End: 2024-02-14
Payer: COMMERCIAL

## 2024-02-14 ENCOUNTER — HOSPITAL ENCOUNTER (INPATIENT)
Age: 69
LOS: 15 days | Discharge: NURSING, REHAB OR LONG-TERM CARE | DRG: 981 | End: 2024-02-29
Attending: EMERGENCY MEDICINE | Admitting: INTERNAL MEDICINE
Payer: COMMERCIAL

## 2024-02-14 DIAGNOSIS — I49.3 VENTRICULAR ECTOPY: ICD-10-CM

## 2024-02-14 DIAGNOSIS — I50.20 HFREF (HEART FAILURE WITH REDUCED EJECTION FRACTION) (HCC): ICD-10-CM

## 2024-02-14 DIAGNOSIS — K92.2 GASTROINTESTINAL HEMORRHAGE, UNSPECIFIED GASTROINTESTINAL HEMORRHAGE TYPE: ICD-10-CM

## 2024-02-14 DIAGNOSIS — R07.9 CHEST PAIN, UNSPECIFIED TYPE: ICD-10-CM

## 2024-02-14 DIAGNOSIS — R29.6 MULTIPLE FALLS: Primary | ICD-10-CM

## 2024-02-14 DIAGNOSIS — R55 SYNCOPE, UNSPECIFIED SYNCOPE TYPE: ICD-10-CM

## 2024-02-14 DIAGNOSIS — I46.9 CARDIAC ARREST (HCC): ICD-10-CM

## 2024-02-14 DIAGNOSIS — K70.31 ALCOHOLIC CIRRHOSIS OF LIVER WITH ASCITES (HCC): ICD-10-CM

## 2024-02-14 DIAGNOSIS — I38 VALVULAR HEART DISEASE: ICD-10-CM

## 2024-02-14 DIAGNOSIS — Z87.19 HISTORY OF CIRRHOSIS: ICD-10-CM

## 2024-02-14 DIAGNOSIS — S09.90XA CLOSED HEAD INJURY, INITIAL ENCOUNTER: ICD-10-CM

## 2024-02-14 LAB
ALBUMIN SERPL-MCNC: 3.3 G/DL (ref 3.5–5.2)
ALBUMIN SERPL-MCNC: NORMAL G/DL (ref 3.5–5.2)
ALBUMIN/GLOB SERPL: NORMAL {RATIO} (ref 1–2.5)
ALP SERPL-CCNC: 261 U/L (ref 40–129)
ALP SERPL-CCNC: NORMAL U/L (ref 40–129)
ALT SERPL-CCNC: 52 U/L (ref 0–40)
ALT SERPL-CCNC: NORMAL U/L (ref 5–41)
AMMONIA PLAS-SCNC: 30 UMOL/L (ref 16–60)
AMMONIA PLAS-SCNC: NORMAL UMOL/L (ref 16–60)
ANION GAP SERPL CALCULATED.3IONS-SCNC: 19 MMOL/L (ref 7–16)
ANION GAP SERPL CALCULATED.3IONS-SCNC: NORMAL MMOL/L (ref 9–17)
AST SERPL-CCNC: 394 U/L (ref 0–39)
AST SERPL-CCNC: NORMAL U/L
BASOPHILS # BLD: 0.04 K/UL (ref 0–0.2)
BASOPHILS NFR BLD: 1 % (ref 0–2)
BILIRUB SERPL-MCNC: 6.2 MG/DL (ref 0–1.2)
BILIRUB SERPL-MCNC: NORMAL MG/DL (ref 0.3–1.2)
BILIRUB UR QL STRIP: ABNORMAL
BUN SERPL-MCNC: 13 MG/DL (ref 6–23)
BUN SERPL-MCNC: NORMAL MG/DL (ref 8–23)
BUN/CREAT SERPL: NORMAL (ref 9–20)
CALCIUM SERPL-MCNC: 7.9 MG/DL (ref 8.6–10.2)
CALCIUM SERPL-MCNC: NORMAL MG/DL (ref 8.6–10.4)
CHLORIDE SERPL-SCNC: 93 MMOL/L (ref 98–107)
CHLORIDE SERPL-SCNC: NORMAL MMOL/L (ref 98–107)
CLARITY UR: CLEAR
CO2 SERPL-SCNC: 27 MMOL/L (ref 22–29)
CO2 SERPL-SCNC: NORMAL MMOL/L (ref 20–31)
COLOR UR: YELLOW
CREAT SERPL-MCNC: 0.7 MG/DL (ref 0.7–1.2)
CREAT SERPL-MCNC: NORMAL MG/DL (ref 0.7–1.2)
EKG ATRIAL RATE: 75 BPM
EKG P AXIS: 135 DEGREES
EKG P-R INTERVAL: 150 MS
EKG Q-T INTERVAL: 434 MS
EKG QRS DURATION: 98 MS
EKG QTC CALCULATION (BAZETT): 484 MS
EKG R AXIS: 168 DEGREES
EKG T AXIS: 119 DEGREES
EKG VENTRICULAR RATE: 75 BPM
EOSINOPHIL # BLD: 0.02 K/UL (ref 0.05–0.5)
EOSINOPHILS RELATIVE PERCENT: 0 % (ref 0–6)
ERYTHROCYTE [DISTWIDTH] IN BLOOD BY AUTOMATED COUNT: 15.7 % (ref 11.5–15)
GFR SERPL CREATININE-BSD FRML MDRD: >60 ML/MIN/1.73M2
GFR SERPL CREATININE-BSD FRML MDRD: NORMAL ML/MIN/1.73M2
GLUCOSE SERPL-MCNC: 79 MG/DL (ref 74–99)
GLUCOSE SERPL-MCNC: NORMAL MG/DL (ref 70–99)
GLUCOSE UR STRIP-MCNC: NEGATIVE MG/DL
HCT VFR BLD AUTO: 34.4 % (ref 37–54)
HGB BLD-MCNC: 11.8 G/DL (ref 12.5–16.5)
HGB UR QL STRIP.AUTO: ABNORMAL
IMM GRANULOCYTES # BLD AUTO: 0.04 K/UL (ref 0–0.58)
IMM GRANULOCYTES NFR BLD: 1 % (ref 0–5)
INR PPP: 1.3
KETONES UR STRIP-MCNC: 15 MG/DL
LACTATE BLDV-SCNC: 4.5 MMOL/L (ref 0.5–2.2)
LACTATE BLDV-SCNC: 6.1 MMOL/L (ref 0.5–2.2)
LEUKOCYTE ESTERASE UR QL STRIP: NEGATIVE
LIPASE SERPL-CCNC: 60 U/L (ref 13–60)
LYMPHOCYTES NFR BLD: 0.92 K/UL (ref 1.5–4)
LYMPHOCYTES RELATIVE PERCENT: 14 % (ref 20–42)
MCH RBC QN AUTO: 32.7 PG (ref 26–35)
MCHC RBC AUTO-ENTMCNC: 34.3 G/DL (ref 32–34.5)
MCV RBC AUTO: 95.3 FL (ref 80–99.9)
MONOCYTES NFR BLD: 0.94 K/UL (ref 0.1–0.95)
MONOCYTES NFR BLD: 14 % (ref 2–12)
NEUTROPHILS NFR BLD: 71 % (ref 43–80)
NEUTS SEG NFR BLD: 4.79 K/UL (ref 1.8–7.3)
NITRITE UR QL STRIP: NEGATIVE
PARTIAL THROMBOPLASTIN TIME: 32.8 SEC (ref 24.5–35.1)
PH UR STRIP: 6.5 [PH] (ref 5–9)
PLATELET CONFIRMATION: NORMAL
PLATELET, FLUORESCENCE: 49 K/UL (ref 130–450)
PMV BLD AUTO: 11.9 FL (ref 7–12)
POTASSIUM SERPL-SCNC: 3 MMOL/L (ref 3.5–5)
POTASSIUM SERPL-SCNC: NORMAL MMOL/L (ref 3.7–5.3)
PROT SERPL-MCNC: 7.8 G/DL (ref 6.4–8.3)
PROT SERPL-MCNC: NORMAL G/DL (ref 6.4–8.3)
PROT UR STRIP-MCNC: NEGATIVE MG/DL
PROTHROMBIN TIME: 13.9 SEC (ref 9.3–12.4)
RBC # BLD AUTO: 3.61 M/UL (ref 3.8–5.8)
RBC #/AREA URNS HPF: ABNORMAL /HPF
SODIUM SERPL-SCNC: 139 MMOL/L (ref 132–146)
SODIUM SERPL-SCNC: NORMAL MMOL/L (ref 135–144)
SP GR UR STRIP: 1.01 (ref 1–1.03)
TROPONIN I SERPL HS-MCNC: 17 NG/L (ref 0–11)
TROPONIN I SERPL HS-MCNC: 17 NG/L (ref 0–11)
TROPONIN I SERPL HS-MCNC: NORMAL NG/L (ref 0–22)
TROPONIN INTERP: NORMAL
TROPONIN T SERPL-MCNC: NORMAL NG/ML
UROBILINOGEN UR STRIP-ACNC: >8 EU/DL (ref 0–1)
WBC #/AREA URNS HPF: ABNORMAL /HPF
WBC OTHER # BLD: 6.8 K/UL (ref 4.5–11.5)

## 2024-02-14 PROCEDURE — 36415 COLL VENOUS BLD VENIPUNCTURE: CPT

## 2024-02-14 PROCEDURE — 70450 CT HEAD/BRAIN W/O DYE: CPT

## 2024-02-14 PROCEDURE — 86901 BLOOD TYPING SEROLOGIC RH(D): CPT

## 2024-02-14 PROCEDURE — 72128 CT CHEST SPINE W/O DYE: CPT

## 2024-02-14 PROCEDURE — 84484 ASSAY OF TROPONIN QUANT: CPT

## 2024-02-14 PROCEDURE — 82140 ASSAY OF AMMONIA: CPT

## 2024-02-14 PROCEDURE — 2580000003 HC RX 258: Performed by: INTERNAL MEDICINE

## 2024-02-14 PROCEDURE — 2580000003 HC RX 258

## 2024-02-14 PROCEDURE — 6360000004 HC RX CONTRAST MEDICATION: Performed by: RADIOLOGY

## 2024-02-14 PROCEDURE — 86900 BLOOD TYPING SEROLOGIC ABO: CPT

## 2024-02-14 PROCEDURE — 85610 PROTHROMBIN TIME: CPT

## 2024-02-14 PROCEDURE — 6370000000 HC RX 637 (ALT 250 FOR IP): Performed by: INTERNAL MEDICINE

## 2024-02-14 PROCEDURE — 6370000000 HC RX 637 (ALT 250 FOR IP)

## 2024-02-14 PROCEDURE — 83605 ASSAY OF LACTIC ACID: CPT

## 2024-02-14 PROCEDURE — P9016 RBC LEUKOCYTES REDUCED: HCPCS

## 2024-02-14 PROCEDURE — 74177 CT ABD & PELVIS W/CONTRAST: CPT

## 2024-02-14 PROCEDURE — 85730 THROMBOPLASTIN TIME PARTIAL: CPT

## 2024-02-14 PROCEDURE — 86923 COMPATIBILITY TEST ELECTRIC: CPT

## 2024-02-14 PROCEDURE — 1200000000 HC SEMI PRIVATE

## 2024-02-14 PROCEDURE — 99285 EMERGENCY DEPT VISIT HI MDM: CPT

## 2024-02-14 PROCEDURE — 72125 CT NECK SPINE W/O DYE: CPT

## 2024-02-14 PROCEDURE — 86850 RBC ANTIBODY SCREEN: CPT

## 2024-02-14 PROCEDURE — 85025 COMPLETE CBC W/AUTO DIFF WBC: CPT

## 2024-02-14 PROCEDURE — 83690 ASSAY OF LIPASE: CPT

## 2024-02-14 PROCEDURE — 81001 URINALYSIS AUTO W/SCOPE: CPT

## 2024-02-14 PROCEDURE — 99223 1ST HOSP IP/OBS HIGH 75: CPT | Performed by: INTERNAL MEDICINE

## 2024-02-14 PROCEDURE — 93010 ELECTROCARDIOGRAM REPORT: CPT | Performed by: INTERNAL MEDICINE

## 2024-02-14 PROCEDURE — 72131 CT LUMBAR SPINE W/O DYE: CPT

## 2024-02-14 PROCEDURE — 93005 ELECTROCARDIOGRAM TRACING: CPT

## 2024-02-14 PROCEDURE — 80053 COMPREHEN METABOLIC PANEL: CPT

## 2024-02-14 PROCEDURE — 71260 CT THORAX DX C+: CPT

## 2024-02-14 RX ORDER — ACETAMINOPHEN 325 MG/1
650 TABLET ORAL EVERY 6 HOURS PRN
Status: DISCONTINUED | OUTPATIENT
Start: 2024-02-14 | End: 2024-02-23 | Stop reason: SDUPTHER

## 2024-02-14 RX ORDER — POTASSIUM CHLORIDE 20 MEQ/1
40 TABLET, EXTENDED RELEASE ORAL PRN
Status: DISCONTINUED | OUTPATIENT
Start: 2024-02-14 | End: 2024-02-17

## 2024-02-14 RX ORDER — SODIUM CHLORIDE 9 MG/ML
INJECTION, SOLUTION INTRAVENOUS PRN
Status: DISCONTINUED | OUTPATIENT
Start: 2024-02-14 | End: 2024-02-29 | Stop reason: HOSPADM

## 2024-02-14 RX ORDER — ACETAMINOPHEN 650 MG/1
650 SUPPOSITORY RECTAL EVERY 6 HOURS PRN
Status: DISCONTINUED | OUTPATIENT
Start: 2024-02-14 | End: 2024-02-23 | Stop reason: SDUPTHER

## 2024-02-14 RX ORDER — POTASSIUM CHLORIDE 20 MEQ/1
20 TABLET, EXTENDED RELEASE ORAL DAILY
Status: DISCONTINUED | OUTPATIENT
Start: 2024-02-14 | End: 2024-02-16

## 2024-02-14 RX ORDER — MAGNESIUM SULFATE IN WATER 40 MG/ML
2000 INJECTION, SOLUTION INTRAVENOUS PRN
Status: DISCONTINUED | OUTPATIENT
Start: 2024-02-14 | End: 2024-02-29 | Stop reason: HOSPADM

## 2024-02-14 RX ORDER — POLYETHYLENE GLYCOL 3350 17 G/17G
17 POWDER, FOR SOLUTION ORAL DAILY PRN
Status: DISCONTINUED | OUTPATIENT
Start: 2024-02-14 | End: 2024-02-29 | Stop reason: HOSPADM

## 2024-02-14 RX ORDER — ONDANSETRON 2 MG/ML
4 INJECTION INTRAMUSCULAR; INTRAVENOUS EVERY 6 HOURS PRN
Status: DISCONTINUED | OUTPATIENT
Start: 2024-02-14 | End: 2024-02-21

## 2024-02-14 RX ORDER — SODIUM CHLORIDE 0.9 % (FLUSH) 0.9 %
5-40 SYRINGE (ML) INJECTION PRN
Status: DISCONTINUED | OUTPATIENT
Start: 2024-02-14 | End: 2024-02-29 | Stop reason: HOSPADM

## 2024-02-14 RX ORDER — POTASSIUM CHLORIDE 7.45 MG/ML
10 INJECTION INTRAVENOUS PRN
Status: DISCONTINUED | OUTPATIENT
Start: 2024-02-14 | End: 2024-02-17

## 2024-02-14 RX ORDER — NADOLOL 20 MG/1
20 TABLET ORAL DAILY
Status: DISCONTINUED | OUTPATIENT
Start: 2024-02-14 | End: 2024-02-28

## 2024-02-14 RX ORDER — PANTOPRAZOLE SODIUM 40 MG/1
40 TABLET, DELAYED RELEASE ORAL
Status: DISCONTINUED | OUTPATIENT
Start: 2024-02-15 | End: 2024-02-17

## 2024-02-14 RX ORDER — FUROSEMIDE 40 MG/1
40 TABLET ORAL 2 TIMES DAILY
Status: DISCONTINUED | OUTPATIENT
Start: 2024-02-14 | End: 2024-02-27

## 2024-02-14 RX ORDER — ONDANSETRON 4 MG/1
4 TABLET, ORALLY DISINTEGRATING ORAL EVERY 8 HOURS PRN
Status: DISCONTINUED | OUTPATIENT
Start: 2024-02-14 | End: 2024-02-21

## 2024-02-14 RX ORDER — 0.9 % SODIUM CHLORIDE 0.9 %
500 INTRAVENOUS SOLUTION INTRAVENOUS ONCE
Status: COMPLETED | OUTPATIENT
Start: 2024-02-14 | End: 2024-02-14

## 2024-02-14 RX ORDER — SODIUM CHLORIDE 0.9 % (FLUSH) 0.9 %
5-40 SYRINGE (ML) INJECTION EVERY 12 HOURS SCHEDULED
Status: DISCONTINUED | OUTPATIENT
Start: 2024-02-14 | End: 2024-02-29 | Stop reason: HOSPADM

## 2024-02-14 RX ADMIN — FUROSEMIDE 40 MG: 40 TABLET ORAL at 20:30

## 2024-02-14 RX ADMIN — NADOLOL 20 MG: 20 TABLET ORAL at 20:36

## 2024-02-14 RX ADMIN — POTASSIUM BICARBONATE 40 MEQ: 782 TABLET, EFFERVESCENT ORAL at 14:46

## 2024-02-14 RX ADMIN — SODIUM CHLORIDE 500 ML: 9 INJECTION, SOLUTION INTRAVENOUS at 10:55

## 2024-02-14 RX ADMIN — SODIUM CHLORIDE 500 ML: 9 INJECTION, SOLUTION INTRAVENOUS at 14:26

## 2024-02-14 RX ADMIN — POTASSIUM CHLORIDE 20 MEQ: 1500 TABLET, EXTENDED RELEASE ORAL at 20:30

## 2024-02-14 RX ADMIN — IOPAMIDOL 75 ML: 755 INJECTION, SOLUTION INTRAVENOUS at 13:34

## 2024-02-14 RX ADMIN — Medication 10 ML: at 20:31

## 2024-02-14 ASSESSMENT — PAIN - FUNCTIONAL ASSESSMENT: PAIN_FUNCTIONAL_ASSESSMENT: NONE - DENIES PAIN

## 2024-02-14 NOTE — PROGRESS NOTES
Shahzad Rodgers Jr. (:  1955) is a 68 y.o. male, here for evaluation of the following chief complaint(s):  Cirrhosis and Follow-up (Labs and scopes )      SUBJECTIVE/OBJECTIVE:  HPI:     Shahzad is a very pleasant 68 year old gentleman that presents today for follow up on EGD for variceal screening.  Patient has alcoholic cirrhosis    The patient is accompanied by Benito.    Impression: Grade II esophageal varices with no associated high risk features. Z-line regular. Portal hypertensive gastropathy with focal areas of low-grade active hemorrhage. Treated with bipolar cautery. Normal antrum. Normal examined duodenum. No specimens collected.     Impression: The examined portion of the ileum was normal. Congestive colopathy in the entire examined colon. Tortuous sigmoid. Three polyps removed with a cold snare or biopsy forceps. Resected and retrieved. Internal hemorrhoids.     A.          Colon Polyp x 3:         Fragments of tubular adenoma(s) and a hyperplastic polyp.     Patient has missed 2 weeks of paracentesis  Patient is moving his bowels daily and are loose   Sleeps at home most of the day, complains of frequent falls  Has urine all over his pants  Patient tells me that he is very weak and is notable SOB  BP 80/60, unable to obtain pulse ox        ROS:  General: Patient denies n/v/f/c or weight loss.  HEENT: Patient denies persistent postnasal drip, scleral icterus, drooling, persistent bleeding from nose/mouth.  Resp: Patient denies SOB, wheezing, productive cough.  Cards: Patient denies CP, palpitations, significant edema  GI: As above.  Derm: Patient denies jaundice/rashes.   Musc: Patient denies diffuse/irregular joint swelling or myalgias.      Objective   Wt Readings from Last 3 Encounters:   24 82.3 kg (181 lb 7 oz)   24 82.3 kg (181 lb 6.4 oz)   24 72.6 kg (160 lb)     Temp Readings from Last 3 Encounters:   24 97.2 °F (36.2 °C) (Rectal)   24 97.8 °F (36.6 °C)

## 2024-02-14 NOTE — ED PROVIDER NOTES
[facility] EMERGENCY DEPARTMENT  EMERGENCY DEPARTMENT ENCOUNTER        Pt Name: Shahzad Rodgers  MRN: [id number]  Birthdate 1955  Date of evaluation: 2/14/2024  Provider: Alberto Carvajal DO  PCP: Fly Sequeira MD  Note Started: 10:27 AM EST 2/14/24    CHIEF COMPLAINT       Chief Complaint   Patient presents with    Cirrhosis    Fatigue     Patient sent from PCP for increased confusion , hypotension , Jaundice , and possible paracentesis . Patient has hx of alcoholic cirrhosis         HISTORY OF PRESENT ILLNESS: 1 or more Elements   History From: patient    Limitations to history : None    Shahzad Rodgers is a 68 y.o. male who presents to the emergency department for generalized fatigue that started a week ago.  Was reported that the patient was at the doctor's office for GI with a history of cirrhosis when his blood pressure went low and he syncopized.  Patient reports that he has had some intermittent falls at home, approximately 1/day with scattered bruises over his body.  He reports just generalized fatigue.  He reports that he had colonoscopy and endoscopy performed 2 weeks ago that was unremarkable.  He reports that he sometimes feels like he is going to pass out before he does pass out. Patient denies fever, chills, headache, shortness of breath, chest pain, abdominal pain, nausea, vomiting, diarrhea, dysuria, hematuria, hematochezia, and melena.  Quit drinking in January.     Nursing Notes were all reviewed and agreed with or any disagreements were addressed in the HPI.        REVIEW OF EXTERNAL NOTES :      GI note 2/14/2014:  Impression: Grade II esophageal varices with no associated high risk features. Z-line regular. Portal hypertensive gastropathy with focal areas of low-grade active hemorrhage. Treated with bipolar cautery. Normal antrum. Normal examined duodenum. No specimens collected.      REVIEW OF SYSTEMS :           Positives and Pertinent negatives as

## 2024-02-15 ENCOUNTER — APPOINTMENT (OUTPATIENT)
Age: 69
DRG: 981 | End: 2024-02-15
Attending: INTERNAL MEDICINE
Payer: COMMERCIAL

## 2024-02-15 ENCOUNTER — APPOINTMENT (OUTPATIENT)
Dept: CT IMAGING | Age: 69
DRG: 981 | End: 2024-02-15
Payer: COMMERCIAL

## 2024-02-15 LAB
ABO/RH: NORMAL
ANION GAP SERPL CALCULATED.3IONS-SCNC: 20 MMOL/L (ref 7–16)
ANTIBODY SCREEN: NEGATIVE
ARM BAND NUMBER: NORMAL
BASOPHILS # BLD: 0.04 K/UL (ref 0–0.2)
BASOPHILS NFR BLD: 1 % (ref 0–2)
BLOOD BANK BLOOD PRODUCT EXPIRATION DATE: NORMAL
BLOOD BANK DISPENSE STATUS: NORMAL
BLOOD BANK ISBT PRODUCT BLOOD TYPE: 7300
BLOOD BANK PRODUCT CODE: NORMAL
BLOOD BANK SAMPLE EXPIRATION: NORMAL
BLOOD BANK UNIT TYPE AND RH: NORMAL
BPU ID: NORMAL
BUN SERPL-MCNC: 11 MG/DL (ref 6–23)
CALCIUM SERPL-MCNC: 7.4 MG/DL (ref 8.6–10.2)
CHLORIDE SERPL-SCNC: 92 MMOL/L (ref 98–107)
CO2 SERPL-SCNC: 25 MMOL/L (ref 22–29)
COMPONENT: NORMAL
CREAT SERPL-MCNC: 0.7 MG/DL (ref 0.7–1.2)
CROSSMATCH RESULT: NORMAL
DATE, STOOL #1: ABNORMAL
ECHO AO ASC DIAM: 2.7 CM
ECHO AO ASCENDING AORTA INDEX: 1.41 CM/M2
ECHO AV AREA PEAK VELOCITY: 2.3 CM2
ECHO AV AREA VTI: 2.6 CM2
ECHO AV AREA/BSA PEAK VELOCITY: 1.2 CM2/M2
ECHO AV AREA/BSA VTI: 1.4 CM2/M2
ECHO AV CUSP MM: 1.7 CM
ECHO AV MEAN GRADIENT: 7 MMHG
ECHO AV MEAN VELOCITY: 1.3 M/S
ECHO AV PEAK GRADIENT: 14 MMHG
ECHO AV PEAK VELOCITY: 1.9 M/S
ECHO AV VELOCITY RATIO: 0.74
ECHO AV VTI: 38.5 CM
ECHO BSA: 1.91 M2
ECHO EST RA PRESSURE: 3 MMHG
ECHO LA DIAMETER INDEX: 1.72 CM/M2
ECHO LA DIAMETER: 3.3 CM
ECHO LA VOL A-L A2C: 58 ML (ref 18–58)
ECHO LA VOL A-L A4C: 54 ML (ref 18–58)
ECHO LA VOL MOD A2C: 55 ML (ref 18–58)
ECHO LA VOL MOD A4C: 50 ML (ref 18–58)
ECHO LA VOLUME AREA LENGTH: 59 ML
ECHO LA VOLUME INDEX A-L A2C: 30 ML/M2 (ref 16–34)
ECHO LA VOLUME INDEX A-L A4C: 28 ML/M2 (ref 16–34)
ECHO LA VOLUME INDEX AREA LENGTH: 31 ML/M2 (ref 16–34)
ECHO LA VOLUME INDEX MOD A2C: 29 ML/M2 (ref 16–34)
ECHO LA VOLUME INDEX MOD A4C: 26 ML/M2 (ref 16–34)
ECHO LV FRACTIONAL SHORTENING: 29 % (ref 28–44)
ECHO LV INTERNAL DIMENSION DIASTOLE INDEX: 2.66 CM/M2
ECHO LV INTERNAL DIMENSION DIASTOLIC: 5.1 CM (ref 4.2–5.9)
ECHO LV INTERNAL DIMENSION SYSTOLIC INDEX: 1.88 CM/M2
ECHO LV INTERNAL DIMENSION SYSTOLIC: 3.6 CM
ECHO LV IVSD: 0.8 CM (ref 0.6–1)
ECHO LV IVSS: 0.9 CM
ECHO LV MASS 2D: 151.8 G (ref 88–224)
ECHO LV MASS INDEX 2D: 79.1 G/M2 (ref 49–115)
ECHO LV POSTERIOR WALL DIASTOLIC: 0.9 CM (ref 0.6–1)
ECHO LV POSTERIOR WALL SYSTOLIC: 1.1 CM
ECHO LV RELATIVE WALL THICKNESS RATIO: 0.35
ECHO LVOT AREA: 3.1 CM2
ECHO LVOT AV VTI INDEX: 0.82
ECHO LVOT DIAM: 2 CM
ECHO LVOT MEAN GRADIENT: 5 MMHG
ECHO LVOT PEAK GRADIENT: 8 MMHG
ECHO LVOT PEAK VELOCITY: 1.4 M/S
ECHO LVOT STROKE VOLUME INDEX: 51.4 ML/M2
ECHO LVOT SV: 98.6 ML
ECHO LVOT VTI: 31.4 CM
ECHO MV "A" WAVE DURATION: 128 MSEC
ECHO MV A VELOCITY: 1.09 M/S
ECHO MV AREA PHT: 1.8 CM2
ECHO MV AREA VTI: 2.4 CM2
ECHO MV E DECELERATION TIME (DT): 396.1 MS
ECHO MV E VELOCITY: 0.94 M/S
ECHO MV E/A RATIO: 0.86
ECHO MV LVOT VTI INDEX: 1.28
ECHO MV MAX VELOCITY: 1.3 M/S
ECHO MV MEAN GRADIENT: 3 MMHG
ECHO MV MEAN VELOCITY: 0.8 M/S
ECHO MV PEAK GRADIENT: 7 MMHG
ECHO MV PRESSURE HALF TIME (PHT): 123.2 MS
ECHO MV VTI: 40.3 CM
ECHO PULMONARY ARTERY SYSTOLIC PRESSURE (PASP): 31 MMHG
ECHO PV MAX VELOCITY: 1.1 M/S
ECHO PV MEAN GRADIENT: 3 MMHG
ECHO PV MEAN VELOCITY: 0.8 M/S
ECHO PV PEAK GRADIENT: 5 MMHG
ECHO PV VTI: 20.2 CM
ECHO PVEIN A DURATION: 114.2 MS
ECHO PVEIN A VELOCITY: 0.4 M/S
ECHO PVEIN PEAK D VELOCITY: 0.5 M/S
ECHO PVEIN PEAK S VELOCITY: 0.7 M/S
ECHO PVEIN S/D RATIO: 1.4
ECHO RIGHT VENTRICULAR SYSTOLIC PRESSURE (RVSP): 31 MMHG
ECHO RV INTERNAL DIMENSION: 3.1 CM
ECHO TV REGURGITANT MAX VELOCITY: 2.65 M/S
ECHO TV REGURGITANT PEAK GRADIENT: 28 MMHG
EOSINOPHIL # BLD: 0.04 K/UL (ref 0.05–0.5)
EOSINOPHILS RELATIVE PERCENT: 1 % (ref 0–6)
ERYTHROCYTE [DISTWIDTH] IN BLOOD BY AUTOMATED COUNT: 15.9 % (ref 11.5–15)
GFR SERPL CREATININE-BSD FRML MDRD: >60 ML/MIN/1.73M2
GLUCOSE BLD-MCNC: 89 MG/DL (ref 74–99)
GLUCOSE SERPL-MCNC: 82 MG/DL (ref 74–99)
HCT VFR BLD AUTO: 32.3 % (ref 37–54)
HEMOCCULT SP1 STL QL: POSITIVE
HGB BLD-MCNC: 11.2 G/DL (ref 12.5–16.5)
IMM GRANULOCYTES # BLD AUTO: 0.03 K/UL (ref 0–0.58)
IMM GRANULOCYTES NFR BLD: 1 % (ref 0–5)
INR PPP: 1.4
LYMPHOCYTES NFR BLD: 0.77 K/UL (ref 1.5–4)
LYMPHOCYTES RELATIVE PERCENT: 15 % (ref 20–42)
MAGNESIUM SERPL-MCNC: 1.4 MG/DL (ref 1.6–2.6)
MCH RBC QN AUTO: 32.6 PG (ref 26–35)
MCHC RBC AUTO-ENTMCNC: 34.7 G/DL (ref 32–34.5)
MCV RBC AUTO: 93.9 FL (ref 80–99.9)
MONOCYTES NFR BLD: 0.5 K/UL (ref 0.1–0.95)
MONOCYTES NFR BLD: 10 % (ref 2–12)
NEUTROPHILS NFR BLD: 73 % (ref 43–80)
NEUTS SEG NFR BLD: 3.74 K/UL (ref 1.8–7.3)
PLATELET CONFIRMATION: NORMAL
PLATELET, FLUORESCENCE: 35 K/UL (ref 130–450)
PMV BLD AUTO: 11.4 FL (ref 7–12)
POTASSIUM SERPL-SCNC: 3.1 MMOL/L (ref 3.5–5)
PROTHROMBIN TIME: 15 SEC (ref 9.3–12.4)
PSA SERPL-MCNC: <0.01 NG/ML (ref 0–4)
PSA SERPL-MCNC: <0.01 NG/ML (ref 0–4)
RBC # BLD AUTO: 3.44 M/UL (ref 3.8–5.8)
SODIUM SERPL-SCNC: 137 MMOL/L (ref 132–146)
TIME, STOOL #1: ABNORMAL
TRANSFUSION STATUS: NORMAL
UNIT DIVISION: 0
UNIT ISSUE DATE/TIME: NORMAL
WBC OTHER # BLD: 5.1 K/UL (ref 4.5–11.5)

## 2024-02-15 PROCEDURE — 6370000000 HC RX 637 (ALT 250 FOR IP): Performed by: INTERNAL MEDICINE

## 2024-02-15 PROCEDURE — 80048 BASIC METABOLIC PNL TOTAL CA: CPT

## 2024-02-15 PROCEDURE — 83735 ASSAY OF MAGNESIUM: CPT

## 2024-02-15 PROCEDURE — G0103 PSA SCREENING: HCPCS

## 2024-02-15 PROCEDURE — 93306 TTE W/DOPPLER COMPLETE: CPT | Performed by: INTERNAL MEDICINE

## 2024-02-15 PROCEDURE — 6360000002 HC RX W HCPCS

## 2024-02-15 PROCEDURE — 93306 TTE W/DOPPLER COMPLETE: CPT

## 2024-02-15 PROCEDURE — 85025 COMPLETE CBC W/AUTO DIFF WBC: CPT

## 2024-02-15 PROCEDURE — 36415 COLL VENOUS BLD VENIPUNCTURE: CPT

## 2024-02-15 PROCEDURE — 70450 CT HEAD/BRAIN W/O DYE: CPT

## 2024-02-15 PROCEDURE — 1200000000 HC SEMI PRIVATE

## 2024-02-15 PROCEDURE — 2580000003 HC RX 258: Performed by: INTERNAL MEDICINE

## 2024-02-15 PROCEDURE — 85610 PROTHROMBIN TIME: CPT

## 2024-02-15 PROCEDURE — 82270 OCCULT BLOOD FECES: CPT

## 2024-02-15 PROCEDURE — 99232 SBSQ HOSP IP/OBS MODERATE 35: CPT | Performed by: INTERNAL MEDICINE

## 2024-02-15 PROCEDURE — 82962 GLUCOSE BLOOD TEST: CPT

## 2024-02-15 RX ORDER — LORAZEPAM 2 MG/ML
1 INJECTION INTRAMUSCULAR ONCE
Status: COMPLETED | OUTPATIENT
Start: 2024-02-15 | End: 2024-02-15

## 2024-02-15 RX ADMIN — FUROSEMIDE 40 MG: 40 TABLET ORAL at 08:29

## 2024-02-15 RX ADMIN — Medication 10 ML: at 20:22

## 2024-02-15 RX ADMIN — PANTOPRAZOLE SODIUM 40 MG: 40 TABLET, DELAYED RELEASE ORAL at 17:51

## 2024-02-15 RX ADMIN — POTASSIUM CHLORIDE 20 MEQ: 1500 TABLET, EXTENDED RELEASE ORAL at 08:29

## 2024-02-15 RX ADMIN — NADOLOL 20 MG: 20 TABLET ORAL at 08:29

## 2024-02-15 RX ADMIN — LORAZEPAM 1 MG: 2 INJECTION INTRAMUSCULAR; INTRAVENOUS at 14:54

## 2024-02-15 RX ADMIN — PANTOPRAZOLE SODIUM 40 MG: 40 TABLET, DELAYED RELEASE ORAL at 06:06

## 2024-02-15 RX ADMIN — Medication 10 ML: at 08:29

## 2024-02-15 RX ADMIN — FUROSEMIDE 40 MG: 40 TABLET ORAL at 17:51

## 2024-02-15 NOTE — ACP (ADVANCE CARE PLANNING)
Advance Care Planning   The patient has the following advanced directives on file:  Advance Directives       Power of  Living Will ACP-Advance Directive ACP-Power of     Not on File Not on File Not on File Not on File            The patient has appointed the following active healthcare agents:    Primary Decision Maker: susanmigdalia - Brother/Sister - [phone]    Secondary Decision Maker: EloyBenito - Michael - [phone]    The Patient has the following current code status:    Code Status: Full Code    Visit Documentation:  Brother is legal next of kin.  Patient states he has no living will or DPOA and not interested at this time.     Isaiah Iqabl, SAMARIA  2/15/2024

## 2024-02-15 NOTE — H&P
[facility] History and Physical      CHIEF COMPLAINT: Recurrent fall at home.    History of Present Illness: This is 68-year-old male with past medical history of alcoholic liver cirrhosis, stage II esophageal varices, gastropathy, history of polypectomy, internal hemorrhoids from home due to recurrent fall and passed out.  Patient lived himself alone and he had been following and last time he fell yesterday and he himself stand up after starting.  And call EMS to come here.  In ER workup his hemoglobin found downtrend and patient transfused 1 unit PRBC.  He did not see passing blood through his vomiting or any bright red blood or black stool.  Mary recently did endoscopy and colonoscopy.  He had 3 polyps in the colon and resected.  Does not have any shortness of breath or chest pain.  His vitals normal, blood chemistry shows potassium 3.0 and lactic acid 4.5.  His alk phos is 261, ALT 52 and .  His hemoglobin 11.8 and urine examination shows hematuria.  CT chest shows a Small left pleural effusion and mild cardiomegaly.    Informant(s) for H&P: Patient    REVIEW OF SYSTEMS:  A comprehensive review of systems was negative except for: what is in the HPI      PMH:  Past Medical History:   Diagnosis Date    Alcoholism (HCC)     Prostate cancer (HCC)        Surgical History:  Past Surgical History:   Procedure Laterality Date    COLONOSCOPY N/A 01/16/2024    COLORECTAL CANCER SCREENING, NOT HIGH RISK performed by Leroy Hernandez MD at [facility] ENDOSCOPY    PARACENTESIS Right 09/22/2023    6200mL clear yellow fluid removed    PARACENTESIS Right 10/06/2023    4850 mL drained clear yellow    PARACENTESIS Right 01/26/2024    1175mL barb fluid removed. [id number] kit    PROSTATECTOMY  2006    UPPER GASTROINTESTINAL ENDOSCOPY N/A 01/16/2024    EGD ESOPHAGOGASTRODUODENOSCOPY performed by Leroy Hernandez MD at [facility] ENDOSCOPY       Medications Prior to Admission:    Prior to Admission medications

## 2024-02-15 NOTE — CARE COORDINATION
Care Coordination    68-year-old male with past medical history of alcoholic liver cirrhosis, esophageal varices, alcohol abuse who came in for a fall.  Reports frequent falls in last weeks. Is followed in the community for periodic paracentesis.  Follows with Dr. Hernandez.  Found  hemoglobin is down trended and he was transfused 1 unit of packed RBC.  Echo ordered.  Consult to urology  and GI. .  PT OT ordered.  SW met with patient at bedside to assess for discharge plan.  He lives alone in a duplex apartment with bed and bath on second floor.  He does not drive.  He uses a cane.  His friend Benito or his brother assist with transportation.  He reports increased weakness and falls . PCP is Dr. Fly Sequeira. No hx of [facility] or [facility].   SW will follow.  Limited support, multiple medical issues and indications of functional decline are risk for need for possible home care services or placement.  Will follow

## 2024-02-16 ENCOUNTER — APPOINTMENT (OUTPATIENT)
Dept: CT IMAGING | Age: 69
DRG: 981 | End: 2024-02-16
Payer: COMMERCIAL

## 2024-02-16 ENCOUNTER — APPOINTMENT (OUTPATIENT)
Age: 69
DRG: 981 | End: 2024-02-16
Payer: COMMERCIAL

## 2024-02-16 ENCOUNTER — APPOINTMENT (OUTPATIENT)
Dept: GENERAL RADIOLOGY | Age: 69
DRG: 981 | End: 2024-02-16
Payer: COMMERCIAL

## 2024-02-16 PROBLEM — I46.9 CARDIAC ARREST (HCC): Status: ACTIVE | Noted: 2024-02-16

## 2024-02-16 PROBLEM — R29.6 MULTIPLE FALLS: Status: ACTIVE | Noted: 2024-02-16

## 2024-02-16 LAB
ALBUMIN SERPL-MCNC: 3.2 G/DL (ref 3.5–5.2)
ALP SERPL-CCNC: 261 U/L (ref 40–129)
ALT SERPL-CCNC: 47 U/L (ref 0–40)
ANION GAP SERPL CALCULATED.3IONS-SCNC: 16 MMOL/L (ref 7–16)
ANION GAP SERPL CALCULATED.3IONS-SCNC: 24 MMOL/L (ref 7–16)
ANION GAP SERPL CALCULATED.3IONS-SCNC: NORMAL MMOL/L (ref 9–17)
AST SERPL-CCNC: 381 U/L (ref 0–39)
B.E.: -4.4 MMOL/L (ref -3–3)
BASOPHILS # BLD: 0.03 K/UL (ref 0–0.2)
BASOPHILS NFR BLD: 0 % (ref 0–2)
BILIRUB SERPL-MCNC: 8.2 MG/DL (ref 0–1.2)
BUN SERPL-MCNC: 17 MG/DL (ref 6–23)
BUN SERPL-MCNC: 23 MG/DL (ref 6–23)
BUN SERPL-MCNC: NORMAL MG/DL (ref 8–23)
BUN/CREAT SERPL: NORMAL (ref 9–20)
CALCIUM SERPL-MCNC: 7.1 MG/DL (ref 8.6–10.2)
CALCIUM SERPL-MCNC: 7.7 MG/DL (ref 8.6–10.2)
CALCIUM SERPL-MCNC: NORMAL MG/DL (ref 8.6–10.4)
CHLORIDE SERPL-SCNC: 85 MMOL/L (ref 98–107)
CHLORIDE SERPL-SCNC: 90 MMOL/L (ref 98–107)
CHLORIDE SERPL-SCNC: NORMAL MMOL/L (ref 98–107)
CO2 SERPL-SCNC: 24 MMOL/L (ref 22–29)
CO2 SERPL-SCNC: 26 MMOL/L (ref 22–29)
CO2 SERPL-SCNC: NORMAL MMOL/L (ref 20–31)
COHB: 1.5 % (ref 0–1.5)
CREAT SERPL-MCNC: 0.8 MG/DL (ref 0.7–1.2)
CREAT SERPL-MCNC: 1 MG/DL (ref 0.7–1.2)
CREAT SERPL-MCNC: NORMAL MG/DL (ref 0.7–1.2)
CRITICAL: ABNORMAL
DATE ANALYZED: ABNORMAL
DATE OF COLLECTION: ABNORMAL
ECHO BSA: 1.91 M2
ECHO EST RA PRESSURE: 3 MMHG
ECHO LA DIAMETER INDEX: 1.77 CM/M2
ECHO LA DIAMETER: 3.4 CM
ECHO LA VOL A-L A2C: 52 ML (ref 18–58)
ECHO LA VOL A-L A4C: 65 ML (ref 18–58)
ECHO LA VOL MOD A2C: 50 ML (ref 18–58)
ECHO LA VOL MOD A4C: 58 ML (ref 18–58)
ECHO LA VOLUME AREA LENGTH: 61 ML
ECHO LA VOLUME INDEX A-L A2C: 27 ML/M2 (ref 16–34)
ECHO LA VOLUME INDEX A-L A4C: 34 ML/M2 (ref 16–34)
ECHO LA VOLUME INDEX AREA LENGTH: 32 ML/M2 (ref 16–34)
ECHO LA VOLUME INDEX MOD A2C: 26 ML/M2 (ref 16–34)
ECHO LA VOLUME INDEX MOD A4C: 30 ML/M2 (ref 16–34)
ECHO LV EJECTION FRACTION A2C: 23 %
ECHO LV EJECTION FRACTION A4C: 19 %
ECHO LV FRACTIONAL SHORTENING: 10 % (ref 28–44)
ECHO LV INTERNAL DIMENSION DIASTOLE INDEX: 2.71 CM/M2
ECHO LV INTERNAL DIMENSION DIASTOLIC: 5.2 CM (ref 4.2–5.9)
ECHO LV INTERNAL DIMENSION SYSTOLIC INDEX: 2.45 CM/M2
ECHO LV INTERNAL DIMENSION SYSTOLIC: 4.7 CM
ECHO LV IVSD: 0.9 CM (ref 0.6–1)
ECHO LV IVSS: 1 CM
ECHO LV MASS 2D: 169 G (ref 88–224)
ECHO LV MASS INDEX 2D: 88 G/M2 (ref 49–115)
ECHO LV POSTERIOR WALL DIASTOLIC: 0.9 CM (ref 0.6–1)
ECHO LV POSTERIOR WALL SYSTOLIC: 1.1 CM
ECHO LV RELATIVE WALL THICKNESS RATIO: 0.35
ECHO MV A VELOCITY: 0.88 M/S
ECHO MV AREA PHT: 3.1 CM2
ECHO MV E DECELERATION TIME (DT): 79.1 MS
ECHO MV E VELOCITY: 0.65 M/S
ECHO MV E/A RATIO: 0.74
ECHO MV EROA PISA: 0.3 CM2
ECHO MV MAX VELOCITY: 0.8 M/S
ECHO MV MEAN GRADIENT: 2 MMHG
ECHO MV MEAN VELOCITY: 0.7 M/S
ECHO MV PEAK GRADIENT: 3 MMHG
ECHO MV PRESSURE HALF TIME (PHT): 70.4 MS
ECHO MV REGURGITANT ALIASING (NYQUIST) VELOCITY: 37 CM/S
ECHO MV REGURGITANT RADIUS PISA: 0.72 CM
ECHO MV REGURGITANT VELOCITY PISA: 4.7 M/S
ECHO MV REGURGITANT VOLUME PISA: 35.52 ML
ECHO MV REGURGITANT VTIA: 138.6 CM
ECHO MV VTI: 18.9 CM
ECHO PULMONARY ARTERY SYSTOLIC PRESSURE (PASP): 41 MMHG
ECHO RIGHT VENTRICULAR SYSTOLIC PRESSURE (RVSP): 41 MMHG
ECHO RV INTERNAL DIMENSION: 3.9 CM
ECHO TV REGURGITANT MAX VELOCITY: 3.08 M/S
ECHO TV REGURGITANT PEAK GRADIENT: 38 MMHG
EOSINOPHIL # BLD: 0 K/UL (ref 0.05–0.5)
EOSINOPHILS RELATIVE PERCENT: 0 % (ref 0–6)
ERYTHROCYTE [DISTWIDTH] IN BLOOD BY AUTOMATED COUNT: 15.2 % (ref 11.5–15)
GFR SERPL CREATININE-BSD FRML MDRD: >60 ML/MIN/1.73M2
GFR SERPL CREATININE-BSD FRML MDRD: >60 ML/MIN/1.73M2
GFR SERPL CREATININE-BSD FRML MDRD: NORMAL ML/MIN/1.73M2
GLUCOSE SERPL-MCNC: 132 MG/DL (ref 74–99)
GLUCOSE SERPL-MCNC: 97 MG/DL (ref 74–99)
GLUCOSE SERPL-MCNC: NORMAL MG/DL (ref 70–99)
HCO3: 20.2 MMOL/L (ref 22–26)
HCT VFR BLD AUTO: 33.9 % (ref 37–54)
HGB BLD-MCNC: 11.7 G/DL (ref 12.5–16.5)
HHB: 0.6 % (ref 0–5)
IMM GRANULOCYTES # BLD AUTO: 0.11 K/UL (ref 0–0.58)
IMM GRANULOCYTES NFR BLD: 2 % (ref 0–5)
INR PPP: 1.5
LAB: ABNORMAL
LACTATE BLDV-SCNC: 2.6 MMOL/L (ref 0.5–2.2)
LACTATE BLDV-SCNC: 8.2 MMOL/L (ref 0.5–2.2)
LACTATE BLDV-SCNC: NORMAL MMOL/L
LACTIC ACID, WHOLE BLOOD: NORMAL MMOL/L (ref 0.7–2.1)
LYMPHOCYTES NFR BLD: 2.22 K/UL (ref 1.5–4)
LYMPHOCYTES RELATIVE PERCENT: 31 % (ref 20–42)
Lab: 758
MAGNESIUM SERPL-MCNC: 1.8 MG/DL (ref 1.6–2.6)
MAGNESIUM SERPL-MCNC: 3.2 MG/DL (ref 1.6–2.6)
MAGNESIUM SERPL-MCNC: NORMAL MG/DL (ref 1.6–2.6)
MCH RBC QN AUTO: 33 PG (ref 26–35)
MCHC RBC AUTO-ENTMCNC: 34.5 G/DL (ref 32–34.5)
MCV RBC AUTO: 95.5 FL (ref 80–99.9)
METHB: 0.5 % (ref 0–1.5)
MODE: ABNORMAL
MONOCYTES NFR BLD: 0.62 K/UL (ref 0.1–0.95)
MONOCYTES NFR BLD: 9 % (ref 2–12)
NEUTROPHILS NFR BLD: 58 % (ref 43–80)
NEUTS SEG NFR BLD: 4.14 K/UL (ref 1.8–7.3)
O2 CONTENT: 19.3 ML/DL
O2 SATURATION: 99.4 % (ref 92–98.5)
O2HB: 97.4 % (ref 94–97)
OPERATOR ID: 8215
PATIENT TEMP: 37 C
PCO2: 35.6 MMHG (ref 35–45)
PH BLOOD GAS: 7.37 (ref 7.35–7.45)
PHOSPHATE SERPL-MCNC: 2.8 MG/DL (ref 2.5–4.5)
PHOSPHATE SERPL-MCNC: NORMAL MG/DL (ref 2.5–4.5)
PLATELET CONFIRMATION: NORMAL
PLATELET, FLUORESCENCE: 48 K/UL (ref 130–450)
PMV BLD AUTO: 11.7 FL (ref 7–12)
PO2: 206.9 MMHG (ref 75–100)
POTASSIUM SERPL-SCNC: 2.9 MMOL/L (ref 3.5–5)
POTASSIUM SERPL-SCNC: 3.3 MMOL/L (ref 3.5–5)
POTASSIUM SERPL-SCNC: 3.51 MMOL/L (ref 3.5–5)
POTASSIUM SERPL-SCNC: NORMAL MMOL/L (ref 3.7–5.3)
PROCALCITONIN SERPL-MCNC: 1.16 NG/ML (ref 0–0.08)
PROLACTIN SERPL-MCNC: 33.67 NG/ML
PROT SERPL-MCNC: 7.8 G/DL (ref 6.4–8.3)
PROTHROMBIN TIME: 16.1 SEC (ref 9.3–12.4)
RBC # BLD AUTO: 3.55 M/UL (ref 3.8–5.8)
RBC # BLD: ABNORMAL 10*6/UL
SODIUM SERPL-SCNC: 132 MMOL/L (ref 132–146)
SODIUM SERPL-SCNC: 133 MMOL/L (ref 132–146)
SODIUM SERPL-SCNC: NORMAL MMOL/L (ref 135–144)
SOURCE, BLOOD GAS: ABNORMAL
THB: 13.8 G/DL (ref 11.5–16.5)
TIME ANALYZED: 800
TROPONIN I SERPL HS-MCNC: 98 NG/L (ref 0–11)
WBC OTHER # BLD: 7.1 K/UL (ref 4.5–11.5)

## 2024-02-16 PROCEDURE — 84100 ASSAY OF PHOSPHORUS: CPT

## 2024-02-16 PROCEDURE — 71045 X-RAY EXAM CHEST 1 VIEW: CPT

## 2024-02-16 PROCEDURE — 2580000003 HC RX 258: Performed by: INTERNAL MEDICINE

## 2024-02-16 PROCEDURE — 31500 INSERT EMERGENCY AIRWAY: CPT

## 2024-02-16 PROCEDURE — 93325 DOPPLER ECHO COLOR FLOW MAPG: CPT | Performed by: INTERNAL MEDICINE

## 2024-02-16 PROCEDURE — 85610 PROTHROMBIN TIME: CPT

## 2024-02-16 PROCEDURE — 84145 PROCALCITONIN (PCT): CPT

## 2024-02-16 PROCEDURE — 87205 SMEAR GRAM STAIN: CPT

## 2024-02-16 PROCEDURE — 99232 SBSQ HOSP IP/OBS MODERATE 35: CPT | Performed by: INTERNAL MEDICINE

## 2024-02-16 PROCEDURE — 6370000000 HC RX 637 (ALT 250 FOR IP): Performed by: INTERNAL MEDICINE

## 2024-02-16 PROCEDURE — 2580000003 HC RX 258

## 2024-02-16 PROCEDURE — 5A1945Z RESPIRATORY VENTILATION, 24-96 CONSECUTIVE HOURS: ICD-10-PCS | Performed by: INTERNAL MEDICINE

## 2024-02-16 PROCEDURE — 93308 TTE F-UP OR LMTD: CPT | Performed by: INTERNAL MEDICINE

## 2024-02-16 PROCEDURE — 6360000002 HC RX W HCPCS: Performed by: STUDENT IN AN ORGANIZED HEALTH CARE EDUCATION/TRAINING PROGRAM

## 2024-02-16 PROCEDURE — 84484 ASSAY OF TROPONIN QUANT: CPT

## 2024-02-16 PROCEDURE — 70450 CT HEAD/BRAIN W/O DYE: CPT

## 2024-02-16 PROCEDURE — 6360000002 HC RX W HCPCS: Performed by: INTERNAL MEDICINE

## 2024-02-16 PROCEDURE — 94002 VENT MGMT INPAT INIT DAY: CPT

## 2024-02-16 PROCEDURE — 84132 ASSAY OF SERUM POTASSIUM: CPT

## 2024-02-16 PROCEDURE — 6370000000 HC RX 637 (ALT 250 FOR IP)

## 2024-02-16 PROCEDURE — 84146 ASSAY OF PROLACTIN: CPT

## 2024-02-16 PROCEDURE — 36556 INSERT NON-TUNNEL CV CATH: CPT

## 2024-02-16 PROCEDURE — 51702 INSERT TEMP BLADDER CATH: CPT

## 2024-02-16 PROCEDURE — 2000000000 HC ICU R&B

## 2024-02-16 PROCEDURE — 80053 COMPREHEN METABOLIC PANEL: CPT

## 2024-02-16 PROCEDURE — 6360000002 HC RX W HCPCS

## 2024-02-16 PROCEDURE — 85025 COMPLETE CBC W/AUTO DIFF WBC: CPT

## 2024-02-16 PROCEDURE — 36415 COLL VENOUS BLD VENIPUNCTURE: CPT

## 2024-02-16 PROCEDURE — 82533 TOTAL CORTISOL: CPT

## 2024-02-16 PROCEDURE — 6370000000 HC RX 637 (ALT 250 FOR IP): Performed by: NURSE PRACTITIONER

## 2024-02-16 PROCEDURE — 2500000003 HC RX 250 WO HCPCS: Performed by: STUDENT IN AN ORGANIZED HEALTH CARE EDUCATION/TRAINING PROGRAM

## 2024-02-16 PROCEDURE — 93308 TTE F-UP OR LMTD: CPT

## 2024-02-16 PROCEDURE — 93321 DOPPLER ECHO F-UP/LMTD STD: CPT | Performed by: INTERNAL MEDICINE

## 2024-02-16 PROCEDURE — 87040 BLOOD CULTURE FOR BACTERIA: CPT

## 2024-02-16 PROCEDURE — 80048 BASIC METABOLIC PNL TOTAL CA: CPT

## 2024-02-16 PROCEDURE — 3E033XZ INTRODUCTION OF VASOPRESSOR INTO PERIPHERAL VEIN, PERCUTANEOUS APPROACH: ICD-10-PCS | Performed by: INTERNAL MEDICINE

## 2024-02-16 PROCEDURE — 83735 ASSAY OF MAGNESIUM: CPT

## 2024-02-16 PROCEDURE — 99291 CRITICAL CARE FIRST HOUR: CPT | Performed by: INTERNAL MEDICINE

## 2024-02-16 PROCEDURE — 83605 ASSAY OF LACTIC ACID: CPT

## 2024-02-16 PROCEDURE — 87070 CULTURE OTHR SPECIMN AEROBIC: CPT

## 2024-02-16 PROCEDURE — 82805 BLOOD GASES W/O2 SATURATION: CPT

## 2024-02-16 PROCEDURE — 93005 ELECTROCARDIOGRAM TRACING: CPT | Performed by: NURSE PRACTITIONER

## 2024-02-16 PROCEDURE — 2500000003 HC RX 250 WO HCPCS

## 2024-02-16 PROCEDURE — P9047 ALBUMIN (HUMAN), 25%, 50ML: HCPCS

## 2024-02-16 RX ORDER — DEXTROSE MONOHYDRATE 100 MG/ML
INJECTION, SOLUTION INTRAVENOUS CONTINUOUS PRN
Status: DISCONTINUED | OUTPATIENT
Start: 2024-02-16 | End: 2024-02-29 | Stop reason: HOSPADM

## 2024-02-16 RX ORDER — SODIUM CHLORIDE, SODIUM LACTATE, POTASSIUM CHLORIDE, AND CALCIUM CHLORIDE .6; .31; .03; .02 G/100ML; G/100ML; G/100ML; G/100ML
500 INJECTION, SOLUTION INTRAVENOUS ONCE
Status: COMPLETED | OUTPATIENT
Start: 2024-02-16 | End: 2024-02-16

## 2024-02-16 RX ORDER — HYDROXYZINE PAMOATE 25 MG/1
25 CAPSULE ORAL 3 TIMES DAILY PRN
Status: DISCONTINUED | OUTPATIENT
Start: 2024-02-16 | End: 2024-02-29 | Stop reason: HOSPADM

## 2024-02-16 RX ORDER — FENTANYL CITRATE-0.9 % NACL/PF 10 MCG/ML
25-200 PLASTIC BAG, INJECTION (ML) INTRAVENOUS CONTINUOUS
Status: DISCONTINUED | OUTPATIENT
Start: 2024-02-16 | End: 2024-02-20

## 2024-02-16 RX ORDER — MIDAZOLAM HYDROCHLORIDE 1 MG/ML
1-10 INJECTION, SOLUTION INTRAVENOUS CONTINUOUS
Status: DISCONTINUED | OUTPATIENT
Start: 2024-02-16 | End: 2024-02-16

## 2024-02-16 RX ORDER — LEVETIRACETAM 500 MG/5ML
1500 INJECTION, SOLUTION, CONCENTRATE INTRAVENOUS EVERY 12 HOURS
Status: DISCONTINUED | OUTPATIENT
Start: 2024-02-16 | End: 2024-02-27

## 2024-02-16 RX ORDER — MECOBALAMIN 5000 MCG
5 TABLET,DISINTEGRATING ORAL NIGHTLY PRN
Status: DISCONTINUED | OUTPATIENT
Start: 2024-02-16 | End: 2024-02-29 | Stop reason: HOSPADM

## 2024-02-16 RX ORDER — POTASSIUM CHLORIDE 7.45 MG/ML
10 INJECTION INTRAVENOUS
Status: DISPENSED | OUTPATIENT
Start: 2024-02-16 | End: 2024-02-16

## 2024-02-16 RX ORDER — ALBUMIN (HUMAN) 12.5 G/50ML
25 SOLUTION INTRAVENOUS ONCE
Status: COMPLETED | OUTPATIENT
Start: 2024-02-16 | End: 2024-02-16

## 2024-02-16 RX ORDER — GLUCAGON 1 MG/ML
1 KIT INJECTION PRN
Status: DISCONTINUED | OUTPATIENT
Start: 2024-02-16 | End: 2024-02-29 | Stop reason: HOSPADM

## 2024-02-16 RX ORDER — MAGNESIUM SULFATE IN WATER 40 MG/ML
2000 INJECTION, SOLUTION INTRAVENOUS ONCE
Status: COMPLETED | OUTPATIENT
Start: 2024-02-16 | End: 2024-02-16

## 2024-02-16 RX ORDER — MIDAZOLAM HYDROCHLORIDE 2 MG/2ML
4 INJECTION, SOLUTION INTRAMUSCULAR; INTRAVENOUS ONCE
Status: COMPLETED | OUTPATIENT
Start: 2024-02-16 | End: 2024-02-16

## 2024-02-16 RX ORDER — MIDAZOLAM HYDROCHLORIDE 1 MG/ML
INJECTION INTRAMUSCULAR; INTRAVENOUS
Status: COMPLETED
Start: 2024-02-16 | End: 2024-02-16

## 2024-02-16 RX ORDER — SODIUM CHLORIDE, SODIUM LACTATE, POTASSIUM CHLORIDE, CALCIUM CHLORIDE 600; 310; 30; 20 MG/100ML; MG/100ML; MG/100ML; MG/100ML
INJECTION, SOLUTION INTRAVENOUS CONTINUOUS
Status: DISCONTINUED | OUTPATIENT
Start: 2024-02-16 | End: 2024-02-26

## 2024-02-16 RX ORDER — MECOBALAMIN 5000 MCG
5 TABLET,DISINTEGRATING ORAL EVERY EVENING
Status: DISCONTINUED | OUTPATIENT
Start: 2024-02-16 | End: 2024-02-17

## 2024-02-16 RX ORDER — NOREPINEPHRINE BITARTRATE 0.06 MG/ML
1-100 INJECTION, SOLUTION INTRAVENOUS CONTINUOUS
Status: DISCONTINUED | OUTPATIENT
Start: 2024-02-16 | End: 2024-02-18

## 2024-02-16 RX ORDER — SODIUM CHLORIDE, SODIUM LACTATE, POTASSIUM CHLORIDE, AND CALCIUM CHLORIDE .6; .31; .03; .02 G/100ML; G/100ML; G/100ML; G/100ML
1000 INJECTION, SOLUTION INTRAVENOUS ONCE
Status: DISCONTINUED | OUTPATIENT
Start: 2024-02-16 | End: 2024-02-16

## 2024-02-16 RX ORDER — POTASSIUM CHLORIDE 7.45 MG/ML
10 INJECTION INTRAVENOUS
Status: COMPLETED | OUTPATIENT
Start: 2024-02-16 | End: 2024-02-16

## 2024-02-16 RX ORDER — HYDRALAZINE HYDROCHLORIDE 20 MG/ML
10 INJECTION INTRAMUSCULAR; INTRAVENOUS EVERY 6 HOURS PRN
Status: DISCONTINUED | OUTPATIENT
Start: 2024-02-16 | End: 2024-02-29 | Stop reason: HOSPADM

## 2024-02-16 RX ORDER — CALCIUM CARBONATE 500 MG/1
500 TABLET, CHEWABLE ORAL 3 TIMES DAILY PRN
Status: DISCONTINUED | OUTPATIENT
Start: 2024-02-16 | End: 2024-02-29 | Stop reason: HOSPADM

## 2024-02-16 RX ORDER — PROPOFOL 10 MG/ML
5-50 INJECTION, EMULSION INTRAVENOUS CONTINUOUS
Status: DISCONTINUED | OUTPATIENT
Start: 2024-02-16 | End: 2024-02-18

## 2024-02-16 RX ORDER — BENZONATATE 100 MG/1
100 CAPSULE ORAL 3 TIMES DAILY PRN
Status: DISCONTINUED | OUTPATIENT
Start: 2024-02-16 | End: 2024-02-29 | Stop reason: HOSPADM

## 2024-02-16 RX ORDER — HYDROXYZINE HYDROCHLORIDE 10 MG/1
25 TABLET, FILM COATED ORAL 3 TIMES DAILY PRN
Status: DISCONTINUED | OUTPATIENT
Start: 2024-02-16 | End: 2024-02-16

## 2024-02-16 RX ORDER — MIDAZOLAM HYDROCHLORIDE 1 MG/ML
1-10 INJECTION, SOLUTION INTRAVENOUS CONTINUOUS
Status: DISCONTINUED | OUTPATIENT
Start: 2024-02-16 | End: 2024-02-20

## 2024-02-16 RX ADMIN — POTASSIUM CHLORIDE 10 MEQ: 7.46 INJECTION, SOLUTION INTRAVENOUS at 11:03

## 2024-02-16 RX ADMIN — POTASSIUM CHLORIDE 10 MEQ: 7.46 INJECTION, SOLUTION INTRAVENOUS at 12:59

## 2024-02-16 RX ADMIN — VANCOMYCIN HYDROCHLORIDE 1750 MG: 10 INJECTION, POWDER, LYOPHILIZED, FOR SOLUTION INTRAVENOUS at 23:11

## 2024-02-16 RX ADMIN — SODIUM CHLORIDE, POTASSIUM CHLORIDE, SODIUM LACTATE AND CALCIUM CHLORIDE 500 ML: 600; 310; 30; 20 INJECTION, SOLUTION INTRAVENOUS at 20:06

## 2024-02-16 RX ADMIN — Medication 2 MG/HR: at 08:30

## 2024-02-16 RX ADMIN — SODIUM CHLORIDE, PRESERVATIVE FREE 10 ML: 5 INJECTION INTRAVENOUS at 11:01

## 2024-02-16 RX ADMIN — ALBUMIN (HUMAN) 25 G: 0.25 INJECTION, SOLUTION INTRAVENOUS at 22:10

## 2024-02-16 RX ADMIN — MIDAZOLAM HYDROCHLORIDE 4 MG: 1 INJECTION, SOLUTION INTRAMUSCULAR; INTRAVENOUS at 20:08

## 2024-02-16 RX ADMIN — POTASSIUM BICARBONATE 40 MEQ: 782 TABLET, EFFERVESCENT ORAL at 10:58

## 2024-02-16 RX ADMIN — HYDROXYZINE HYDROCHLORIDE 25 MG: 10 TABLET, FILM COATED ORAL at 06:29

## 2024-02-16 RX ADMIN — POTASSIUM CHLORIDE 10 MEQ: 7.46 INJECTION, SOLUTION INTRAVENOUS at 20:15

## 2024-02-16 RX ADMIN — SODIUM CHLORIDE, POTASSIUM CHLORIDE, SODIUM LACTATE AND CALCIUM CHLORIDE: 600; 310; 30; 20 INJECTION, SOLUTION INTRAVENOUS at 18:30

## 2024-02-16 RX ADMIN — POTASSIUM CHLORIDE 10 MEQ: 7.46 INJECTION, SOLUTION INTRAVENOUS at 21:28

## 2024-02-16 RX ADMIN — Medication 50 MCG/HR: at 08:28

## 2024-02-16 RX ADMIN — POTASSIUM BICARBONATE 40 MEQ: 782 TABLET, EFFERVESCENT ORAL at 19:55

## 2024-02-16 RX ADMIN — Medication 4 MG/HR: at 12:11

## 2024-02-16 RX ADMIN — MAGNESIUM SULFATE HEPTAHYDRATE 2000 MG: 40 INJECTION, SOLUTION INTRAVENOUS at 19:56

## 2024-02-16 RX ADMIN — SODIUM CHLORIDE, POTASSIUM CHLORIDE, SODIUM LACTATE AND CALCIUM CHLORIDE: 600; 310; 30; 20 INJECTION, SOLUTION INTRAVENOUS at 19:51

## 2024-02-16 RX ADMIN — POTASSIUM CHLORIDE 20 MEQ: 1500 TABLET, EXTENDED RELEASE ORAL at 10:59

## 2024-02-16 RX ADMIN — Medication 5 MCG/MIN: at 20:18

## 2024-02-16 RX ADMIN — AZTREONAM 2000 MG: 2 INJECTION, POWDER, LYOPHILIZED, FOR SOLUTION INTRAMUSCULAR; INTRAVENOUS at 23:07

## 2024-02-16 RX ADMIN — LEVETIRACETAM 1500 MG: 100 INJECTION INTRAVENOUS at 12:53

## 2024-02-16 RX ADMIN — POTASSIUM CHLORIDE 10 MEQ: 7.46 INJECTION, SOLUTION INTRAVENOUS at 11:59

## 2024-02-16 RX ADMIN — ACETAMINOPHEN 650 MG: 325 TABLET ORAL at 12:46

## 2024-02-16 RX ADMIN — POTASSIUM CHLORIDE 10 MEQ: 7.46 INJECTION, SOLUTION INTRAVENOUS at 14:11

## 2024-02-16 RX ADMIN — Medication 10 ML: at 11:00

## 2024-02-16 RX ADMIN — NADOLOL 20 MG: 20 TABLET ORAL at 12:19

## 2024-02-16 RX ADMIN — PANTOPRAZOLE SODIUM 40 MG: 40 TABLET, DELAYED RELEASE ORAL at 06:29

## 2024-02-16 RX ADMIN — MIDAZOLAM HYDROCHLORIDE 4 MG: 1 INJECTION, SOLUTION INTRAMUSCULAR; INTRAVENOUS at 12:09

## 2024-02-16 RX ADMIN — MIDAZOLAM HYDROCHLORIDE 4 MG: 2 INJECTION, SOLUTION INTRAMUSCULAR; INTRAVENOUS at 12:09

## 2024-02-16 ASSESSMENT — PULMONARY FUNCTION TESTS
PIF_VALUE: 15
PIF_VALUE: 23
PIF_VALUE: 20
PIF_VALUE: 0
PIF_VALUE: 20
PIF_VALUE: 21
PIF_VALUE: 19
PIF_VALUE: 21
PIF_VALUE: 20
PIF_VALUE: 22
PIF_VALUE: 20
PIF_VALUE: 22
PIF_VALUE: 22
PIF_VALUE: 20
PIF_VALUE: 23
PIF_VALUE: 20
PIF_VALUE: 21
PIF_VALUE: 19
PIF_VALUE: 20
PIF_VALUE: 22
PIF_VALUE: 18
PIF_VALUE: 22
PIF_VALUE: 21
PIF_VALUE: 20
PIF_VALUE: 22
PIF_VALUE: 22
PIF_VALUE: 20
PIF_VALUE: 21
PIF_VALUE: 20
PIF_VALUE: 19
PIF_VALUE: 20

## 2024-02-16 NOTE — FLOWSHEET NOTE
During bedside side handoff at 0745 pt sitting up in bed, stating he needed to urinate, pt laid back in bed had whole body tremor for 5 sec. Then stopped breathing. Code blue called

## 2024-02-16 NOTE — SIGNIFICANT EVENT
Code Blue Note    Code blue was called at 7:49am    On arrival, patient was supine with compressions being performed.     Initial rhythm was noted to be v fib    ACLS protocol was initiated and the following was given:    Epinephrine: 1   Magnesium sulfate: 2g  Defibrillation: once at 200J    Patient intubated and placed on ventilator.  Central line inserted per team members.  ABG obtained revealed: 7.37/36/207/20    We continued CPR for 4 minutes, and pt was revived and transferred to the ICU.    Family, PCP and Intensivist were updated.    Aura Bradford MD, PGY-3  2/16/2024  8:13 AM

## 2024-02-16 NOTE — SIGNIFICANT EVENT
Rapid Response Team Note  Date of event: 2/15/2024   Time of event: 2:43pm  Shahzad Rodgers 68 y.o. year old male   YOB: 1955   Admit date:  2/14/2024   Location: 8415/8415-   Witnessed? : [x]Yes  [] No  Monitored? : [x]Yes  [] No  Code status: [x] Full  [] DNR-CCA  []DNR-CC  ______________________________________________________________________  Reason for RRT:    [] RR < 8     [] RR > 28   [] SpO2 <90%   [] HR < 40 bpm   [] HR > 130 bpm  [] SBP < 90 mmHg    [] SpO2 <90%   [] LOC   [x] Seizures    [] Significant Bleeding Event    [x] Other: fall    Subjective:   CTSP regarding the above event, bedside RN reported that patient had an unwitnessed fall in the bathroom when attempting to toilet himself. Patient states he lost his balance and hit his head on the floor. RRT also called as patient started shaking his limbs and there was concern for seizure. Patient able to talk to me throughout the limb shaking and was alert and oriented to self and place. Patient states he has used alcohol in the past but quit 1 month ago. He denies any history of seizures.     Objective:   Vital signs: BP: 151/68/HR: 72  Initial Condition:  Conscious   [x] Yes  [] No     Breathing [x] Yes  [] No     Pulse  [x] Yes  [] No    Airway:   [x] Open/ Clear     Intervention: [x] None  [] Pooled secretions     [] Suctioned  [] Stridor      [] Intubation    Lungs:   [x] Symmetrical chest rise/ CTABL Intervention: [x] None  [] Use of accessory muscles    [] NIV (CPAP/BiPAP)  [] Cyanosis      [] Nasal Oxygen/Mask  [] Wheezing       [] ABG             [] CXR  [] Other:     Circulation:   Rhythm:  [x] Sinus [] Other:   Intervention: [x] None            [] IV Access  [] Peripheral              [] Central            [] EKG            [] Cardioversion            [] Defibrillation     Capillary Refill:  [x] > 2 seconds [] < 2 seconds    Neurologic:     Response to pain:   [x] Yes  [] No  Follow commands:  [x] Yes  [] No  Facial asymmetry:

## 2024-02-17 ENCOUNTER — APPOINTMENT (OUTPATIENT)
Dept: GENERAL RADIOLOGY | Age: 69
DRG: 981 | End: 2024-02-17
Payer: COMMERCIAL

## 2024-02-17 PROBLEM — I47.21 TORSADES DE POINTES (HCC): Status: ACTIVE | Noted: 2024-02-17

## 2024-02-17 PROBLEM — I38 VALVULAR HEART DISEASE: Status: ACTIVE | Noted: 2024-02-17

## 2024-02-17 PROBLEM — I50.20 HFREF (HEART FAILURE WITH REDUCED EJECTION FRACTION) (HCC): Status: ACTIVE | Noted: 2024-02-17

## 2024-02-17 PROBLEM — I49.3 VENTRICULAR ECTOPY: Status: ACTIVE | Noted: 2024-02-17

## 2024-02-17 PROBLEM — R94.31 PROLONGED Q-T INTERVAL ON ECG: Status: ACTIVE | Noted: 2024-02-17

## 2024-02-17 LAB
AADO2: 166.3 MMHG
ALBUMIN SERPL-MCNC: 2.7 G/DL (ref 3.5–5.2)
ALP SERPL-CCNC: 175 U/L (ref 40–129)
ALT SERPL-CCNC: 33 U/L (ref 0–40)
ANION GAP SERPL CALCULATED.3IONS-SCNC: 11 MMOL/L (ref 7–16)
ANION GAP SERPL CALCULATED.3IONS-SCNC: 15 MMOL/L (ref 7–16)
AST SERPL-CCNC: 284 U/L (ref 0–39)
B.E.: 5.3 MMOL/L (ref -3–3)
BILIRUB SERPL-MCNC: 9.1 MG/DL (ref 0–1.2)
BNP SERPL-MCNC: 5925 PG/ML (ref 0–125)
BUN SERPL-MCNC: 19 MG/DL (ref 6–23)
BUN SERPL-MCNC: 22 MG/DL (ref 6–23)
CALCIUM SERPL-MCNC: 6.9 MG/DL (ref 8.6–10.2)
CALCIUM SERPL-MCNC: 7.1 MG/DL (ref 8.6–10.2)
CHLORIDE SERPL-SCNC: 92 MMOL/L (ref 98–107)
CHLORIDE SERPL-SCNC: 96 MMOL/L (ref 98–107)
CO2 SERPL-SCNC: 26 MMOL/L (ref 22–29)
CO2 SERPL-SCNC: 28 MMOL/L (ref 22–29)
COHB: 1.3 % (ref 0–1.5)
CORTIS SERPL-MCNC: 23.9 UG/DL (ref 2.7–18.4)
CORTISOL COLLECTION INFO: ABNORMAL
CREAT SERPL-MCNC: 0.6 MG/DL (ref 0.7–1.2)
CREAT SERPL-MCNC: 0.8 MG/DL (ref 0.7–1.2)
CRITICAL: ABNORMAL
DATE ANALYZED: ABNORMAL
DATE OF COLLECTION: ABNORMAL
ERYTHROCYTE [DISTWIDTH] IN BLOOD BY AUTOMATED COUNT: 15.9 % (ref 11.5–15)
FIO2: 40 %
GFR SERPL CREATININE-BSD FRML MDRD: >60 ML/MIN/1.73M2
GFR SERPL CREATININE-BSD FRML MDRD: >60 ML/MIN/1.73M2
GLUCOSE SERPL-MCNC: 121 MG/DL (ref 74–99)
GLUCOSE SERPL-MCNC: 138 MG/DL (ref 74–99)
HCO3: 26.5 MMOL/L (ref 22–26)
HCT VFR BLD AUTO: 30.1 % (ref 37–54)
HGB BLD-MCNC: 10.4 G/DL (ref 12.5–16.5)
HHB: 4.3 % (ref 0–5)
LAB: ABNORMAL
LACTATE BLDV-SCNC: 1.8 MMOL/L (ref 0.5–2.2)
Lab: 445
MAGNESIUM SERPL-MCNC: 2.1 MG/DL (ref 1.6–2.6)
MCH RBC QN AUTO: 32.6 PG (ref 26–35)
MCHC RBC AUTO-ENTMCNC: 34.6 G/DL (ref 32–34.5)
MCV RBC AUTO: 94.4 FL (ref 80–99.9)
METHB: 0.2 % (ref 0–1.5)
MODE: AC
O2 CONTENT: 15.6 ML/DL
O2 SATURATION: 95.6 % (ref 92–98.5)
O2HB: 94.2 % (ref 94–97)
OPERATOR ID: 2863
PATIENT TEMP: 37 C
PCO2: 28.1 MMHG (ref 35–45)
PEEP/CPAP: 8 CMH2O
PFO2: 1.91 MMHG/%
PH BLOOD GAS: 7.59 (ref 7.35–7.45)
PHOSPHATE SERPL-MCNC: 1.2 MG/DL (ref 2.5–4.5)
PLATELET CONFIRMATION: NORMAL
PLATELET, FLUORESCENCE: 60 K/UL (ref 130–450)
PMV BLD AUTO: 11.4 FL (ref 7–12)
PO2: 76.6 MMHG (ref 75–100)
POTASSIUM SERPL-SCNC: 3.2 MMOL/L (ref 3.5–5)
POTASSIUM SERPL-SCNC: 3.5 MMOL/L (ref 3.5–5)
PROT SERPL-MCNC: 6.4 G/DL (ref 6.4–8.3)
RBC # BLD AUTO: 3.19 M/UL (ref 3.8–5.8)
RI(T): 2.17
RR MECHANICAL: 16 B/MIN
SODIUM SERPL-SCNC: 133 MMOL/L (ref 132–146)
SODIUM SERPL-SCNC: 135 MMOL/L (ref 132–146)
SOURCE, BLOOD GAS: ABNORMAL
THB: 11.7 G/DL (ref 11.5–16.5)
TIME ANALYZED: 456
TROPONIN I SERPL HS-MCNC: 63 NG/L (ref 0–11)
VT MECHANICAL: 450 ML
WBC OTHER # BLD: 9.2 K/UL (ref 4.5–11.5)

## 2024-02-17 PROCEDURE — 2000000000 HC ICU R&B

## 2024-02-17 PROCEDURE — 2580000003 HC RX 258

## 2024-02-17 PROCEDURE — 2500000003 HC RX 250 WO HCPCS

## 2024-02-17 PROCEDURE — 99291 CRITICAL CARE FIRST HOUR: CPT | Performed by: INTERNAL MEDICINE

## 2024-02-17 PROCEDURE — 30233N1 TRANSFUSION OF NONAUTOLOGOUS RED BLOOD CELLS INTO PERIPHERAL VEIN, PERCUTANEOUS APPROACH: ICD-10-PCS | Performed by: INTERNAL MEDICINE

## 2024-02-17 PROCEDURE — 6360000002 HC RX W HCPCS: Performed by: INTERNAL MEDICINE

## 2024-02-17 PROCEDURE — P9047 ALBUMIN (HUMAN), 25%, 50ML: HCPCS

## 2024-02-17 PROCEDURE — 82805 BLOOD GASES W/O2 SATURATION: CPT

## 2024-02-17 PROCEDURE — 6360000002 HC RX W HCPCS

## 2024-02-17 PROCEDURE — 83880 ASSAY OF NATRIURETIC PEPTIDE: CPT

## 2024-02-17 PROCEDURE — 6370000000 HC RX 637 (ALT 250 FOR IP)

## 2024-02-17 PROCEDURE — 83735 ASSAY OF MAGNESIUM: CPT

## 2024-02-17 PROCEDURE — 2580000003 HC RX 258: Performed by: INTERNAL MEDICINE

## 2024-02-17 PROCEDURE — 99232 SBSQ HOSP IP/OBS MODERATE 35: CPT | Performed by: INTERNAL MEDICINE

## 2024-02-17 PROCEDURE — 80053 COMPREHEN METABOLIC PANEL: CPT

## 2024-02-17 PROCEDURE — 99233 SBSQ HOSP IP/OBS HIGH 50: CPT | Performed by: INTERNAL MEDICINE

## 2024-02-17 PROCEDURE — 36556 INSERT NON-TUNNEL CV CATH: CPT

## 2024-02-17 PROCEDURE — 84100 ASSAY OF PHOSPHORUS: CPT

## 2024-02-17 PROCEDURE — 84484 ASSAY OF TROPONIN QUANT: CPT

## 2024-02-17 PROCEDURE — APPSS180 APP SPLIT SHARED TIME > 60 MINUTES: Performed by: NURSE PRACTITIONER

## 2024-02-17 PROCEDURE — 93005 ELECTROCARDIOGRAM TRACING: CPT | Performed by: INTERNAL MEDICINE

## 2024-02-17 PROCEDURE — 80048 BASIC METABOLIC PNL TOTAL CA: CPT

## 2024-02-17 PROCEDURE — 71045 X-RAY EXAM CHEST 1 VIEW: CPT

## 2024-02-17 PROCEDURE — 94003 VENT MGMT INPAT SUBQ DAY: CPT

## 2024-02-17 PROCEDURE — C9113 INJ PANTOPRAZOLE SODIUM, VIA: HCPCS

## 2024-02-17 PROCEDURE — 85027 COMPLETE CBC AUTOMATED: CPT

## 2024-02-17 PROCEDURE — 36592 COLLECT BLOOD FROM PICC: CPT

## 2024-02-17 PROCEDURE — 83605 ASSAY OF LACTIC ACID: CPT

## 2024-02-17 RX ORDER — SODIUM CHLORIDE, SODIUM LACTATE, POTASSIUM CHLORIDE, AND CALCIUM CHLORIDE .6; .31; .03; .02 G/100ML; G/100ML; G/100ML; G/100ML
250 INJECTION, SOLUTION INTRAVENOUS ONCE
Status: COMPLETED | OUTPATIENT
Start: 2024-02-17 | End: 2024-02-17

## 2024-02-17 RX ORDER — WATER 10 ML/10ML
INJECTION INTRAMUSCULAR; INTRAVENOUS; SUBCUTANEOUS
Status: COMPLETED
Start: 2024-02-17 | End: 2024-02-17

## 2024-02-17 RX ORDER — DOPAMINE HYDROCHLORIDE 160 MG/100ML
1-20 INJECTION, SOLUTION INTRAVENOUS CONTINUOUS
Status: DISCONTINUED | OUTPATIENT
Start: 2024-02-17 | End: 2024-02-20

## 2024-02-17 RX ORDER — ALBUMIN (HUMAN) 12.5 G/50ML
25 SOLUTION INTRAVENOUS EVERY 8 HOURS
Status: COMPLETED | OUTPATIENT
Start: 2024-02-17 | End: 2024-02-18

## 2024-02-17 RX ADMIN — VANCOMYCIN HYDROCHLORIDE 1000 MG: 1 INJECTION, POWDER, LYOPHILIZED, FOR SOLUTION INTRAVENOUS at 20:50

## 2024-02-17 RX ADMIN — Medication 10 ML: at 20:50

## 2024-02-17 RX ADMIN — SODIUM CHLORIDE, POTASSIUM CHLORIDE, SODIUM LACTATE AND CALCIUM CHLORIDE: 600; 310; 30; 20 INJECTION, SOLUTION INTRAVENOUS at 23:45

## 2024-02-17 RX ADMIN — AZTREONAM 2000 MG: 2 INJECTION, POWDER, LYOPHILIZED, FOR SOLUTION INTRAMUSCULAR; INTRAVENOUS at 06:44

## 2024-02-17 RX ADMIN — HYDROCORTISONE SODIUM SUCCINATE 50 MG: 100 INJECTION, POWDER, FOR SOLUTION INTRAMUSCULAR; INTRAVENOUS at 09:40

## 2024-02-17 RX ADMIN — WATER 10 ML: 1 INJECTION INTRAMUSCULAR; INTRAVENOUS; SUBCUTANEOUS at 09:40

## 2024-02-17 RX ADMIN — Medication 10 ML: at 09:41

## 2024-02-17 RX ADMIN — Medication 4 MG/HR: at 06:20

## 2024-02-17 RX ADMIN — ALBUMIN (HUMAN) 25 G: 0.25 INJECTION, SOLUTION INTRAVENOUS at 09:39

## 2024-02-17 RX ADMIN — HYDROCORTISONE SODIUM SUCCINATE 50 MG: 100 INJECTION, POWDER, FOR SOLUTION INTRAMUSCULAR; INTRAVENOUS at 18:44

## 2024-02-17 RX ADMIN — VANCOMYCIN HYDROCHLORIDE 1000 MG: 1 INJECTION, POWDER, LYOPHILIZED, FOR SOLUTION INTRAVENOUS at 09:37

## 2024-02-17 RX ADMIN — POTASSIUM PHOSPHATE, MONOBASIC AND POTASSIUM PHOSPHATE, DIBASIC 15 MMOL: 224; 236 INJECTION, SOLUTION, CONCENTRATE INTRAVENOUS at 10:54

## 2024-02-17 RX ADMIN — LEVETIRACETAM 1500 MG: 100 INJECTION INTRAVENOUS at 11:52

## 2024-02-17 RX ADMIN — WATER 10 ML: 1 INJECTION INTRAMUSCULAR; INTRAVENOUS; SUBCUTANEOUS at 03:29

## 2024-02-17 RX ADMIN — LEVETIRACETAM 1500 MG: 100 INJECTION INTRAVENOUS at 00:42

## 2024-02-17 RX ADMIN — SODIUM CHLORIDE, POTASSIUM CHLORIDE, SODIUM LACTATE AND CALCIUM CHLORIDE 250 ML: 600; 310; 30; 20 INJECTION, SOLUTION INTRAVENOUS at 03:05

## 2024-02-17 RX ADMIN — SODIUM CHLORIDE, PRESERVATIVE FREE 40 MG: 5 INJECTION INTRAVENOUS at 09:39

## 2024-02-17 RX ADMIN — POTASSIUM BICARBONATE 40 MEQ: 782 TABLET, EFFERVESCENT ORAL at 06:44

## 2024-02-17 RX ADMIN — DOPAMINE HYDROCHLORIDE 5 MCG/KG/MIN: 160 INJECTION, SOLUTION INTRAVENOUS at 11:48

## 2024-02-17 RX ADMIN — AZTREONAM 2000 MG: 2 INJECTION, POWDER, LYOPHILIZED, FOR SOLUTION INTRAMUSCULAR; INTRAVENOUS at 14:55

## 2024-02-17 RX ADMIN — SODIUM CHLORIDE, POTASSIUM CHLORIDE, SODIUM LACTATE AND CALCIUM CHLORIDE: 600; 310; 30; 20 INJECTION, SOLUTION INTRAVENOUS at 15:53

## 2024-02-17 RX ADMIN — HYDROCORTISONE SODIUM SUCCINATE 50 MG: 100 INJECTION, POWDER, FOR SOLUTION INTRAMUSCULAR; INTRAVENOUS at 03:29

## 2024-02-17 RX ADMIN — ALBUMIN (HUMAN) 25 G: 0.25 INJECTION, SOLUTION INTRAVENOUS at 14:54

## 2024-02-17 ASSESSMENT — PULMONARY FUNCTION TESTS
PIF_VALUE: 22
PIF_VALUE: 21
PIF_VALUE: 24
PIF_VALUE: 20
PIF_VALUE: 24
PIF_VALUE: 24
PIF_VALUE: 20
PIF_VALUE: 21
PIF_VALUE: 21
PIF_VALUE: 25
PIF_VALUE: 20
PIF_VALUE: 21
PIF_VALUE: 21
PIF_VALUE: 23
PIF_VALUE: 21
PIF_VALUE: 22
PIF_VALUE: 22
PIF_VALUE: 20
PIF_VALUE: 20
PIF_VALUE: 24
PIF_VALUE: 21
PIF_VALUE: 21
PIF_VALUE: 24
PIF_VALUE: 21
PIF_VALUE: 20
PIF_VALUE: 20
PIF_VALUE: 23
PIF_VALUE: 21
PIF_VALUE: 22

## 2024-02-17 ASSESSMENT — PAIN SCALES - GENERAL
PAINLEVEL_OUTOF10: 0
PAINLEVEL_OUTOF10: 0

## 2024-02-17 NOTE — PROCEDURES
Central Line Insertion     Procedure: left internal jugular vein Triple Lumen Catheter placement.    Indications: centrally administered medications and need for frequent blood draws    Consent: The family members (Brother Vicente) were counseled regarding the procedure, its indications, risks, potential complications and alternatives, and any questions were answered. Consent was obtained to proceed.    Number of sticks: 1    Number of Kits used: 1    Procedure: Time Out: Immediately prior to the procedure a \"timeout\" was called to verify the correct patient and procedure. The patient was place in the trendelenburg position and the skin over the left internal jugular vein was prepped with betadine and draped in a sterile fashion. Local anesthesia was obtained by infiltration using 1% Lidocaine without epinephrine.  With Ultrasound guidance a large bore needle was used to identify the vein, dark non pulsatile blood returned. The guide wire was then inserted through the needle with minimal resistance. 2 mm nick was made in the skin beside the guidewire. Then a dilator was inserted and removed. A triple lumen catheter was then inserted into the vessel over the guide wire using the Seldinger technique to the 18 cm shun.  All ports showed good, free flowing blood return and were flushed with saline solution.  The catheter was then securely fastened to the skin with sutures and covered with a bio patch and sterile dressing.  A post procedure X-ray was ordered and showed good line position.    Complications: None   The patient tolerated the procedure well.    Estimated blood loss: 1 ml.    Echo Rutledge, HEIDE - CNP   2/16/24  [id number]

## 2024-02-18 LAB
AADO2: 178.4 MMHG
ALBUMIN SERPL-MCNC: 3.4 G/DL (ref 3.5–5.2)
ALP SERPL-CCNC: 133 U/L (ref 40–129)
ALT SERPL-CCNC: 27 U/L (ref 0–40)
ANION GAP SERPL CALCULATED.3IONS-SCNC: 10 MMOL/L (ref 7–16)
ANION GAP SERPL CALCULATED.3IONS-SCNC: 14 MMOL/L (ref 7–16)
AST SERPL-CCNC: 217 U/L (ref 0–39)
B.E.: 2.8 MMOL/L (ref -3–3)
BILIRUB SERPL-MCNC: 8.1 MG/DL (ref 0–1.2)
BUN SERPL-MCNC: 16 MG/DL (ref 6–23)
BUN SERPL-MCNC: 17 MG/DL (ref 6–23)
CALCIUM SERPL-MCNC: 7 MG/DL (ref 8.6–10.2)
CALCIUM SERPL-MCNC: 7.4 MG/DL (ref 8.6–10.2)
CHLORIDE SERPL-SCNC: 95 MMOL/L (ref 98–107)
CHLORIDE SERPL-SCNC: 99 MMOL/L (ref 98–107)
CO2 SERPL-SCNC: 26 MMOL/L (ref 22–29)
CO2 SERPL-SCNC: 26 MMOL/L (ref 22–29)
COHB: 1.1 % (ref 0–1.5)
CREAT SERPL-MCNC: 0.5 MG/DL (ref 0.7–1.2)
CREAT SERPL-MCNC: 0.5 MG/DL (ref 0.7–1.2)
CRITICAL: ABNORMAL
DATE ANALYZED: ABNORMAL
DATE LAST DOSE: NORMAL
DATE OF COLLECTION: ABNORMAL
EKG ATRIAL RATE: 60 BPM
EKG ATRIAL RATE: 66 BPM
EKG ATRIAL RATE: 85 BPM
EKG P AXIS: 32 DEGREES
EKG P AXIS: 48 DEGREES
EKG P AXIS: 56 DEGREES
EKG P-R INTERVAL: 146 MS
EKG P-R INTERVAL: 148 MS
EKG P-R INTERVAL: 148 MS
EKG Q-T INTERVAL: 508 MS
EKG Q-T INTERVAL: 524 MS
EKG Q-T INTERVAL: 714 MS
EKG QRS DURATION: 86 MS
EKG QRS DURATION: 86 MS
EKG QRS DURATION: 98 MS
EKG QTC CALCULATION (BAZETT): 532 MS
EKG QTC CALCULATION (BAZETT): 623 MS
EKG QTC CALCULATION (BAZETT): 714 MS
EKG R AXIS: 16 DEGREES
EKG R AXIS: 2 DEGREES
EKG R AXIS: 6 DEGREES
EKG T AXIS: 58 DEGREES
EKG T AXIS: 71 DEGREES
EKG T AXIS: 77 DEGREES
EKG VENTRICULAR RATE: 60 BPM
EKG VENTRICULAR RATE: 66 BPM
EKG VENTRICULAR RATE: 85 BPM
ERYTHROCYTE [DISTWIDTH] IN BLOOD BY AUTOMATED COUNT: 15.9 % (ref 11.5–15)
FIO2: 40 %
GFR SERPL CREATININE-BSD FRML MDRD: >60 ML/MIN/1.73M2
GFR SERPL CREATININE-BSD FRML MDRD: >60 ML/MIN/1.73M2
GLUCOSE SERPL-MCNC: 125 MG/DL (ref 74–99)
GLUCOSE SERPL-MCNC: 130 MG/DL (ref 74–99)
HCO3: 24.6 MMOL/L (ref 22–26)
HCT VFR BLD AUTO: 27.5 % (ref 37–54)
HGB BLD-MCNC: 9.3 G/DL (ref 12.5–16.5)
HHB: 6.1 % (ref 0–5)
LAB: ABNORMAL
Lab: 508
MAGNESIUM SERPL-MCNC: 1.8 MG/DL (ref 1.6–2.6)
MAGNESIUM SERPL-MCNC: 2.4 MG/DL (ref 1.6–2.6)
MCH RBC QN AUTO: 33 PG (ref 26–35)
MCHC RBC AUTO-ENTMCNC: 33.8 G/DL (ref 32–34.5)
MCV RBC AUTO: 97.5 FL (ref 80–99.9)
METHB: 0 % (ref 0–1.5)
MICROORGANISM SPEC CULT: NORMAL
MICROORGANISM/AGENT SPEC: NORMAL
MODE: AC
O2 CONTENT: 13.9 ML/DL
O2 SATURATION: 93.8 % (ref 92–98.5)
O2HB: 92.8 % (ref 94–97)
OPERATOR ID: 2067
PATIENT TEMP: 37 C
PCO2: 28.7 MMHG (ref 35–45)
PEEP/CPAP: 8 CMH2O
PFO2: 1.6 MMHG/%
PH BLOOD GAS: 7.55 (ref 7.35–7.45)
PHOSPHATE SERPL-MCNC: 1.4 MG/DL (ref 2.5–4.5)
PHOSPHATE SERPL-MCNC: 1.7 MG/DL (ref 2.5–4.5)
PLATELET # BLD AUTO: 35 K/UL (ref 130–450)
PLATELET CONFIRMATION: NORMAL
PMV BLD AUTO: 12.2 FL (ref 7–12)
PO2: 63.8 MMHG (ref 75–100)
POTASSIUM SERPL-SCNC: 3 MMOL/L (ref 3.5–5)
POTASSIUM SERPL-SCNC: 4.2 MMOL/L (ref 3.5–5)
PROT SERPL-MCNC: 6.5 G/DL (ref 6.4–8.3)
RBC # BLD AUTO: 2.82 M/UL (ref 3.8–5.8)
RI(T): 2.8
RR MECHANICAL: 12 B/MIN
SODIUM SERPL-SCNC: 135 MMOL/L (ref 132–146)
SODIUM SERPL-SCNC: 135 MMOL/L (ref 132–146)
SOURCE, BLOOD GAS: ABNORMAL
SPECIMEN DESCRIPTION: NORMAL
THB: 10.6 G/DL (ref 11.5–16.5)
TIME ANALYZED: 511
TME LAST DOSE: NORMAL H
VANCOMYCIN DOSE: NORMAL MG
VANCOMYCIN TROUGH SERPL-MCNC: 9.2 UG/ML (ref 5–16)
VT MECHANICAL: 450 ML
WBC OTHER # BLD: 6.9 K/UL (ref 4.5–11.5)

## 2024-02-18 PROCEDURE — 94799 UNLISTED PULMONARY SVC/PX: CPT

## 2024-02-18 PROCEDURE — 6360000002 HC RX W HCPCS

## 2024-02-18 PROCEDURE — 2500000003 HC RX 250 WO HCPCS

## 2024-02-18 PROCEDURE — 99232 SBSQ HOSP IP/OBS MODERATE 35: CPT | Performed by: INTERNAL MEDICINE

## 2024-02-18 PROCEDURE — 83735 ASSAY OF MAGNESIUM: CPT

## 2024-02-18 PROCEDURE — 82805 BLOOD GASES W/O2 SATURATION: CPT

## 2024-02-18 PROCEDURE — 2580000003 HC RX 258: Performed by: INTERNAL MEDICINE

## 2024-02-18 PROCEDURE — 2580000003 HC RX 258

## 2024-02-18 PROCEDURE — P9047 ALBUMIN (HUMAN), 25%, 50ML: HCPCS

## 2024-02-18 PROCEDURE — 93010 ELECTROCARDIOGRAM REPORT: CPT | Performed by: INTERNAL MEDICINE

## 2024-02-18 PROCEDURE — 80202 ASSAY OF VANCOMYCIN: CPT

## 2024-02-18 PROCEDURE — 80048 BASIC METABOLIC PNL TOTAL CA: CPT

## 2024-02-18 PROCEDURE — 36592 COLLECT BLOOD FROM PICC: CPT

## 2024-02-18 PROCEDURE — 84100 ASSAY OF PHOSPHORUS: CPT

## 2024-02-18 PROCEDURE — 36415 COLL VENOUS BLD VENIPUNCTURE: CPT

## 2024-02-18 PROCEDURE — 6370000000 HC RX 637 (ALT 250 FOR IP): Performed by: INTERNAL MEDICINE

## 2024-02-18 PROCEDURE — 51702 INSERT TEMP BLADDER CATH: CPT

## 2024-02-18 PROCEDURE — 94003 VENT MGMT INPAT SUBQ DAY: CPT

## 2024-02-18 PROCEDURE — 2000000000 HC ICU R&B

## 2024-02-18 PROCEDURE — 93005 ELECTROCARDIOGRAM TRACING: CPT

## 2024-02-18 PROCEDURE — 85027 COMPLETE CBC AUTOMATED: CPT

## 2024-02-18 PROCEDURE — 6360000002 HC RX W HCPCS: Performed by: INTERNAL MEDICINE

## 2024-02-18 PROCEDURE — 99291 CRITICAL CARE FIRST HOUR: CPT | Performed by: INTERNAL MEDICINE

## 2024-02-18 PROCEDURE — 80053 COMPREHEN METABOLIC PANEL: CPT

## 2024-02-18 PROCEDURE — 2700000000 HC OXYGEN THERAPY PER DAY

## 2024-02-18 PROCEDURE — C9113 INJ PANTOPRAZOLE SODIUM, VIA: HCPCS

## 2024-02-18 RX ORDER — MAGNESIUM SULFATE IN WATER 40 MG/ML
2000 INJECTION, SOLUTION INTRAVENOUS ONCE
Status: COMPLETED | OUTPATIENT
Start: 2024-02-18 | End: 2024-02-18

## 2024-02-18 RX ORDER — WATER 10 ML/10ML
INJECTION INTRAMUSCULAR; INTRAVENOUS; SUBCUTANEOUS
Status: COMPLETED
Start: 2024-02-18 | End: 2024-02-18

## 2024-02-18 RX ORDER — POTASSIUM CHLORIDE 29.8 MG/ML
20 INJECTION INTRAVENOUS
Status: COMPLETED | OUTPATIENT
Start: 2024-02-18 | End: 2024-02-18

## 2024-02-18 RX ADMIN — Medication 10 ML: at 19:43

## 2024-02-18 RX ADMIN — AZTREONAM 2000 MG: 2 INJECTION, POWDER, LYOPHILIZED, FOR SOLUTION INTRAMUSCULAR; INTRAVENOUS at 00:18

## 2024-02-18 RX ADMIN — VANCOMYCIN HYDROCHLORIDE 1000 MG: 1 INJECTION, POWDER, LYOPHILIZED, FOR SOLUTION INTRAVENOUS at 08:59

## 2024-02-18 RX ADMIN — WATER 10 ML: 1 INJECTION INTRAMUSCULAR; INTRAVENOUS; SUBCUTANEOUS at 11:13

## 2024-02-18 RX ADMIN — POTASSIUM PHOSPHATE, MONOBASIC AND POTASSIUM PHOSPHATE, DIBASIC 20 MMOL: 224; 236 INJECTION, SOLUTION, CONCENTRATE INTRAVENOUS at 07:37

## 2024-02-18 RX ADMIN — HYDROCORTISONE SODIUM SUCCINATE 50 MG: 100 INJECTION, POWDER, FOR SOLUTION INTRAMUSCULAR; INTRAVENOUS at 02:43

## 2024-02-18 RX ADMIN — LEVETIRACETAM 1500 MG: 100 INJECTION INTRAVENOUS at 13:08

## 2024-02-18 RX ADMIN — SODIUM CHLORIDE, PRESERVATIVE FREE 40 MG: 5 INJECTION INTRAVENOUS at 07:55

## 2024-02-18 RX ADMIN — AZTREONAM 2000 MG: 2 INJECTION, POWDER, LYOPHILIZED, FOR SOLUTION INTRAMUSCULAR; INTRAVENOUS at 06:37

## 2024-02-18 RX ADMIN — POTASSIUM CHLORIDE 20 MEQ: 29.8 INJECTION, SOLUTION INTRAVENOUS at 10:59

## 2024-02-18 RX ADMIN — POTASSIUM CHLORIDE 20 MEQ: 29.8 INJECTION, SOLUTION INTRAVENOUS at 12:05

## 2024-02-18 RX ADMIN — MAGNESIUM SULFATE HEPTAHYDRATE 2000 MG: 40 INJECTION, SOLUTION INTRAVENOUS at 06:24

## 2024-02-18 RX ADMIN — DOPAMINE HYDROCHLORIDE 4 MCG/KG/MIN: 160 INJECTION, SOLUTION INTRAVENOUS at 11:00

## 2024-02-18 RX ADMIN — LEVETIRACETAM 1500 MG: 100 INJECTION INTRAVENOUS at 00:18

## 2024-02-18 RX ADMIN — AZTREONAM 2000 MG: 2 INJECTION, POWDER, LYOPHILIZED, FOR SOLUTION INTRAMUSCULAR; INTRAVENOUS at 23:35

## 2024-02-18 RX ADMIN — LEVETIRACETAM 1500 MG: 100 INJECTION INTRAVENOUS at 23:52

## 2024-02-18 RX ADMIN — HYDROCORTISONE SODIUM SUCCINATE 50 MG: 100 INJECTION, POWDER, FOR SOLUTION INTRAMUSCULAR; INTRAVENOUS at 11:13

## 2024-02-18 RX ADMIN — ALBUMIN (HUMAN) 25 G: 0.25 INJECTION, SOLUTION INTRAVENOUS at 00:29

## 2024-02-18 RX ADMIN — AZTREONAM 2000 MG: 2 INJECTION, POWDER, LYOPHILIZED, FOR SOLUTION INTRAMUSCULAR; INTRAVENOUS at 14:25

## 2024-02-18 RX ADMIN — VANCOMYCIN HYDROCHLORIDE 1250 MG: 10 INJECTION, POWDER, LYOPHILIZED, FOR SOLUTION INTRAVENOUS at 20:30

## 2024-02-18 RX ADMIN — POTASSIUM BICARBONATE 40 MEQ: 782 TABLET, EFFERVESCENT ORAL at 10:58

## 2024-02-18 RX ADMIN — SODIUM CHLORIDE, POTASSIUM CHLORIDE, SODIUM LACTATE AND CALCIUM CHLORIDE: 600; 310; 30; 20 INJECTION, SOLUTION INTRAVENOUS at 07:38

## 2024-02-18 RX ADMIN — HYDROCORTISONE SODIUM SUCCINATE 50 MG: 100 INJECTION, POWDER, FOR SOLUTION INTRAMUSCULAR; INTRAVENOUS at 19:42

## 2024-02-18 RX ADMIN — MAGNESIUM SULFATE HEPTAHYDRATE 2000 MG: 40 INJECTION, SOLUTION INTRAVENOUS at 11:51

## 2024-02-18 RX ADMIN — Medication 10 ML: at 07:40

## 2024-02-18 RX ADMIN — SODIUM CHLORIDE, POTASSIUM CHLORIDE, SODIUM LACTATE AND CALCIUM CHLORIDE: 600; 310; 30; 20 INJECTION, SOLUTION INTRAVENOUS at 23:33

## 2024-02-18 ASSESSMENT — PULMONARY FUNCTION TESTS
PIF_VALUE: 27
PIF_VALUE: 28
PIF_VALUE: 28
PIF_VALUE: 26
PIF_VALUE: 26
PIF_VALUE: 21
PIF_VALUE: 16
PIF_VALUE: 28
PIF_VALUE: 25
PIF_VALUE: 29
PIF_VALUE: 22
PIF_VALUE: 24
PIF_VALUE: 17
PIF_VALUE: 20
PIF_VALUE: 21
PIF_VALUE: 27
PIF_VALUE: 17
PIF_VALUE: 25
PIF_VALUE: 20
PIF_VALUE: 31
PIF_VALUE: 29

## 2024-02-18 ASSESSMENT — PAIN SCALES - GENERAL
PAINLEVEL_OUTOF10: 0

## 2024-02-19 ENCOUNTER — APPOINTMENT (OUTPATIENT)
Age: 69
DRG: 981 | End: 2024-02-19
Payer: COMMERCIAL

## 2024-02-19 ENCOUNTER — APPOINTMENT (OUTPATIENT)
Dept: NEUROLOGY | Age: 69
DRG: 981 | End: 2024-02-19
Payer: COMMERCIAL

## 2024-02-19 PROBLEM — R56.9 SEIZURE-LIKE ACTIVITY (HCC): Status: ACTIVE | Noted: 2024-02-19

## 2024-02-19 PROBLEM — Z87.19 HISTORY OF CIRRHOSIS: Status: ACTIVE | Noted: 2024-02-19

## 2024-02-19 LAB
ALBUMIN SERPL-MCNC: 3 G/DL (ref 3.5–5.2)
ALP SERPL-CCNC: 132 U/L (ref 40–129)
ALT SERPL-CCNC: 28 U/L (ref 0–40)
ANION GAP SERPL CALCULATED.3IONS-SCNC: 11 MMOL/L (ref 7–16)
ANION GAP SERPL CALCULATED.3IONS-SCNC: 11 MMOL/L (ref 7–16)
AST SERPL-CCNC: 208 U/L (ref 0–39)
BILIRUB SERPL-MCNC: 6.4 MG/DL (ref 0–1.2)
BUN SERPL-MCNC: 15 MG/DL (ref 6–23)
BUN SERPL-MCNC: 16 MG/DL (ref 6–23)
CALCIUM SERPL-MCNC: 6.9 MG/DL (ref 8.6–10.2)
CALCIUM SERPL-MCNC: 7.1 MG/DL (ref 8.6–10.2)
CHLORIDE SERPL-SCNC: 100 MMOL/L (ref 98–107)
CHLORIDE SERPL-SCNC: 102 MMOL/L (ref 98–107)
CO2 SERPL-SCNC: 25 MMOL/L (ref 22–29)
CO2 SERPL-SCNC: 26 MMOL/L (ref 22–29)
CREAT SERPL-MCNC: 0.5 MG/DL (ref 0.7–1.2)
CREAT SERPL-MCNC: 0.5 MG/DL (ref 0.7–1.2)
ECHO BSA: 1.91 M2
ECHO EST RA PRESSURE: 3 MMHG
ECHO LA DIAMETER INDEX: 1.83 CM/M2
ECHO LA DIAMETER: 3.7 CM
ECHO LA VOL A-L A2C: 50 ML (ref 18–58)
ECHO LA VOL A-L A4C: 56 ML (ref 18–58)
ECHO LA VOL MOD A2C: 50 ML (ref 18–58)
ECHO LA VOL MOD A4C: 51 ML (ref 18–58)
ECHO LA VOLUME AREA LENGTH: 57 ML
ECHO LA VOLUME INDEX A-L A2C: 25 ML/M2 (ref 16–34)
ECHO LA VOLUME INDEX A-L A4C: 28 ML/M2 (ref 16–34)
ECHO LA VOLUME INDEX AREA LENGTH: 28 ML/M2 (ref 16–34)
ECHO LA VOLUME INDEX MOD A2C: 25 ML/M2 (ref 16–34)
ECHO LA VOLUME INDEX MOD A4C: 25 ML/M2 (ref 16–34)
ECHO LV EJECTION FRACTION A2C: 31 %
ECHO LV EJECTION FRACTION A4C: 34 %
ECHO LV FRACTIONAL SHORTENING: 15 % (ref 28–44)
ECHO LV INTERNAL DIMENSION DIASTOLE INDEX: 2.57 CM/M2
ECHO LV INTERNAL DIMENSION DIASTOLIC: 5.2 CM (ref 4.2–5.9)
ECHO LV INTERNAL DIMENSION SYSTOLIC INDEX: 2.18 CM/M2
ECHO LV INTERNAL DIMENSION SYSTOLIC: 4.4 CM
ECHO LV IVSD: 1 CM (ref 0.6–1)
ECHO LV MASS 2D: 194.2 G (ref 88–224)
ECHO LV MASS INDEX 2D: 96.1 G/M2 (ref 49–115)
ECHO LV POSTERIOR WALL DIASTOLIC: 1 CM (ref 0.6–1)
ECHO LV RELATIVE WALL THICKNESS RATIO: 0.38
ECHO MV A VELOCITY: 0.74 M/S
ECHO MV E DECELERATION TIME (DT): 154.6 MS
ECHO MV E VELOCITY: 1.03 M/S
ECHO MV E/A RATIO: 1.39
ECHO RIGHT VENTRICULAR SYSTOLIC PRESSURE (RVSP): 40 MMHG
ECHO RV INTERNAL DIMENSION: 3.1 CM
ECHO TV REGURGITANT MAX VELOCITY: 3.06 M/S
ECHO TV REGURGITANT PEAK GRADIENT: 37 MMHG
ERYTHROCYTE [DISTWIDTH] IN BLOOD BY AUTOMATED COUNT: 16 % (ref 11.5–15)
GFR SERPL CREATININE-BSD FRML MDRD: >60 ML/MIN/1.73M2
GFR SERPL CREATININE-BSD FRML MDRD: >60 ML/MIN/1.73M2
GLUCOSE SERPL-MCNC: 116 MG/DL (ref 74–99)
GLUCOSE SERPL-MCNC: 122 MG/DL (ref 74–99)
HCT VFR BLD AUTO: 29.8 % (ref 37–54)
HGB BLD-MCNC: 10.1 G/DL (ref 12.5–16.5)
MAGNESIUM SERPL-MCNC: 1.9 MG/DL (ref 1.6–2.6)
MAGNESIUM SERPL-MCNC: 2.2 MG/DL (ref 1.6–2.6)
MCH RBC QN AUTO: 33.7 PG (ref 26–35)
MCHC RBC AUTO-ENTMCNC: 33.9 G/DL (ref 32–34.5)
MCV RBC AUTO: 99.3 FL (ref 80–99.9)
PHOSPHATE SERPL-MCNC: 1.4 MG/DL (ref 2.5–4.5)
PHOSPHATE SERPL-MCNC: 1.7 MG/DL (ref 2.5–4.5)
PLATELET CONFIRMATION: NORMAL
PLATELET, FLUORESCENCE: 41 K/UL (ref 130–450)
PMV BLD AUTO: 11.7 FL (ref 7–12)
POTASSIUM SERPL-SCNC: 3.4 MMOL/L (ref 3.5–5)
POTASSIUM SERPL-SCNC: 3.7 MMOL/L (ref 3.5–5)
PROT SERPL-MCNC: 5.9 G/DL (ref 6.4–8.3)
RBC # BLD AUTO: 3 M/UL (ref 3.8–5.8)
SODIUM SERPL-SCNC: 136 MMOL/L (ref 132–146)
SODIUM SERPL-SCNC: 139 MMOL/L (ref 132–146)
VANCOMYCIN SERPL-MCNC: 10 UG/ML (ref 5–40)
WBC OTHER # BLD: 6 K/UL (ref 4.5–11.5)

## 2024-02-19 PROCEDURE — 99222 1ST HOSP IP/OBS MODERATE 55: CPT | Performed by: PSYCHIATRY & NEUROLOGY

## 2024-02-19 PROCEDURE — 2500000003 HC RX 250 WO HCPCS

## 2024-02-19 PROCEDURE — 2580000003 HC RX 258: Performed by: INTERNAL MEDICINE

## 2024-02-19 PROCEDURE — 83735 ASSAY OF MAGNESIUM: CPT

## 2024-02-19 PROCEDURE — 93005 ELECTROCARDIOGRAM TRACING: CPT

## 2024-02-19 PROCEDURE — 2000000000 HC ICU R&B

## 2024-02-19 PROCEDURE — 99232 SBSQ HOSP IP/OBS MODERATE 35: CPT

## 2024-02-19 PROCEDURE — 80202 ASSAY OF VANCOMYCIN: CPT

## 2024-02-19 PROCEDURE — 6360000002 HC RX W HCPCS: Performed by: INTERNAL MEDICINE

## 2024-02-19 PROCEDURE — 99232 SBSQ HOSP IP/OBS MODERATE 35: CPT | Performed by: INTERNAL MEDICINE

## 2024-02-19 PROCEDURE — 93321 DOPPLER ECHO F-UP/LMTD STD: CPT | Performed by: INTERNAL MEDICINE

## 2024-02-19 PROCEDURE — 2580000003 HC RX 258

## 2024-02-19 PROCEDURE — 99291 CRITICAL CARE FIRST HOUR: CPT | Performed by: STUDENT IN AN ORGANIZED HEALTH CARE EDUCATION/TRAINING PROGRAM

## 2024-02-19 PROCEDURE — 84100 ASSAY OF PHOSPHORUS: CPT

## 2024-02-19 PROCEDURE — 6360000002 HC RX W HCPCS

## 2024-02-19 PROCEDURE — 93321 DOPPLER ECHO F-UP/LMTD STD: CPT

## 2024-02-19 PROCEDURE — 93325 DOPPLER ECHO COLOR FLOW MAPG: CPT | Performed by: INTERNAL MEDICINE

## 2024-02-19 PROCEDURE — C9113 INJ PANTOPRAZOLE SODIUM, VIA: HCPCS

## 2024-02-19 PROCEDURE — 80053 COMPREHEN METABOLIC PANEL: CPT

## 2024-02-19 PROCEDURE — 93308 TTE F-UP OR LMTD: CPT | Performed by: INTERNAL MEDICINE

## 2024-02-19 PROCEDURE — 80048 BASIC METABOLIC PNL TOTAL CA: CPT

## 2024-02-19 PROCEDURE — 95819 EEG AWAKE AND ASLEEP: CPT

## 2024-02-19 PROCEDURE — 85027 COMPLETE CBC AUTOMATED: CPT

## 2024-02-19 PROCEDURE — 2700000000 HC OXYGEN THERAPY PER DAY

## 2024-02-19 RX ORDER — POTASSIUM CHLORIDE 29.8 MG/ML
20 INJECTION INTRAVENOUS ONCE
Status: COMPLETED | OUTPATIENT
Start: 2024-02-19 | End: 2024-02-19

## 2024-02-19 RX ORDER — MAGNESIUM SULFATE IN WATER 40 MG/ML
2000 INJECTION, SOLUTION INTRAVENOUS ONCE
Status: DISCONTINUED | OUTPATIENT
Start: 2024-02-19 | End: 2024-02-20

## 2024-02-19 RX ADMIN — SODIUM CHLORIDE, POTASSIUM CHLORIDE, SODIUM LACTATE AND CALCIUM CHLORIDE: 600; 310; 30; 20 INJECTION, SOLUTION INTRAVENOUS at 22:45

## 2024-02-19 RX ADMIN — VANCOMYCIN HYDROCHLORIDE 1250 MG: 10 INJECTION, POWDER, LYOPHILIZED, FOR SOLUTION INTRAVENOUS at 08:41

## 2024-02-19 RX ADMIN — POTASSIUM CHLORIDE 20 MEQ: 29.8 INJECTION, SOLUTION INTRAVENOUS at 10:26

## 2024-02-19 RX ADMIN — POTASSIUM CHLORIDE 20 MEQ: 29.8 INJECTION, SOLUTION INTRAVENOUS at 07:01

## 2024-02-19 RX ADMIN — AZTREONAM 2000 MG: 2 INJECTION, POWDER, LYOPHILIZED, FOR SOLUTION INTRAMUSCULAR; INTRAVENOUS at 08:08

## 2024-02-19 RX ADMIN — WATER 25 MG: 1 INJECTION INTRAMUSCULAR; INTRAVENOUS; SUBCUTANEOUS at 23:35

## 2024-02-19 RX ADMIN — LEVETIRACETAM 1500 MG: 100 INJECTION INTRAVENOUS at 23:34

## 2024-02-19 RX ADMIN — SODIUM CHLORIDE, PRESERVATIVE FREE 40 MG: 5 INJECTION INTRAVENOUS at 08:23

## 2024-02-19 RX ADMIN — AZTREONAM 2000 MG: 2 INJECTION, POWDER, LYOPHILIZED, FOR SOLUTION INTRAMUSCULAR; INTRAVENOUS at 23:42

## 2024-02-19 RX ADMIN — HYDROCORTISONE SODIUM SUCCINATE 50 MG: 100 INJECTION, POWDER, FOR SOLUTION INTRAMUSCULAR; INTRAVENOUS at 03:00

## 2024-02-19 RX ADMIN — AZTREONAM 2000 MG: 2 INJECTION, POWDER, LYOPHILIZED, FOR SOLUTION INTRAMUSCULAR; INTRAVENOUS at 15:33

## 2024-02-19 RX ADMIN — SODIUM CHLORIDE, PRESERVATIVE FREE 10 ML: 5 INJECTION INTRAVENOUS at 08:10

## 2024-02-19 RX ADMIN — LEVETIRACETAM 1500 MG: 100 INJECTION INTRAVENOUS at 11:46

## 2024-02-19 RX ADMIN — Medication 10 ML: at 21:42

## 2024-02-19 RX ADMIN — WATER 25 MG: 1 INJECTION INTRAMUSCULAR; INTRAVENOUS; SUBCUTANEOUS at 10:23

## 2024-02-19 RX ADMIN — MAGNESIUM SULFATE HEPTAHYDRATE 2000 MG: 40 INJECTION, SOLUTION INTRAVENOUS at 07:03

## 2024-02-19 RX ADMIN — Medication 10 ML: at 08:11

## 2024-02-19 RX ADMIN — POTASSIUM PHOSPHATE, MONOBASIC AND POTASSIUM PHOSPHATE, DIBASIC 20 MMOL: 224; 236 INJECTION, SOLUTION, CONCENTRATE INTRAVENOUS at 08:09

## 2024-02-19 ASSESSMENT — PAIN SCALES - GENERAL
PAINLEVEL_OUTOF10: 0

## 2024-02-19 NOTE — PROCEDURES
[facility] Neurodiagnostic Report    MRN: [id number]  PATIENT NAME: Shahzad Rodgers  DATE OF REPORT: 2024    DATE OF SERVICE: 2024  PHYSICIAN NAME: Harrison Yan DO  Referring Physician: Dr Rutledge      Patient's : 1955  Patient's Age: 68 y.o.  Gender: male    PROCEDURE: Routine EEG with video      Clinical Interpretation:   This was a normal study during waking and drowsiness.    No seizures or epileptiform discharges were noted during this study.      ____________________________  Electronically signed by: Harrison Yan DO, 2024 11:03 AM      Patient Clinical Information   Reason for Study: Patient undergoing evaluation for new onset seizures  Patient State: Somnolent  Primary neurological diagnosis: Seizure  Primary indication for monitoring: Risk stratification    Pertinent Medications and Treatments    fentanyl     levetiracetam     midazolam     Sedatives administered: No  Intubated: No  Pharmacological paralytic: No    Reporting Period  Start of Study: , 2024   End of Study:  104, 2024       EEG Description  Digital video and scalp EEG monitoring was performed using the standard protocol for this laboratory. Scalp electrodes were applied in the international 10/20 system. Multiple digital montage arrangements were utilized for evaluation. EKG and video were recorded.     Background:      Occipital rhythm (posterior dominant rhythm or PDR): Present  Frequency: 8.5 Hz  Voltage: Low  Organization: fair   Reactivity to eye opening/closure: fair     Drowsiness: Present - normal  Sleep: Absent    Technical and Activation Procedures:  Hyperventilation: Not done  Photic stimulation: Not done  Reactivity to stimulation: Yes    Abnormalities:    I. Seizures?  No    II. Rhythmic or Periodic Patterns?  No    III. Other Abnormalities?  No

## 2024-02-19 NOTE — CARE COORDINATION
Care Coordination:  LOS 14-day Code Blue/Vfib form 84 on 2/16/24. Extubated 2/18/24. EP following. Restraints, Dopamine, Azactam, LR@125, Vanc Room air.  EEG completed.  Per previous CM, pt lives at home alone, no plan in place for transition of care prior to transfer.  Would benefit from therapy evals when medically appropriate. Attempted to meet with pt, confused. Call to Brother Vicente. States pt was living alone independently, his friend Benito is there often during the day and is transports him to where he needs to go. Discussed SW notes stating he was weaking and falling at home and may become weaker during this stay. He understands, he may need ROWAN, but he does not want to make decision for his brother.  Feels pt's confusion is due to infection, as he is normally A&O.  He would like to wait until patient is able to make his own decision regarding home vs ROWAN. Will follow.    Electronically signed by Claudia Bucio RN on 2/19/2024 at 11:57 AM

## 2024-02-20 ENCOUNTER — APPOINTMENT (OUTPATIENT)
Dept: GENERAL RADIOLOGY | Age: 69
DRG: 981 | End: 2024-02-20
Payer: COMMERCIAL

## 2024-02-20 LAB
25(OH)D3 SERPL-MCNC: 6.9 NG/ML (ref 30–100)
ALBUMIN SERPL-MCNC: 2.8 G/DL (ref 3.5–5.2)
ALP SERPL-CCNC: 145 U/L (ref 40–129)
ALT SERPL-CCNC: 34 U/L (ref 0–40)
ANION GAP SERPL CALCULATED.3IONS-SCNC: 15 MMOL/L (ref 7–16)
AST SERPL-CCNC: 223 U/L (ref 0–39)
BILIRUB SERPL-MCNC: 7.7 MG/DL (ref 0–1.2)
BUN SERPL-MCNC: 18 MG/DL (ref 6–23)
CALCIUM SERPL-MCNC: 7.2 MG/DL (ref 8.6–10.2)
CHLORIDE SERPL-SCNC: 102 MMOL/L (ref 98–107)
CO2 SERPL-SCNC: 23 MMOL/L (ref 22–29)
CREAT SERPL-MCNC: 0.5 MG/DL (ref 0.7–1.2)
EKG ATRIAL RATE: 65 BPM
EKG ATRIAL RATE: 67 BPM
EKG P AXIS: 33 DEGREES
EKG P AXIS: 52 DEGREES
EKG P-R INTERVAL: 140 MS
EKG P-R INTERVAL: 144 MS
EKG Q-T INTERVAL: 526 MS
EKG Q-T INTERVAL: 536 MS
EKG QRS DURATION: 88 MS
EKG QRS DURATION: 94 MS
EKG QTC CALCULATION (BAZETT): 547 MS
EKG QTC CALCULATION (BAZETT): 566 MS
EKG R AXIS: 1 DEGREES
EKG R AXIS: 55 DEGREES
EKG T AXIS: 56 DEGREES
EKG T AXIS: 84 DEGREES
EKG VENTRICULAR RATE: 65 BPM
EKG VENTRICULAR RATE: 67 BPM
ERYTHROCYTE [DISTWIDTH] IN BLOOD BY AUTOMATED COUNT: 16.6 % (ref 11.5–15)
GFR SERPL CREATININE-BSD FRML MDRD: >60 ML/MIN/1.73M2
GLUCOSE SERPL-MCNC: 109 MG/DL (ref 74–99)
HCT VFR BLD AUTO: 33.8 % (ref 37–54)
HGB BLD-MCNC: 11.4 G/DL (ref 12.5–16.5)
MAGNESIUM SERPL-MCNC: 1.9 MG/DL (ref 1.6–2.6)
MCH RBC QN AUTO: 33.5 PG (ref 26–35)
MCHC RBC AUTO-ENTMCNC: 33.7 G/DL (ref 32–34.5)
MCV RBC AUTO: 99.4 FL (ref 80–99.9)
PHOSPHATE SERPL-MCNC: 1.2 MG/DL (ref 2.5–4.5)
PLATELET CONFIRMATION: NORMAL
PLATELET, FLUORESCENCE: 62 K/UL (ref 130–450)
PMV BLD AUTO: 11.3 FL (ref 7–12)
POTASSIUM SERPL-SCNC: 3.5 MMOL/L (ref 3.5–5)
PROT SERPL-MCNC: 6 G/DL (ref 6.4–8.3)
RBC # BLD AUTO: 3.4 M/UL (ref 3.8–5.8)
SODIUM SERPL-SCNC: 140 MMOL/L (ref 132–146)
WBC OTHER # BLD: 8.3 K/UL (ref 4.5–11.5)

## 2024-02-20 PROCEDURE — 2580000003 HC RX 258

## 2024-02-20 PROCEDURE — 6360000002 HC RX W HCPCS: Performed by: NURSE PRACTITIONER

## 2024-02-20 PROCEDURE — 2060000000 HC ICU INTERMEDIATE R&B

## 2024-02-20 PROCEDURE — 85027 COMPLETE CBC AUTOMATED: CPT

## 2024-02-20 PROCEDURE — 6360000002 HC RX W HCPCS: Performed by: INTERNAL MEDICINE

## 2024-02-20 PROCEDURE — 2580000003 HC RX 258: Performed by: INTERNAL MEDICINE

## 2024-02-20 PROCEDURE — 84100 ASSAY OF PHOSPHORUS: CPT

## 2024-02-20 PROCEDURE — 82306 VITAMIN D 25 HYDROXY: CPT

## 2024-02-20 PROCEDURE — 99291 CRITICAL CARE FIRST HOUR: CPT | Performed by: STUDENT IN AN ORGANIZED HEALTH CARE EDUCATION/TRAINING PROGRAM

## 2024-02-20 PROCEDURE — 83735 ASSAY OF MAGNESIUM: CPT

## 2024-02-20 PROCEDURE — 2580000003 HC RX 258: Performed by: NURSE PRACTITIONER

## 2024-02-20 PROCEDURE — A4216 STERILE WATER/SALINE, 10 ML: HCPCS

## 2024-02-20 PROCEDURE — 51798 US URINE CAPACITY MEASURE: CPT

## 2024-02-20 PROCEDURE — 2700000000 HC OXYGEN THERAPY PER DAY

## 2024-02-20 PROCEDURE — 80053 COMPREHEN METABOLIC PANEL: CPT

## 2024-02-20 PROCEDURE — C9113 INJ PANTOPRAZOLE SODIUM, VIA: HCPCS

## 2024-02-20 PROCEDURE — 71045 X-RAY EXAM CHEST 1 VIEW: CPT

## 2024-02-20 PROCEDURE — 2500000003 HC RX 250 WO HCPCS: Performed by: NURSE PRACTITIONER

## 2024-02-20 PROCEDURE — 93005 ELECTROCARDIOGRAM TRACING: CPT

## 2024-02-20 PROCEDURE — 99232 SBSQ HOSP IP/OBS MODERATE 35: CPT | Performed by: INTERNAL MEDICINE

## 2024-02-20 PROCEDURE — 6360000002 HC RX W HCPCS

## 2024-02-20 PROCEDURE — 99239 HOSP IP/OBS DSCHRG MGMT >30: CPT | Performed by: FAMILY MEDICINE

## 2024-02-20 PROCEDURE — 99232 SBSQ HOSP IP/OBS MODERATE 35: CPT | Performed by: NURSE PRACTITIONER

## 2024-02-20 RX ORDER — LIDOCAINE HYDROCHLORIDE 20 MG/ML
JELLY TOPICAL PRN
Status: DISCONTINUED | OUTPATIENT
Start: 2024-02-20 | End: 2024-02-29 | Stop reason: HOSPADM

## 2024-02-20 RX ORDER — CALCIUM GLUCONATE 10 MG/ML
1000 INJECTION, SOLUTION INTRAVENOUS ONCE
Status: COMPLETED | OUTPATIENT
Start: 2024-02-20 | End: 2024-02-20

## 2024-02-20 RX ORDER — MAGNESIUM SULFATE 1 G/100ML
1000 INJECTION INTRAVENOUS ONCE
Status: COMPLETED | OUTPATIENT
Start: 2024-02-20 | End: 2024-02-20

## 2024-02-20 RX ORDER — POTASSIUM CHLORIDE 7.45 MG/ML
10 INJECTION INTRAVENOUS PRN
Status: DISCONTINUED | OUTPATIENT
Start: 2024-02-20 | End: 2024-02-29 | Stop reason: HOSPADM

## 2024-02-20 RX ORDER — POTASSIUM CHLORIDE 20 MEQ/1
40 TABLET, EXTENDED RELEASE ORAL PRN
Status: DISCONTINUED | OUTPATIENT
Start: 2024-02-20 | End: 2024-02-29 | Stop reason: HOSPADM

## 2024-02-20 RX ADMIN — POTASSIUM PHOSPHATE, MONOBASIC AND POTASSIUM PHOSPHATE, DIBASIC 15 MMOL: 224; 236 INJECTION, SOLUTION, CONCENTRATE INTRAVENOUS at 06:09

## 2024-02-20 RX ADMIN — LEVETIRACETAM 1500 MG: 100 INJECTION INTRAVENOUS at 23:21

## 2024-02-20 RX ADMIN — WATER 25 MG: 1 INJECTION INTRAMUSCULAR; INTRAVENOUS; SUBCUTANEOUS at 20:43

## 2024-02-20 RX ADMIN — AZTREONAM 2000 MG: 2 INJECTION, POWDER, LYOPHILIZED, FOR SOLUTION INTRAMUSCULAR; INTRAVENOUS at 22:11

## 2024-02-20 RX ADMIN — CALCIUM GLUCONATE 1000 MG: 10 INJECTION, SOLUTION INTRAVENOUS at 06:17

## 2024-02-20 RX ADMIN — MAGNESIUM SULFATE HEPTAHYDRATE 1000 MG: 1 INJECTION, SOLUTION INTRAVENOUS at 06:14

## 2024-02-20 RX ADMIN — MAGNESIUM SULFATE HEPTAHYDRATE 1000 MG: 1 INJECTION, SOLUTION INTRAVENOUS at 10:37

## 2024-02-20 RX ADMIN — SODIUM CHLORIDE, PRESERVATIVE FREE 40 MG: 5 INJECTION INTRAVENOUS at 09:27

## 2024-02-20 RX ADMIN — AZTREONAM 2000 MG: 2 INJECTION, POWDER, LYOPHILIZED, FOR SOLUTION INTRAMUSCULAR; INTRAVENOUS at 09:31

## 2024-02-20 RX ADMIN — Medication 10 ML: at 20:44

## 2024-02-20 RX ADMIN — AZTREONAM 2000 MG: 2 INJECTION, POWDER, LYOPHILIZED, FOR SOLUTION INTRAMUSCULAR; INTRAVENOUS at 14:51

## 2024-02-20 RX ADMIN — WATER 25 MG: 1 INJECTION INTRAMUSCULAR; INTRAVENOUS; SUBCUTANEOUS at 09:25

## 2024-02-20 RX ADMIN — LEVETIRACETAM 1500 MG: 100 INJECTION INTRAVENOUS at 12:21

## 2024-02-20 ASSESSMENT — PAIN SCALES - GENERAL
PAINLEVEL_OUTOF10: 0

## 2024-02-20 NOTE — DISCHARGE INSTRUCTIONS
PT HAS A LIFE VEST. HE NEEDS TO KEEP IT ON 23 HOURS OUT OF 24 HOURS A DAY, ONLY TO TAKE OFF DURING SHOWER.  Please see attached MAR report for med administration      It is important to continue to try and achieve seizure control because of the potential for injury and illness due to seizures. In a very small minority of patients with generalized tonic clonic seizures (\"grand mal\"), breathing or heart function can stop during a seizure and result in demise (sudden unexpected death in epilepsy or SUDEP). [place] from seizures prevents this kind of outcome.     Please follow seizure precautions:  Please do not engage in any high risk activities such as, but not limited to, driving, bathing in tubs, swimming alone, climbing ladders, working at heights, near open flames or machinery with moving parts etc.  For patients with epilepsy it is recommended to stay seizure free for 6 months on medication before resume driving.     These will be re-assessed at your next appointment.  Cardiac Rehabilitation: Discharge instructions        Cardiac rehabilitation is a program for people who have a heart problem, such as a heart attack, coronary stent placed, heart failure, or a heart valve disease. The program includes exercise, lifestyle changes, education, and emotional support. Cardiac rehab can help you improve the quality of your life through better overall health. It can help you lose weight and feel better about yourself.    On your cardiac rehab team, you may have your doctor, a nurse specialist, an exercise physiologist, and a dietitian. They will design your cardiac rehab program specifically for you. You will learn how to reduce your risk for heart problems, how to manage stress, and how to eat a heart-healthy diet. By the end of the program, you will be ready to maintain a healthier lifestyle on your own.    Follow-up care is a key part of your treatment and safety. Be sure to make and go to all appointments, and call

## 2024-02-20 NOTE — FLOWSHEET NOTE
Pt is impulsive with poor judgement. Pulls at lines and external cath tubing- redirected without evidence of learning. Pt hallucinating seeing money in the ceiling and jello in his bed. Reoriented to place time and situation. Pt continues to try to pull restraints off states wants to get oob to get a shower and to go to the cafeteria. Supportive care given will assess.

## 2024-02-20 NOTE — PROCEDURES
MRI remains on hold, 1230pm Rn notified Gunjan Peck would need to sign MRI screening form waiver for MRI to be done.   Patient cannot answer MRI screening questions, no family available to do so according to last unit RN.

## 2024-02-21 PROBLEM — R41.82 ALTERED MENTAL STATUS: Status: ACTIVE | Noted: 2024-02-21

## 2024-02-21 PROBLEM — I47.29 POLYMORPHIC VENTRICULAR TACHYCARDIA (HCC): Status: ACTIVE | Noted: 2024-02-21

## 2024-02-21 PROBLEM — I42.9 CARDIOMYOPATHY (HCC): Status: ACTIVE | Noted: 2024-02-21

## 2024-02-21 LAB
ANION GAP SERPL CALCULATED.3IONS-SCNC: 9 MMOL/L (ref 7–16)
BUN SERPL-MCNC: 19 MG/DL (ref 6–23)
CALCIUM SERPL-MCNC: 7.2 MG/DL (ref 8.6–10.2)
CHLORIDE SERPL-SCNC: 101 MMOL/L (ref 98–107)
CO2 SERPL-SCNC: 26 MMOL/L (ref 22–29)
CREAT SERPL-MCNC: 0.6 MG/DL (ref 0.7–1.2)
GFR SERPL CREATININE-BSD FRML MDRD: >60 ML/MIN/1.73M2
GLUCOSE SERPL-MCNC: 116 MG/DL (ref 74–99)
MAGNESIUM SERPL-MCNC: 1.9 MG/DL (ref 1.6–2.6)
POTASSIUM SERPL-SCNC: 3.2 MMOL/L (ref 3.5–5)
SODIUM SERPL-SCNC: 136 MMOL/L (ref 132–146)

## 2024-02-21 PROCEDURE — 6360000002 HC RX W HCPCS: Performed by: FAMILY MEDICINE

## 2024-02-21 PROCEDURE — 6360000002 HC RX W HCPCS: Performed by: INTERNAL MEDICINE

## 2024-02-21 PROCEDURE — 97535 SELF CARE MNGMENT TRAINING: CPT

## 2024-02-21 PROCEDURE — 6360000002 HC RX W HCPCS

## 2024-02-21 PROCEDURE — 2580000003 HC RX 258: Performed by: INTERNAL MEDICINE

## 2024-02-21 PROCEDURE — 99232 SBSQ HOSP IP/OBS MODERATE 35: CPT | Performed by: INTERNAL MEDICINE

## 2024-02-21 PROCEDURE — 36415 COLL VENOUS BLD VENIPUNCTURE: CPT

## 2024-02-21 PROCEDURE — 97166 OT EVAL MOD COMPLEX 45 MIN: CPT

## 2024-02-21 PROCEDURE — 2700000000 HC OXYGEN THERAPY PER DAY

## 2024-02-21 PROCEDURE — A4216 STERILE WATER/SALINE, 10 ML: HCPCS

## 2024-02-21 PROCEDURE — 6370000000 HC RX 637 (ALT 250 FOR IP): Performed by: FAMILY MEDICINE

## 2024-02-21 PROCEDURE — 97161 PT EVAL LOW COMPLEX 20 MIN: CPT

## 2024-02-21 PROCEDURE — 6370000000 HC RX 637 (ALT 250 FOR IP): Performed by: NURSE PRACTITIONER

## 2024-02-21 PROCEDURE — 6370000000 HC RX 637 (ALT 250 FOR IP): Performed by: STUDENT IN AN ORGANIZED HEALTH CARE EDUCATION/TRAINING PROGRAM

## 2024-02-21 PROCEDURE — C9113 INJ PANTOPRAZOLE SODIUM, VIA: HCPCS

## 2024-02-21 PROCEDURE — 99232 SBSQ HOSP IP/OBS MODERATE 35: CPT | Performed by: FAMILY MEDICINE

## 2024-02-21 PROCEDURE — 80048 BASIC METABOLIC PNL TOTAL CA: CPT

## 2024-02-21 PROCEDURE — 2580000003 HC RX 258

## 2024-02-21 PROCEDURE — 97530 THERAPEUTIC ACTIVITIES: CPT

## 2024-02-21 PROCEDURE — 2060000000 HC ICU INTERMEDIATE R&B

## 2024-02-21 PROCEDURE — 83735 ASSAY OF MAGNESIUM: CPT

## 2024-02-21 PROCEDURE — 6370000000 HC RX 637 (ALT 250 FOR IP): Performed by: INTERNAL MEDICINE

## 2024-02-21 RX ORDER — PROCHLORPERAZINE EDISYLATE 5 MG/ML
10 INJECTION INTRAMUSCULAR; INTRAVENOUS EVERY 6 HOURS PRN
Status: DISCONTINUED | OUTPATIENT
Start: 2024-02-21 | End: 2024-02-29 | Stop reason: HOSPADM

## 2024-02-21 RX ORDER — POTASSIUM CHLORIDE 7.45 MG/ML
10 INJECTION INTRAVENOUS
Status: COMPLETED | OUTPATIENT
Start: 2024-02-21 | End: 2024-02-21

## 2024-02-21 RX ADMIN — Medication 10 ML: at 10:17

## 2024-02-21 RX ADMIN — LEVETIRACETAM 1500 MG: 100 INJECTION INTRAVENOUS at 12:02

## 2024-02-21 RX ADMIN — POTASSIUM CHLORIDE 40 MEQ: 1500 TABLET, EXTENDED RELEASE ORAL at 12:02

## 2024-02-21 RX ADMIN — MAGNESIUM SULFATE HEPTAHYDRATE 2000 MG: 40 INJECTION, SOLUTION INTRAVENOUS at 18:57

## 2024-02-21 RX ADMIN — LEVETIRACETAM 1500 MG: 100 INJECTION INTRAVENOUS at 23:12

## 2024-02-21 RX ADMIN — CALCIUM CARBONATE 500 MG: 500 TABLET, CHEWABLE ORAL at 16:25

## 2024-02-21 RX ADMIN — POTASSIUM CHLORIDE 10 MEQ: 10 INJECTION, SOLUTION INTRAVENOUS at 18:59

## 2024-02-21 RX ADMIN — POTASSIUM BICARBONATE 40 MEQ: 782 TABLET, EFFERVESCENT ORAL at 16:25

## 2024-02-21 RX ADMIN — SODIUM CHLORIDE, PRESERVATIVE FREE 40 MG: 5 INJECTION INTRAVENOUS at 10:17

## 2024-02-21 RX ADMIN — ACETAMINOPHEN 650 MG: 325 TABLET ORAL at 16:25

## 2024-02-21 RX ADMIN — POTASSIUM CHLORIDE 10 MEQ: 10 INJECTION, SOLUTION INTRAVENOUS at 20:06

## 2024-02-21 RX ADMIN — POTASSIUM CHLORIDE 10 MEQ: 10 INJECTION, SOLUTION INTRAVENOUS at 16:24

## 2024-02-21 RX ADMIN — AZTREONAM 2000 MG: 2 INJECTION, POWDER, LYOPHILIZED, FOR SOLUTION INTRAMUSCULAR; INTRAVENOUS at 22:34

## 2024-02-21 RX ADMIN — AZTREONAM 2000 MG: 2 INJECTION, POWDER, LYOPHILIZED, FOR SOLUTION INTRAMUSCULAR; INTRAVENOUS at 16:28

## 2024-02-21 RX ADMIN — POTASSIUM CHLORIDE 10 MEQ: 10 INJECTION, SOLUTION INTRAVENOUS at 16:39

## 2024-02-21 RX ADMIN — AZTREONAM 2000 MG: 2 INJECTION, POWDER, LYOPHILIZED, FOR SOLUTION INTRAMUSCULAR; INTRAVENOUS at 06:07

## 2024-02-21 ASSESSMENT — PAIN SCALES - GENERAL
PAINLEVEL_OUTOF10: 0
PAINLEVEL_OUTOF10: 5
PAINLEVEL_OUTOF10: 0

## 2024-02-21 NOTE — CARE COORDINATION
CM Update: Left message for brother Vicente to discuss transition of care. Awaiting call back. Attempted to call Benito, listed under contacts, no answer and no option to leave a vm. Pt transferred out of MICU. Confused. Sitter at bedside. 4 liters O2. Macdonald for hematuria. Urine cx and cytology pending. CM will attempt to reach brother to initiate discharge planning (TF)      RENATA CarrasquilloN,RN  Case Management  [phone]

## 2024-02-22 PROBLEM — F10.10 ETOH ABUSE: Status: ACTIVE | Noted: 2024-02-22

## 2024-02-22 LAB
ALBUMIN SERPL-MCNC: 2.8 G/DL (ref 3.5–5.2)
ALP SERPL-CCNC: 167 U/L (ref 40–129)
ALT SERPL-CCNC: 42 U/L (ref 0–40)
ANION GAP SERPL CALCULATED.3IONS-SCNC: 12 MMOL/L (ref 7–16)
AST SERPL-CCNC: 232 U/L (ref 0–39)
BILIRUB SERPL-MCNC: 6.5 MG/DL (ref 0–1.2)
BUN SERPL-MCNC: 16 MG/DL (ref 6–23)
CALCIUM SERPL-MCNC: 7.5 MG/DL (ref 8.6–10.2)
CHLORIDE SERPL-SCNC: 103 MMOL/L (ref 98–107)
CO2 SERPL-SCNC: 21 MMOL/L (ref 22–29)
CREAT SERPL-MCNC: 0.5 MG/DL (ref 0.7–1.2)
ERYTHROCYTE [DISTWIDTH] IN BLOOD BY AUTOMATED COUNT: 17 % (ref 11.5–15)
GFR SERPL CREATININE-BSD FRML MDRD: >60 ML/MIN/1.73M2
GLUCOSE SERPL-MCNC: 82 MG/DL (ref 74–99)
HCT VFR BLD AUTO: 32.5 % (ref 37–54)
HGB BLD-MCNC: 10.8 G/DL (ref 12.5–16.5)
MAGNESIUM SERPL-MCNC: 2 MG/DL (ref 1.6–2.6)
MCH RBC QN AUTO: 33.2 PG (ref 26–35)
MCV RBC AUTO: 100 FL (ref 80–99.9)
MICROORGANISM SPEC CULT: NORMAL
MICROORGANISM SPEC CULT: NORMAL
PHOSPHATE SERPL-MCNC: 0.9 MG/DL (ref 2.5–4.5)
PLATELET CONFIRMATION: NORMAL
PLATELET, FLUORESCENCE: 82 K/UL (ref 130–450)
PMV BLD AUTO: 11.3 FL (ref 7–12)
POTASSIUM SERPL-SCNC: 4.7 MMOL/L (ref 3.5–5)
PROT SERPL-MCNC: 6.1 G/DL (ref 6.4–8.3)
RBC # BLD AUTO: 3.25 M/UL (ref 3.8–5.8)
SERVICE CMNT-IMP: NORMAL
SERVICE CMNT-IMP: NORMAL
SODIUM SERPL-SCNC: 136 MMOL/L (ref 132–146)
SPECIMEN DESCRIPTION: NORMAL
SPECIMEN DESCRIPTION: NORMAL
WBC OTHER # BLD: 7.3 K/UL (ref 4.5–11.5)

## 2024-02-22 PROCEDURE — 6360000002 HC RX W HCPCS: Performed by: INTERNAL MEDICINE

## 2024-02-22 PROCEDURE — 2500000003 HC RX 250 WO HCPCS: Performed by: INTERNAL MEDICINE

## 2024-02-22 PROCEDURE — A4216 STERILE WATER/SALINE, 10 ML: HCPCS

## 2024-02-22 PROCEDURE — 36415 COLL VENOUS BLD VENIPUNCTURE: CPT

## 2024-02-22 PROCEDURE — 83735 ASSAY OF MAGNESIUM: CPT

## 2024-02-22 PROCEDURE — C9113 INJ PANTOPRAZOLE SODIUM, VIA: HCPCS

## 2024-02-22 PROCEDURE — 99232 SBSQ HOSP IP/OBS MODERATE 35: CPT | Performed by: INTERNAL MEDICINE

## 2024-02-22 PROCEDURE — 2580000003 HC RX 258: Performed by: INTERNAL MEDICINE

## 2024-02-22 PROCEDURE — 2060000000 HC ICU INTERMEDIATE R&B

## 2024-02-22 PROCEDURE — 2700000000 HC OXYGEN THERAPY PER DAY

## 2024-02-22 PROCEDURE — 6360000002 HC RX W HCPCS

## 2024-02-22 PROCEDURE — 99233 SBSQ HOSP IP/OBS HIGH 50: CPT | Performed by: INTERNAL MEDICINE

## 2024-02-22 PROCEDURE — 80053 COMPREHEN METABOLIC PANEL: CPT

## 2024-02-22 PROCEDURE — 2580000003 HC RX 258

## 2024-02-22 PROCEDURE — 85027 COMPLETE CBC AUTOMATED: CPT

## 2024-02-22 PROCEDURE — 84100 ASSAY OF PHOSPHORUS: CPT

## 2024-02-22 RX ADMIN — LEVETIRACETAM 1500 MG: 100 INJECTION INTRAVENOUS at 11:35

## 2024-02-22 RX ADMIN — AZTREONAM 2000 MG: 2 INJECTION, POWDER, LYOPHILIZED, FOR SOLUTION INTRAMUSCULAR; INTRAVENOUS at 21:29

## 2024-02-22 RX ADMIN — Medication 10 ML: at 09:43

## 2024-02-22 RX ADMIN — SODIUM CHLORIDE, PRESERVATIVE FREE 40 MG: 5 INJECTION INTRAVENOUS at 09:37

## 2024-02-22 RX ADMIN — Medication 10 ML: at 23:02

## 2024-02-22 RX ADMIN — SODIUM PHOSPHATE, MONOBASIC, MONOHYDRATE AND SODIUM PHOSPHATE, DIBASIC, ANHYDROUS 15 MMOL: 142; 276 INJECTION, SOLUTION INTRAVENOUS at 15:20

## 2024-02-22 RX ADMIN — AZTREONAM 2000 MG: 2 INJECTION, POWDER, LYOPHILIZED, FOR SOLUTION INTRAMUSCULAR; INTRAVENOUS at 06:27

## 2024-02-22 RX ADMIN — AZTREONAM 2000 MG: 2 INJECTION, POWDER, LYOPHILIZED, FOR SOLUTION INTRAMUSCULAR; INTRAVENOUS at 15:21

## 2024-02-22 RX ADMIN — LEVETIRACETAM 1500 MG: 100 INJECTION INTRAVENOUS at 23:01

## 2024-02-22 ASSESSMENT — PAIN SCALES - GENERAL
PAINLEVEL_OUTOF10: 0

## 2024-02-22 NOTE — DISCHARGE INSTR - COC
Seizures (HCC) R56.9    History of cirrhosis Z87.19    Cardiomyopathy (HCC) I42.9    Altered mental status R41.82    Polymorphic ventricular tachycardia (HCC) I47.29       Isolation/Infection:   Isolation            No Isolation          Patient Infection Status       None to display            Nurse Assessment:  Last Vital Signs: /68   Pulse 72   Temp 97.6 °F (36.4 °C) (Temporal)   Resp 20   Ht 1.803 m (5' 11\")   Wt 82.3 kg (181 lb 6.4 oz)   SpO2 97%   BMI 25.30 kg/m²     Last documented pain score (0-10 scale): Pain Level: 0  Last Weight:   Wt Readings from Last 1 Encounters:   02/18/24 82.3 kg (181 lb 6.4 oz)     Mental Status:  oriented, alert, coherent, logical, thought processes intact, able to concentrate and follow conversation, and occasionally will have mild confusion    IV Access:  - None    Nursing Mobility/ADLs:  Walking   Dependent  Transfer  Assisted  Bathing  Dependent  Dressing  Dependent  Toileting  Dependent  Feeding  Assisted  Med Admin  Assisted  Med Delivery   whole    Wound Care Documentation and Therapy:        Elimination:  Continence:   Bowel: No  Bladder: pierce  Urinary Catheter: Insertion Date: 02/20/24    Colostomy/Ileostomy/Ileal Conduit: Yes       Date of Last BM: 02/29/2024    Intake/Output Summary (Last 24 hours) at 2/22/2024 1539  Last data filed at 2/22/2024 0815  Gross per 24 hour   Intake 240 ml   Output 800 ml   Net -560 ml     I/O last 3 completed shifts:  In: 1930 [P.O.:480; I.V.:1450]  Out: 3300 [Urine:3300]    Safety Concerns:     History of Falls (last 30 days) and At Risk for Falls    Impairments/Disabilities:      Very weak BLE    Nutrition Therapy:  Current Nutrition Therapy:   - Oral Diet:  General and low fat, low cholesterol, high fiber, SCOTTY    Routes of Feeding: Oral  Liquids: Thin Liquids  Daily Fluid Restriction: no  Last Modified Barium Swallow with Video (Video Swallowing Test): not done    Treatments at the Time of Hospital Discharge:   Respiratory

## 2024-02-22 NOTE — CARE COORDINATION
CM Update: Spoke to brother Vicente over the phone. He is agreeable to ROWAN at discharge. Would like to stay near [place] if possible. Reviewed list, choiced for #1 [facility] and #2 [facility]. Referrals called to [facility]. Updated pt at bedside as well and he is agreeable to ROWAN. Will await decision (TF)      8712--[facility] has accepted, initiating precert today. SVAANA/destination completed. PASRR and ambulance form on soft chart (TF)        RENATA CarrasquilloN,RN  Case Management  [phone]

## 2024-02-23 LAB
ABO + RH BLD: NORMAL
ACTIVATED CLOTTING TIME, LOW RANGE: 295 SEC
ACTIVATED CLOTTING TIME, LOW RANGE: >400 SEC
ALBUMIN SERPL-MCNC: 1.8 G/DL (ref 3.5–5.2)
ALP SERPL-CCNC: 108 U/L (ref 40–129)
ALT SERPL-CCNC: 26 U/L (ref 0–40)
ANION GAP SERPL CALCULATED.3IONS-SCNC: 10 MMOL/L (ref 7–16)
ANION GAP SERPL CALCULATED.3IONS-SCNC: 15 MMOL/L (ref 7–16)
ARM BAND NUMBER: NORMAL
AST SERPL-CCNC: 136 U/L (ref 0–39)
BILIRUB SERPL-MCNC: 4.1 MG/DL (ref 0–1.2)
BLOOD BANK SAMPLE EXPIRATION: NORMAL
BLOOD GROUP ANTIBODIES SERPL: NEGATIVE
BUN SERPL-MCNC: 10 MG/DL (ref 6–23)
BUN SERPL-MCNC: 13 MG/DL (ref 6–23)
CALCIUM SERPL-MCNC: 6.6 MG/DL (ref 8.6–10.2)
CALCIUM SERPL-MCNC: 7.5 MG/DL (ref 8.6–10.2)
CHLORIDE SERPL-SCNC: 102 MMOL/L (ref 98–107)
CHLORIDE SERPL-SCNC: 103 MMOL/L (ref 98–107)
CO2 SERPL-SCNC: 17 MMOL/L (ref 22–29)
CO2 SERPL-SCNC: 24 MMOL/L (ref 22–29)
CREAT SERPL-MCNC: 0.4 MG/DL (ref 0.7–1.2)
CREAT SERPL-MCNC: 0.5 MG/DL (ref 0.7–1.2)
EKG ATRIAL RATE: 75 BPM
EKG P AXIS: 46 DEGREES
EKG P-R INTERVAL: 142 MS
EKG Q-T INTERVAL: 476 MS
EKG QRS DURATION: 80 MS
EKG QTC CALCULATION (BAZETT): 531 MS
EKG R AXIS: -3 DEGREES
EKG T AXIS: 8 DEGREES
EKG VENTRICULAR RATE: 75 BPM
ERYTHROCYTE [DISTWIDTH] IN BLOOD BY AUTOMATED COUNT: 17.1 % (ref 11.5–15)
GFR SERPL CREATININE-BSD FRML MDRD: >60 ML/MIN/1.73M2
GFR SERPL CREATININE-BSD FRML MDRD: >60 ML/MIN/1.73M2
GLUCOSE BLD-MCNC: 110 MG/DL (ref 74–99)
GLUCOSE BLD-MCNC: 92 MG/DL (ref 74–99)
GLUCOSE SERPL-MCNC: 62 MG/DL (ref 74–99)
GLUCOSE SERPL-MCNC: 85 MG/DL (ref 74–99)
HCT VFR BLD AUTO: 30.1 % (ref 37–54)
HGB BLD-MCNC: 9.6 G/DL (ref 12.5–16.5)
MAGNESIUM SERPL-MCNC: 1.1 MG/DL (ref 1.6–2.6)
MCH RBC QN AUTO: 32.9 PG (ref 26–35)
MCHC RBC AUTO-ENTMCNC: 31.9 G/DL (ref 32–34.5)
MCV RBC AUTO: 103.1 FL (ref 80–99.9)
PHOSPHATE SERPL-MCNC: 0.9 MG/DL (ref 2.5–4.5)
PLATELET # BLD AUTO: 67 K/UL (ref 130–450)
PLATELET CONFIRMATION: NORMAL
PMV BLD AUTO: 11 FL (ref 7–12)
POTASSIUM SERPL-SCNC: 3.8 MMOL/L (ref 3.5–5)
POTASSIUM SERPL-SCNC: 3.8 MMOL/L (ref 3.5–5)
PROT SERPL-MCNC: 3.6 G/DL (ref 6.4–8.3)
RBC # BLD AUTO: 2.92 M/UL (ref 3.8–5.8)
SODIUM SERPL-SCNC: 134 MMOL/L (ref 132–146)
SODIUM SERPL-SCNC: 137 MMOL/L (ref 132–146)
WBC OTHER # BLD: 5.3 K/UL (ref 4.5–11.5)

## 2024-02-23 PROCEDURE — C9600 PERC DRUG-EL COR STENT SING: HCPCS | Performed by: INTERNAL MEDICINE

## 2024-02-23 PROCEDURE — 80048 BASIC METABOLIC PNL TOTAL CA: CPT

## 2024-02-23 PROCEDURE — 6360000002 HC RX W HCPCS: Performed by: STUDENT IN AN ORGANIZED HEALTH CARE EDUCATION/TRAINING PROGRAM

## 2024-02-23 PROCEDURE — 027236Z DILATION OF CORONARY ARTERY, THREE ARTERIES WITH THREE DRUG-ELUTING INTRALUMINAL DEVICES, PERCUTANEOUS APPROACH: ICD-10-PCS | Performed by: INTERNAL MEDICINE

## 2024-02-23 PROCEDURE — 86850 RBC ANTIBODY SCREEN: CPT

## 2024-02-23 PROCEDURE — 2500000003 HC RX 250 WO HCPCS: Performed by: STUDENT IN AN ORGANIZED HEALTH CARE EDUCATION/TRAINING PROGRAM

## 2024-02-23 PROCEDURE — 2580000003 HC RX 258: Performed by: INTERNAL MEDICINE

## 2024-02-23 PROCEDURE — 92928 PRQ TCAT PLMT NTRAC ST 1 LES: CPT | Performed by: INTERNAL MEDICINE

## 2024-02-23 PROCEDURE — 6360000004 HC RX CONTRAST MEDICATION: Performed by: INTERNAL MEDICINE

## 2024-02-23 PROCEDURE — 80053 COMPREHEN METABOLIC PANEL: CPT

## 2024-02-23 PROCEDURE — 6370000000 HC RX 637 (ALT 250 FOR IP): Performed by: INTERNAL MEDICINE

## 2024-02-23 PROCEDURE — 4A023N7 MEASUREMENT OF CARDIAC SAMPLING AND PRESSURE, LEFT HEART, PERCUTANEOUS APPROACH: ICD-10-PCS | Performed by: INTERNAL MEDICINE

## 2024-02-23 PROCEDURE — C1769 GUIDE WIRE: HCPCS | Performed by: INTERNAL MEDICINE

## 2024-02-23 PROCEDURE — B2111ZZ FLUOROSCOPY OF MULTIPLE CORONARY ARTERIES USING LOW OSMOLAR CONTRAST: ICD-10-PCS | Performed by: INTERNAL MEDICINE

## 2024-02-23 PROCEDURE — 6360000002 HC RX W HCPCS: Performed by: INTERNAL MEDICINE

## 2024-02-23 PROCEDURE — 2580000003 HC RX 258: Performed by: STUDENT IN AN ORGANIZED HEALTH CARE EDUCATION/TRAINING PROGRAM

## 2024-02-23 PROCEDURE — 36415 COLL VENOUS BLD VENIPUNCTURE: CPT

## 2024-02-23 PROCEDURE — C1894 INTRO/SHEATH, NON-LASER: HCPCS | Performed by: INTERNAL MEDICINE

## 2024-02-23 PROCEDURE — 93005 ELECTROCARDIOGRAM TRACING: CPT | Performed by: INTERNAL MEDICINE

## 2024-02-23 PROCEDURE — 93010 ELECTROCARDIOGRAM REPORT: CPT | Performed by: INTERNAL MEDICINE

## 2024-02-23 PROCEDURE — 84100 ASSAY OF PHOSPHORUS: CPT

## 2024-02-23 PROCEDURE — P9047 ALBUMIN (HUMAN), 25%, 50ML: HCPCS | Performed by: INTERNAL MEDICINE

## 2024-02-23 PROCEDURE — 86900 BLOOD TYPING SEROLOGIC ABO: CPT

## 2024-02-23 PROCEDURE — 2709999900 HC NON-CHARGEABLE SUPPLY: Performed by: INTERNAL MEDICINE

## 2024-02-23 PROCEDURE — C9601 PERC DRUG-EL COR STENT BRAN: HCPCS | Performed by: INTERNAL MEDICINE

## 2024-02-23 PROCEDURE — 85347 COAGULATION TIME ACTIVATED: CPT

## 2024-02-23 PROCEDURE — 85027 COMPLETE CBC AUTOMATED: CPT

## 2024-02-23 PROCEDURE — C9113 INJ PANTOPRAZOLE SODIUM, VIA: HCPCS

## 2024-02-23 PROCEDURE — 99232 SBSQ HOSP IP/OBS MODERATE 35: CPT | Performed by: STUDENT IN AN ORGANIZED HEALTH CARE EDUCATION/TRAINING PROGRAM

## 2024-02-23 PROCEDURE — 93458 L HRT ARTERY/VENTRICLE ANGIO: CPT | Performed by: INTERNAL MEDICINE

## 2024-02-23 PROCEDURE — 2140000000 HC CCU INTERMEDIATE R&B

## 2024-02-23 PROCEDURE — A4216 STERILE WATER/SALINE, 10 ML: HCPCS

## 2024-02-23 PROCEDURE — 2580000003 HC RX 258

## 2024-02-23 PROCEDURE — 6370000000 HC RX 637 (ALT 250 FOR IP): Performed by: STUDENT IN AN ORGANIZED HEALTH CARE EDUCATION/TRAINING PROGRAM

## 2024-02-23 PROCEDURE — 92929 PR PRQ TRLUML CORONARY STENT W/ANGIO ADDL ART/BRNCH: CPT | Performed by: INTERNAL MEDICINE

## 2024-02-23 PROCEDURE — C1887 CATHETER, GUIDING: HCPCS | Performed by: INTERNAL MEDICINE

## 2024-02-23 PROCEDURE — 82962 GLUCOSE BLOOD TEST: CPT

## 2024-02-23 PROCEDURE — 86901 BLOOD TYPING SEROLOGIC RH(D): CPT

## 2024-02-23 PROCEDURE — 2500000003 HC RX 250 WO HCPCS: Performed by: INTERNAL MEDICINE

## 2024-02-23 PROCEDURE — 6360000002 HC RX W HCPCS

## 2024-02-23 PROCEDURE — P9047 ALBUMIN (HUMAN), 25%, 50ML: HCPCS | Performed by: STUDENT IN AN ORGANIZED HEALTH CARE EDUCATION/TRAINING PROGRAM

## 2024-02-23 PROCEDURE — C1725 CATH, TRANSLUMIN NON-LASER: HCPCS | Performed by: INTERNAL MEDICINE

## 2024-02-23 PROCEDURE — C1874 STENT, COATED/COV W/DEL SYS: HCPCS | Performed by: INTERNAL MEDICINE

## 2024-02-23 PROCEDURE — 02HV33Z INSERTION OF INFUSION DEVICE INTO SUPERIOR VENA CAVA, PERCUTANEOUS APPROACH: ICD-10-PCS | Performed by: INTERNAL MEDICINE

## 2024-02-23 PROCEDURE — 83735 ASSAY OF MAGNESIUM: CPT

## 2024-02-23 DEVICE — STENT ONYXNG25015UX ONYX 2.50X15RX
Type: IMPLANTABLE DEVICE | Site: CORONARY | Status: FUNCTIONAL
Brand: ONYX FRONTIER™

## 2024-02-23 DEVICE — STENT ONYXNG25018UX ONYX 2.50X18RX
Type: IMPLANTABLE DEVICE | Site: CORONARY | Status: FUNCTIONAL
Brand: ONYX FRONTIER™

## 2024-02-23 RX ORDER — MIDAZOLAM HYDROCHLORIDE 1 MG/ML
INJECTION INTRAMUSCULAR; INTRAVENOUS PRN
Status: DISCONTINUED | OUTPATIENT
Start: 2024-02-23 | End: 2024-02-23 | Stop reason: HOSPADM

## 2024-02-23 RX ORDER — MAGNESIUM SULFATE IN WATER 40 MG/ML
4000 INJECTION, SOLUTION INTRAVENOUS ONCE
Status: DISCONTINUED | OUTPATIENT
Start: 2024-02-23 | End: 2024-02-23 | Stop reason: SDUPTHER

## 2024-02-23 RX ORDER — FENTANYL CITRATE 50 UG/ML
INJECTION, SOLUTION INTRAMUSCULAR; INTRAVENOUS PRN
Status: DISCONTINUED | OUTPATIENT
Start: 2024-02-23 | End: 2024-02-23 | Stop reason: HOSPADM

## 2024-02-23 RX ORDER — SODIUM CHLORIDE 9 MG/ML
INJECTION, SOLUTION INTRAVENOUS CONTINUOUS PRN
Status: COMPLETED | OUTPATIENT
Start: 2024-02-23 | End: 2024-02-23

## 2024-02-23 RX ORDER — ALBUMIN (HUMAN) 12.5 G/50ML
25 SOLUTION INTRAVENOUS EVERY 8 HOURS
Status: COMPLETED | OUTPATIENT
Start: 2024-02-23 | End: 2024-02-24

## 2024-02-23 RX ORDER — ASPIRIN 81 MG/1
81 TABLET ORAL DAILY
Status: DISCONTINUED | OUTPATIENT
Start: 2024-02-24 | End: 2024-02-29 | Stop reason: HOSPADM

## 2024-02-23 RX ORDER — ROSUVASTATIN CALCIUM 10 MG/1
40 TABLET, COATED ORAL NIGHTLY
Status: DISCONTINUED | OUTPATIENT
Start: 2024-02-23 | End: 2024-02-29 | Stop reason: HOSPADM

## 2024-02-23 RX ORDER — CLOPIDOGREL BISULFATE 75 MG/1
TABLET ORAL PRN
Status: DISCONTINUED | OUTPATIENT
Start: 2024-02-23 | End: 2024-02-23 | Stop reason: HOSPADM

## 2024-02-23 RX ORDER — ACETAMINOPHEN 325 MG/1
650 TABLET ORAL EVERY 4 HOURS PRN
Status: DISCONTINUED | OUTPATIENT
Start: 2024-02-23 | End: 2024-02-29 | Stop reason: HOSPADM

## 2024-02-23 RX ORDER — VERAPAMIL HYDROCHLORIDE 2.5 MG/ML
INJECTION, SOLUTION INTRAVENOUS PRN
Status: DISCONTINUED | OUTPATIENT
Start: 2024-02-23 | End: 2024-02-23 | Stop reason: HOSPADM

## 2024-02-23 RX ORDER — MAGNESIUM SULFATE IN WATER 40 MG/ML
4000 INJECTION, SOLUTION INTRAVENOUS ONCE
Status: COMPLETED | OUTPATIENT
Start: 2024-02-23 | End: 2024-02-23

## 2024-02-23 RX ORDER — OXYCODONE HYDROCHLORIDE AND ACETAMINOPHEN 5; 325 MG/1; MG/1
1 TABLET ORAL EVERY 4 HOURS PRN
Status: DISCONTINUED | OUTPATIENT
Start: 2024-02-23 | End: 2024-02-29 | Stop reason: HOSPADM

## 2024-02-23 RX ORDER — CLOPIDOGREL BISULFATE 75 MG/1
75 TABLET ORAL DAILY
Status: DISCONTINUED | OUTPATIENT
Start: 2024-02-24 | End: 2024-02-29 | Stop reason: HOSPADM

## 2024-02-23 RX ORDER — MAGNESIUM SULFATE IN WATER 40 MG/ML
2000 INJECTION, SOLUTION INTRAVENOUS ONCE
Status: COMPLETED | OUTPATIENT
Start: 2024-02-23 | End: 2024-02-23

## 2024-02-23 RX ORDER — SODIUM CHLORIDE 9 MG/ML
INJECTION, SOLUTION INTRAVENOUS CONTINUOUS
Status: ACTIVE | OUTPATIENT
Start: 2024-02-23 | End: 2024-02-24

## 2024-02-23 RX ORDER — HEPARIN SODIUM 10000 [USP'U]/ML
INJECTION, SOLUTION INTRAVENOUS; SUBCUTANEOUS PRN
Status: DISCONTINUED | OUTPATIENT
Start: 2024-02-23 | End: 2024-02-23 | Stop reason: HOSPADM

## 2024-02-23 RX ORDER — ASPIRIN 81 MG/1
324 TABLET, CHEWABLE ORAL ONCE
Status: COMPLETED | OUTPATIENT
Start: 2024-02-23 | End: 2024-02-23

## 2024-02-23 RX ORDER — POTASSIUM CHLORIDE 20 MEQ/1
20 TABLET, EXTENDED RELEASE ORAL ONCE
Status: COMPLETED | OUTPATIENT
Start: 2024-02-23 | End: 2024-02-23

## 2024-02-23 RX ORDER — NITROGLYCERIN 20 MG/100ML
INJECTION INTRAVENOUS PRN
Status: DISCONTINUED | OUTPATIENT
Start: 2024-02-23 | End: 2024-02-23 | Stop reason: HOSPADM

## 2024-02-23 RX ADMIN — ASPIRIN 324 MG: 81 TABLET, CHEWABLE ORAL at 13:34

## 2024-02-23 RX ADMIN — DIBASIC SODIUM PHOSPHATE, MONOBASIC POTASSIUM PHOSPHATE AND MONOBASIC SODIUM PHOSPHATE 1 TABLET: 852; 155; 130 TABLET ORAL at 09:11

## 2024-02-23 RX ADMIN — ALBUMIN (HUMAN) 25 G: 0.25 INJECTION, SOLUTION INTRAVENOUS at 19:33

## 2024-02-23 RX ADMIN — ALBUMIN (HUMAN) 25 G: 0.25 INJECTION, SOLUTION INTRAVENOUS at 09:31

## 2024-02-23 RX ADMIN — Medication 10 ML: at 09:12

## 2024-02-23 RX ADMIN — AZTREONAM 2000 MG: 1 INJECTION, POWDER, LYOPHILIZED, FOR SOLUTION INTRAMUSCULAR; INTRAVENOUS at 18:46

## 2024-02-23 RX ADMIN — LEVETIRACETAM 1500 MG: 100 INJECTION INTRAVENOUS at 23:27

## 2024-02-23 RX ADMIN — MAGNESIUM SULFATE HEPTAHYDRATE 2000 MG: 40 INJECTION, SOLUTION INTRAVENOUS at 17:51

## 2024-02-23 RX ADMIN — Medication 10 ML: at 21:18

## 2024-02-23 RX ADMIN — POTASSIUM PHOSPHATE, MONOBASIC AND POTASSIUM PHOSPHATE, DIBASIC 30 MMOL: 224; 236 INJECTION, SOLUTION, CONCENTRATE INTRAVENOUS at 09:34

## 2024-02-23 RX ADMIN — SODIUM CHLORIDE, PRESERVATIVE FREE 40 MG: 5 INJECTION INTRAVENOUS at 09:12

## 2024-02-23 RX ADMIN — LEVETIRACETAM 1500 MG: 100 INJECTION INTRAVENOUS at 12:29

## 2024-02-23 RX ADMIN — AZTREONAM 2000 MG: 2 INJECTION, POWDER, LYOPHILIZED, FOR SOLUTION INTRAMUSCULAR; INTRAVENOUS at 06:06

## 2024-02-23 RX ADMIN — SODIUM CHLORIDE: 9 INJECTION, SOLUTION INTRAVENOUS at 17:02

## 2024-02-23 RX ADMIN — ROSUVASTATIN CALCIUM 40 MG: 20 TABLET, FILM COATED ORAL at 21:18

## 2024-02-23 RX ADMIN — POTASSIUM CHLORIDE 20 MEQ: 1500 TABLET, EXTENDED RELEASE ORAL at 09:11

## 2024-02-23 RX ADMIN — MAGNESIUM SULFATE HEPTAHYDRATE 4000 MG: 40 INJECTION, SOLUTION INTRAVENOUS at 10:54

## 2024-02-23 NOTE — CARE COORDINATION
CM Update: Precert pending to [facility], initiated yesterday. SAVANA/destination complete. PASRR and ambulance form on soft chart. Pt scheduled for heart cath today. Electrolyte replacement. CM to follow(TF)    1430--Auth obtained for [facility], good through Thurs 2/29 (TF)        RENATA CarrasquilloN,RN  Case Management  [phone]

## 2024-02-24 LAB
ALBUMIN SERPL-MCNC: 3.7 G/DL (ref 3.5–5.2)
ALP SERPL-CCNC: 169 U/L (ref 40–129)
ALT SERPL-CCNC: 38 U/L (ref 0–40)
ANION GAP SERPL CALCULATED.3IONS-SCNC: 13 MMOL/L (ref 7–16)
AST SERPL-CCNC: 203 U/L (ref 0–39)
BILIRUB SERPL-MCNC: 8.3 MG/DL (ref 0–1.2)
BUN SERPL-MCNC: 12 MG/DL (ref 6–23)
CALCIUM SERPL-MCNC: 8 MG/DL (ref 8.6–10.2)
CHLORIDE SERPL-SCNC: 103 MMOL/L (ref 98–107)
CO2 SERPL-SCNC: 22 MMOL/L (ref 22–29)
CREAT SERPL-MCNC: 0.5 MG/DL (ref 0.7–1.2)
ECHO BSA: 1.91 M2
ERYTHROCYTE [DISTWIDTH] IN BLOOD BY AUTOMATED COUNT: 17.7 % (ref 11.5–15)
GFR SERPL CREATININE-BSD FRML MDRD: >60 ML/MIN/1.73M2
GLUCOSE BLD-MCNC: 123 MG/DL (ref 74–99)
GLUCOSE SERPL-MCNC: 93 MG/DL (ref 74–99)
HCT VFR BLD AUTO: 29.6 % (ref 37–54)
HGB BLD-MCNC: 10.1 G/DL (ref 12.5–16.5)
INR PPP: 1.9
MAGNESIUM SERPL-MCNC: 2.2 MG/DL (ref 1.6–2.6)
MCH RBC QN AUTO: 33.6 PG (ref 26–35)
MCHC RBC AUTO-ENTMCNC: 34.1 G/DL (ref 32–34.5)
MCV RBC AUTO: 98.3 FL (ref 80–99.9)
PHOSPHATE SERPL-MCNC: 1.5 MG/DL (ref 2.5–4.5)
PLATELET CONFIRMATION: NORMAL
PLATELET, FLUORESCENCE: 79 K/UL (ref 130–450)
PMV BLD AUTO: 10.7 FL (ref 7–12)
POTASSIUM SERPL-SCNC: 3.6 MMOL/L (ref 3.5–5)
PROT SERPL-MCNC: 6.6 G/DL (ref 6.4–8.3)
PROTHROMBIN TIME: 20.5 SEC (ref 9.3–12.4)
RBC # BLD AUTO: 3.01 M/UL (ref 3.8–5.8)
SODIUM SERPL-SCNC: 138 MMOL/L (ref 132–146)
WBC OTHER # BLD: 6.3 K/UL (ref 4.5–11.5)

## 2024-02-24 PROCEDURE — 85610 PROTHROMBIN TIME: CPT

## 2024-02-24 PROCEDURE — C9113 INJ PANTOPRAZOLE SODIUM, VIA: HCPCS | Performed by: INTERNAL MEDICINE

## 2024-02-24 PROCEDURE — 6360000002 HC RX W HCPCS: Performed by: INTERNAL MEDICINE

## 2024-02-24 PROCEDURE — A4216 STERILE WATER/SALINE, 10 ML: HCPCS | Performed by: INTERNAL MEDICINE

## 2024-02-24 PROCEDURE — 2580000003 HC RX 258: Performed by: INTERNAL MEDICINE

## 2024-02-24 PROCEDURE — 99223 1ST HOSP IP/OBS HIGH 75: CPT | Performed by: STUDENT IN AN ORGANIZED HEALTH CARE EDUCATION/TRAINING PROGRAM

## 2024-02-24 PROCEDURE — 85027 COMPLETE CBC AUTOMATED: CPT

## 2024-02-24 PROCEDURE — 82962 GLUCOSE BLOOD TEST: CPT

## 2024-02-24 PROCEDURE — 36415 COLL VENOUS BLD VENIPUNCTURE: CPT

## 2024-02-24 PROCEDURE — 6370000000 HC RX 637 (ALT 250 FOR IP): Performed by: STUDENT IN AN ORGANIZED HEALTH CARE EDUCATION/TRAINING PROGRAM

## 2024-02-24 PROCEDURE — 99232 SBSQ HOSP IP/OBS MODERATE 35: CPT | Performed by: STUDENT IN AN ORGANIZED HEALTH CARE EDUCATION/TRAINING PROGRAM

## 2024-02-24 PROCEDURE — 83735 ASSAY OF MAGNESIUM: CPT

## 2024-02-24 PROCEDURE — 99232 SBSQ HOSP IP/OBS MODERATE 35: CPT | Performed by: INTERNAL MEDICINE

## 2024-02-24 PROCEDURE — 2140000000 HC CCU INTERMEDIATE R&B

## 2024-02-24 PROCEDURE — 6370000000 HC RX 637 (ALT 250 FOR IP): Performed by: INTERNAL MEDICINE

## 2024-02-24 PROCEDURE — 84100 ASSAY OF PHOSPHORUS: CPT

## 2024-02-24 PROCEDURE — P9047 ALBUMIN (HUMAN), 25%, 50ML: HCPCS | Performed by: INTERNAL MEDICINE

## 2024-02-24 PROCEDURE — 2580000003 HC RX 258: Performed by: STUDENT IN AN ORGANIZED HEALTH CARE EDUCATION/TRAINING PROGRAM

## 2024-02-24 PROCEDURE — 80053 COMPREHEN METABOLIC PANEL: CPT

## 2024-02-24 PROCEDURE — 2500000003 HC RX 250 WO HCPCS: Performed by: STUDENT IN AN ORGANIZED HEALTH CARE EDUCATION/TRAINING PROGRAM

## 2024-02-24 RX ADMIN — OXYCODONE HYDROCHLORIDE AND ACETAMINOPHEN 1 TABLET: 5; 325 TABLET ORAL at 16:19

## 2024-02-24 RX ADMIN — OXYCODONE HYDROCHLORIDE AND ACETAMINOPHEN 1 TABLET: 5; 325 TABLET ORAL at 22:22

## 2024-02-24 RX ADMIN — DIBASIC SODIUM PHOSPHATE, MONOBASIC POTASSIUM PHOSPHATE AND MONOBASIC SODIUM PHOSPHATE 1 TABLET: 852; 155; 130 TABLET ORAL at 22:17

## 2024-02-24 RX ADMIN — ALBUMIN (HUMAN) 25 G: 0.25 INJECTION, SOLUTION INTRAVENOUS at 04:00

## 2024-02-24 RX ADMIN — ASPIRIN 81 MG: 81 TABLET, COATED ORAL at 10:08

## 2024-02-24 RX ADMIN — POTASSIUM PHOSPHATE, MONOBASIC AND POTASSIUM PHOSPHATE, DIBASIC 30 MMOL: 224; 236 INJECTION, SOLUTION, CONCENTRATE INTRAVENOUS at 10:19

## 2024-02-24 RX ADMIN — SODIUM CHLORIDE: 9 INJECTION, SOLUTION INTRAVENOUS at 00:56

## 2024-02-24 RX ADMIN — SODIUM CHLORIDE, PRESERVATIVE FREE 40 MG: 5 INJECTION INTRAVENOUS at 10:07

## 2024-02-24 RX ADMIN — CLOPIDOGREL BISULFATE 75 MG: 75 TABLET ORAL at 10:08

## 2024-02-24 RX ADMIN — ROSUVASTATIN CALCIUM 40 MG: 20 TABLET, FILM COATED ORAL at 22:17

## 2024-02-24 RX ADMIN — POTASSIUM BICARBONATE 20 MEQ: 782 TABLET, EFFERVESCENT ORAL at 10:06

## 2024-02-24 RX ADMIN — POTASSIUM BICARBONATE 20 MEQ: 782 TABLET, EFFERVESCENT ORAL at 16:20

## 2024-02-24 RX ADMIN — Medication 10 ML: at 10:07

## 2024-02-24 RX ADMIN — DIBASIC SODIUM PHOSPHATE, MONOBASIC POTASSIUM PHOSPHATE AND MONOBASIC SODIUM PHOSPHATE 1 TABLET: 852; 155; 130 TABLET ORAL at 10:09

## 2024-02-24 RX ADMIN — LEVETIRACETAM 1500 MG: 100 INJECTION INTRAVENOUS at 13:18

## 2024-02-24 ASSESSMENT — PAIN DESCRIPTION - LOCATION
LOCATION: CHEST;ARM
LOCATION: RIB CAGE;SHOULDER

## 2024-02-24 ASSESSMENT — PAIN SCALES - GENERAL
PAINLEVEL_OUTOF10: 7
PAINLEVEL_OUTOF10: 5

## 2024-02-24 ASSESSMENT — PAIN DESCRIPTION - ORIENTATION: ORIENTATION: RIGHT;LEFT

## 2024-02-24 ASSESSMENT — PAIN DESCRIPTION - DESCRIPTORS
DESCRIPTORS: SORE
DESCRIPTORS: ACHING

## 2024-02-24 ASSESSMENT — PAIN - FUNCTIONAL ASSESSMENT: PAIN_FUNCTIONAL_ASSESSMENT: ACTIVITIES ARE NOT PREVENTED

## 2024-02-25 ENCOUNTER — APPOINTMENT (OUTPATIENT)
Dept: GENERAL RADIOLOGY | Age: 69
DRG: 981 | End: 2024-02-25
Payer: COMMERCIAL

## 2024-02-25 LAB
ALBUMIN SERPL-MCNC: 3.2 G/DL (ref 3.5–5.2)
ALP SERPL-CCNC: 197 U/L (ref 40–129)
ALT SERPL-CCNC: 37 U/L (ref 0–40)
ANION GAP SERPL CALCULATED.3IONS-SCNC: 9 MMOL/L (ref 7–16)
AST SERPL-CCNC: 194 U/L (ref 0–39)
BILIRUB SERPL-MCNC: 6.9 MG/DL (ref 0–1.2)
BUN SERPL-MCNC: 10 MG/DL (ref 6–23)
CALCIUM SERPL-MCNC: 7.8 MG/DL (ref 8.6–10.2)
CHLORIDE SERPL-SCNC: 104 MMOL/L (ref 98–107)
CO2 SERPL-SCNC: 22 MMOL/L (ref 22–29)
CREAT SERPL-MCNC: 0.5 MG/DL (ref 0.7–1.2)
ERYTHROCYTE [DISTWIDTH] IN BLOOD BY AUTOMATED COUNT: 17.7 % (ref 11.5–15)
GFR SERPL CREATININE-BSD FRML MDRD: >60 ML/MIN/1.73M2
GLUCOSE SERPL-MCNC: 94 MG/DL (ref 74–99)
HCT VFR BLD AUTO: 30.9 % (ref 37–54)
HGB BLD-MCNC: 10.4 G/DL (ref 12.5–16.5)
MAGNESIUM SERPL-MCNC: 2.1 MG/DL (ref 1.6–2.6)
MCH RBC QN AUTO: 34.4 PG (ref 26–35)
MCHC RBC AUTO-ENTMCNC: 33.7 G/DL (ref 32–34.5)
MCV RBC AUTO: 102.3 FL (ref 80–99.9)
PHOSPHATE SERPL-MCNC: 1.4 MG/DL (ref 2.5–4.5)
PLATELET CONFIRMATION: NORMAL
PLATELET, FLUORESCENCE: 96 K/UL (ref 130–450)
PMV BLD AUTO: 10.7 FL (ref 7–12)
POTASSIUM SERPL-SCNC: 4.1 MMOL/L (ref 3.5–5)
PROT SERPL-MCNC: 6.4 G/DL (ref 6.4–8.3)
RBC # BLD AUTO: 3.02 M/UL (ref 3.8–5.8)
SODIUM SERPL-SCNC: 135 MMOL/L (ref 132–146)
WBC OTHER # BLD: 7.3 K/UL (ref 4.5–11.5)

## 2024-02-25 PROCEDURE — 36415 COLL VENOUS BLD VENIPUNCTURE: CPT

## 2024-02-25 PROCEDURE — 2140000000 HC CCU INTERMEDIATE R&B

## 2024-02-25 PROCEDURE — 2580000003 HC RX 258: Performed by: STUDENT IN AN ORGANIZED HEALTH CARE EDUCATION/TRAINING PROGRAM

## 2024-02-25 PROCEDURE — 99233 SBSQ HOSP IP/OBS HIGH 50: CPT | Performed by: STUDENT IN AN ORGANIZED HEALTH CARE EDUCATION/TRAINING PROGRAM

## 2024-02-25 PROCEDURE — 84100 ASSAY OF PHOSPHORUS: CPT

## 2024-02-25 PROCEDURE — 97535 SELF CARE MNGMENT TRAINING: CPT

## 2024-02-25 PROCEDURE — 80053 COMPREHEN METABOLIC PANEL: CPT

## 2024-02-25 PROCEDURE — 6370000000 HC RX 637 (ALT 250 FOR IP): Performed by: INTERNAL MEDICINE

## 2024-02-25 PROCEDURE — 2500000003 HC RX 250 WO HCPCS: Performed by: STUDENT IN AN ORGANIZED HEALTH CARE EDUCATION/TRAINING PROGRAM

## 2024-02-25 PROCEDURE — A4216 STERILE WATER/SALINE, 10 ML: HCPCS | Performed by: INTERNAL MEDICINE

## 2024-02-25 PROCEDURE — 99232 SBSQ HOSP IP/OBS MODERATE 35: CPT | Performed by: STUDENT IN AN ORGANIZED HEALTH CARE EDUCATION/TRAINING PROGRAM

## 2024-02-25 PROCEDURE — C9113 INJ PANTOPRAZOLE SODIUM, VIA: HCPCS | Performed by: INTERNAL MEDICINE

## 2024-02-25 PROCEDURE — 83735 ASSAY OF MAGNESIUM: CPT

## 2024-02-25 PROCEDURE — 85027 COMPLETE CBC AUTOMATED: CPT

## 2024-02-25 PROCEDURE — 2580000003 HC RX 258: Performed by: INTERNAL MEDICINE

## 2024-02-25 PROCEDURE — 6370000000 HC RX 637 (ALT 250 FOR IP): Performed by: STUDENT IN AN ORGANIZED HEALTH CARE EDUCATION/TRAINING PROGRAM

## 2024-02-25 PROCEDURE — 6360000002 HC RX W HCPCS: Performed by: STUDENT IN AN ORGANIZED HEALTH CARE EDUCATION/TRAINING PROGRAM

## 2024-02-25 PROCEDURE — 6360000002 HC RX W HCPCS: Performed by: INTERNAL MEDICINE

## 2024-02-25 PROCEDURE — 93005 ELECTROCARDIOGRAM TRACING: CPT | Performed by: INTERNAL MEDICINE

## 2024-02-25 PROCEDURE — 97530 THERAPEUTIC ACTIVITIES: CPT

## 2024-02-25 PROCEDURE — 99232 SBSQ HOSP IP/OBS MODERATE 35: CPT | Performed by: INTERNAL MEDICINE

## 2024-02-25 PROCEDURE — 71045 X-RAY EXAM CHEST 1 VIEW: CPT

## 2024-02-25 RX ORDER — CHOLECALCIFEROL (VITAMIN D3) 50 MCG
6000 TABLET ORAL DAILY
Status: DISCONTINUED | OUTPATIENT
Start: 2024-02-25 | End: 2024-02-29 | Stop reason: HOSPADM

## 2024-02-25 RX ORDER — VITAMIN B COMPLEX
2000 TABLET ORAL DAILY
Status: DISCONTINUED | OUTPATIENT
Start: 2024-03-03 | End: 2024-02-29 | Stop reason: HOSPADM

## 2024-02-25 RX ORDER — ALBUTEROL SULFATE 2.5 MG/3ML
2.5 SOLUTION RESPIRATORY (INHALATION) EVERY 4 HOURS PRN
Status: DISCONTINUED | OUTPATIENT
Start: 2024-02-25 | End: 2024-02-29 | Stop reason: HOSPADM

## 2024-02-25 RX ORDER — IPRATROPIUM BROMIDE AND ALBUTEROL SULFATE 2.5; .5 MG/3ML; MG/3ML
1 SOLUTION RESPIRATORY (INHALATION) EVERY 4 HOURS PRN
Status: DISCONTINUED | OUTPATIENT
Start: 2024-02-25 | End: 2024-02-29 | Stop reason: HOSPADM

## 2024-02-25 RX ORDER — IPRATROPIUM BROMIDE AND ALBUTEROL SULFATE 2.5; .5 MG/3ML; MG/3ML
1 SOLUTION RESPIRATORY (INHALATION)
Status: DISCONTINUED | OUTPATIENT
Start: 2024-02-25 | End: 2024-02-25

## 2024-02-25 RX ORDER — METOPROLOL SUCCINATE 25 MG/1
25 TABLET, EXTENDED RELEASE ORAL DAILY
Status: DISCONTINUED | OUTPATIENT
Start: 2024-02-25 | End: 2024-02-29

## 2024-02-25 RX ORDER — FUROSEMIDE 10 MG/ML
20 INJECTION INTRAMUSCULAR; INTRAVENOUS ONCE
Status: COMPLETED | OUTPATIENT
Start: 2024-02-25 | End: 2024-02-25

## 2024-02-25 RX ADMIN — ASPIRIN 81 MG: 81 TABLET, COATED ORAL at 11:08

## 2024-02-25 RX ADMIN — Medication 10 ML: at 00:26

## 2024-02-25 RX ADMIN — LEVETIRACETAM 1500 MG: 100 INJECTION INTRAVENOUS at 11:10

## 2024-02-25 RX ADMIN — LEVETIRACETAM 1500 MG: 100 INJECTION INTRAVENOUS at 00:25

## 2024-02-25 RX ADMIN — Medication 6000 UNITS: at 15:10

## 2024-02-25 RX ADMIN — DIBASIC SODIUM PHOSPHATE, MONOBASIC POTASSIUM PHOSPHATE AND MONOBASIC SODIUM PHOSPHATE 1 TABLET: 852; 155; 130 TABLET ORAL at 11:09

## 2024-02-25 RX ADMIN — CLOPIDOGREL BISULFATE 75 MG: 75 TABLET ORAL at 11:09

## 2024-02-25 RX ADMIN — Medication 10 ML: at 11:09

## 2024-02-25 RX ADMIN — DIBASIC SODIUM PHOSPHATE, MONOBASIC POTASSIUM PHOSPHATE AND MONOBASIC SODIUM PHOSPHATE 1 TABLET: 852; 155; 130 TABLET ORAL at 21:10

## 2024-02-25 RX ADMIN — OXYCODONE HYDROCHLORIDE AND ACETAMINOPHEN 1 TABLET: 5; 325 TABLET ORAL at 11:26

## 2024-02-25 RX ADMIN — FUROSEMIDE 20 MG: 10 INJECTION, SOLUTION INTRAMUSCULAR; INTRAVENOUS at 15:11

## 2024-02-25 RX ADMIN — POTASSIUM PHOSPHATE, MONOBASIC AND POTASSIUM PHOSPHATE, DIBASIC 30 MMOL: 224; 236 INJECTION, SOLUTION, CONCENTRATE INTRAVENOUS at 15:17

## 2024-02-25 RX ADMIN — SODIUM CHLORIDE, PRESERVATIVE FREE 40 MG: 5 INJECTION INTRAVENOUS at 11:07

## 2024-02-25 RX ADMIN — METOPROLOL SUCCINATE 25 MG: 25 TABLET, EXTENDED RELEASE ORAL at 18:50

## 2024-02-25 RX ADMIN — Medication 10 ML: at 21:10

## 2024-02-25 RX ADMIN — ROSUVASTATIN CALCIUM 40 MG: 20 TABLET, FILM COATED ORAL at 21:10

## 2024-02-25 ASSESSMENT — PAIN SCALES - GENERAL: PAINLEVEL_OUTOF10: 6

## 2024-02-25 ASSESSMENT — PAIN DESCRIPTION - FREQUENCY: FREQUENCY: INTERMITTENT

## 2024-02-25 ASSESSMENT — PAIN DESCRIPTION - ONSET: ONSET: ON-GOING

## 2024-02-25 ASSESSMENT — PAIN - FUNCTIONAL ASSESSMENT: PAIN_FUNCTIONAL_ASSESSMENT: PREVENTS OR INTERFERES SOME ACTIVE ACTIVITIES AND ADLS

## 2024-02-25 ASSESSMENT — PAIN DESCRIPTION - LOCATION: LOCATION: STERNUM;RIB CAGE

## 2024-02-25 ASSESSMENT — PAIN DESCRIPTION - DESCRIPTORS: DESCRIPTORS: ACHING;DISCOMFORT

## 2024-02-25 ASSESSMENT — PAIN DESCRIPTION - PAIN TYPE: TYPE: ACUTE PAIN

## 2024-02-26 ENCOUNTER — APPOINTMENT (OUTPATIENT)
Dept: INTERVENTIONAL RADIOLOGY/VASCULAR | Age: 69
DRG: 981 | End: 2024-02-26
Payer: COMMERCIAL

## 2024-02-26 PROBLEM — I25.10 CORONARY ARTERY DISEASE INVOLVING NATIVE CORONARY ARTERY OF NATIVE HEART WITHOUT ANGINA PECTORIS: Status: ACTIVE | Noted: 2024-02-26

## 2024-02-26 LAB
ALBUMIN FLD-MCNC: 1.1 G/DL
ALBUMIN SERPL-MCNC: 2.9 G/DL (ref 3.5–5.2)
ALP SERPL-CCNC: 208 U/L (ref 40–129)
ALT SERPL-CCNC: 35 U/L (ref 0–40)
AMYLASE FLD-CCNC: 89 U/L
ANION GAP SERPL CALCULATED.3IONS-SCNC: 10 MMOL/L (ref 7–16)
APPEARANCE FLD: CLEAR
AST SERPL-CCNC: 184 U/L (ref 0–39)
BILIRUB SERPL-MCNC: 5.9 MG/DL (ref 0–1.2)
BODY FLD TYPE: NORMAL
BUN SERPL-MCNC: 12 MG/DL (ref 6–23)
CALCIUM SERPL-MCNC: 7.9 MG/DL (ref 8.6–10.2)
CHLORIDE SERPL-SCNC: 105 MMOL/L (ref 98–107)
CLOT CHECK: NORMAL
CO2 SERPL-SCNC: 24 MMOL/L (ref 22–29)
COLOR FLD: YELLOW
CREAT SERPL-MCNC: 0.7 MG/DL (ref 0.7–1.2)
EKG ATRIAL RATE: 74 BPM
EKG ATRIAL RATE: 75 BPM
EKG P AXIS: 24 DEGREES
EKG P AXIS: 28 DEGREES
EKG P-R INTERVAL: 138 MS
EKG P-R INTERVAL: 138 MS
EKG Q-T INTERVAL: 458 MS
EKG Q-T INTERVAL: 462 MS
EKG QRS DURATION: 82 MS
EKG QRS DURATION: 84 MS
EKG QTC CALCULATION (BAZETT): 511 MS
EKG QTC CALCULATION (BAZETT): 512 MS
EKG R AXIS: -19 DEGREES
EKG R AXIS: 0 DEGREES
EKG T AXIS: -9 DEGREES
EKG T AXIS: 4 DEGREES
EKG VENTRICULAR RATE: 74 BPM
EKG VENTRICULAR RATE: 75 BPM
ERYTHROCYTE [DISTWIDTH] IN BLOOD BY AUTOMATED COUNT: 18.1 % (ref 11.5–15)
GFR SERPL CREATININE-BSD FRML MDRD: >60 ML/MIN/1.73M2
GLUCOSE SERPL-MCNC: 91 MG/DL (ref 74–99)
HCT VFR BLD AUTO: 29.2 % (ref 37–54)
HGB BLD-MCNC: 9.6 G/DL (ref 12.5–16.5)
INR PPP: 1.9
MAGNESIUM SERPL-MCNC: 1.8 MG/DL (ref 1.6–2.6)
MCH RBC QN AUTO: 33.4 PG (ref 26–35)
MCHC RBC AUTO-ENTMCNC: 32.9 G/DL (ref 32–34.5)
MCV RBC AUTO: 101.7 FL (ref 80–99.9)
MONOCYTES NFR FLD: 84 %
NEUTROPHILS NFR FLD: 16 %
PHOSPHATE SERPL-MCNC: 2 MG/DL (ref 2.5–4.5)
PLATELET, FLUORESCENCE: 104 K/UL (ref 130–450)
PMV BLD AUTO: 10.7 FL (ref 7–12)
POTASSIUM SERPL-SCNC: 4 MMOL/L (ref 3.5–5)
PROT SERPL-MCNC: 5.9 G/DL (ref 6.4–8.3)
PROT SERPL-MCNC: 6 G/DL (ref 6.4–8.3)
PROTHROMBIN TIME: 20.3 SEC (ref 9.3–12.4)
RBC # BLD AUTO: 2.87 M/UL (ref 3.8–5.8)
RBC # FLD: 2000 CELLS/UL
SODIUM SERPL-SCNC: 139 MMOL/L (ref 132–146)
SPECIMEN TYPE: NORMAL
SPECIMEN TYPE: NORMAL
WBC # FLD: 19 CELLS/UL
WBC OTHER # BLD: 8.3 K/UL (ref 4.5–11.5)

## 2024-02-26 PROCEDURE — 82150 ASSAY OF AMYLASE: CPT

## 2024-02-26 PROCEDURE — 85027 COMPLETE CBC AUTOMATED: CPT

## 2024-02-26 PROCEDURE — 2500000003 HC RX 250 WO HCPCS: Performed by: PHYSICIAN ASSISTANT

## 2024-02-26 PROCEDURE — 80053 COMPREHEN METABOLIC PANEL: CPT

## 2024-02-26 PROCEDURE — C9113 INJ PANTOPRAZOLE SODIUM, VIA: HCPCS | Performed by: INTERNAL MEDICINE

## 2024-02-26 PROCEDURE — 6370000000 HC RX 637 (ALT 250 FOR IP): Performed by: INTERNAL MEDICINE

## 2024-02-26 PROCEDURE — 85610 PROTHROMBIN TIME: CPT

## 2024-02-26 PROCEDURE — 2500000003 HC RX 250 WO HCPCS: Performed by: INTERNAL MEDICINE

## 2024-02-26 PROCEDURE — 2140000000 HC CCU INTERMEDIATE R&B

## 2024-02-26 PROCEDURE — 99233 SBSQ HOSP IP/OBS HIGH 50: CPT | Performed by: INTERNAL MEDICINE

## 2024-02-26 PROCEDURE — 93010 ELECTROCARDIOGRAM REPORT: CPT | Performed by: INTERNAL MEDICINE

## 2024-02-26 PROCEDURE — 6370000000 HC RX 637 (ALT 250 FOR IP): Performed by: STUDENT IN AN ORGANIZED HEALTH CARE EDUCATION/TRAINING PROGRAM

## 2024-02-26 PROCEDURE — 0W9G3ZZ DRAINAGE OF PERITONEAL CAVITY, PERCUTANEOUS APPROACH: ICD-10-PCS | Performed by: INTERNAL MEDICINE

## 2024-02-26 PROCEDURE — A4216 STERILE WATER/SALINE, 10 ML: HCPCS | Performed by: INTERNAL MEDICINE

## 2024-02-26 PROCEDURE — 36415 COLL VENOUS BLD VENIPUNCTURE: CPT

## 2024-02-26 PROCEDURE — 49083 ABD PARACENTESIS W/IMAGING: CPT

## 2024-02-26 PROCEDURE — 2580000003 HC RX 258: Performed by: INTERNAL MEDICINE

## 2024-02-26 PROCEDURE — C1729 CATH, DRAINAGE: HCPCS

## 2024-02-26 PROCEDURE — P9047 ALBUMIN (HUMAN), 25%, 50ML: HCPCS | Performed by: PHYSICIAN ASSISTANT

## 2024-02-26 PROCEDURE — 83735 ASSAY OF MAGNESIUM: CPT

## 2024-02-26 PROCEDURE — 82042 OTHER SOURCE ALBUMIN QUAN EA: CPT

## 2024-02-26 PROCEDURE — 89051 BODY FLUID CELL COUNT: CPT

## 2024-02-26 PROCEDURE — 6360000002 HC RX W HCPCS: Performed by: INTERNAL MEDICINE

## 2024-02-26 PROCEDURE — 99232 SBSQ HOSP IP/OBS MODERATE 35: CPT | Performed by: INTERNAL MEDICINE

## 2024-02-26 PROCEDURE — 6360000002 HC RX W HCPCS: Performed by: PHYSICIAN ASSISTANT

## 2024-02-26 PROCEDURE — 84155 ASSAY OF PROTEIN SERUM: CPT

## 2024-02-26 PROCEDURE — 84100 ASSAY OF PHOSPHORUS: CPT

## 2024-02-26 RX ORDER — LIDOCAINE HYDROCHLORIDE 20 MG/ML
INJECTION, SOLUTION INFILTRATION; PERINEURAL PRN
Status: COMPLETED | OUTPATIENT
Start: 2024-02-26 | End: 2024-02-26

## 2024-02-26 RX ORDER — ALBUMIN (HUMAN) 12.5 G/50ML
SOLUTION INTRAVENOUS CONTINUOUS PRN
Status: COMPLETED | OUTPATIENT
Start: 2024-02-26 | End: 2024-02-26

## 2024-02-26 RX ORDER — MAGNESIUM SULFATE IN WATER 40 MG/ML
2000 INJECTION, SOLUTION INTRAVENOUS ONCE
Status: DISCONTINUED | OUTPATIENT
Start: 2024-02-26 | End: 2024-02-29 | Stop reason: HOSPADM

## 2024-02-26 RX ORDER — ALBUMIN (HUMAN) 12.5 G/50ML
25 SOLUTION INTRAVENOUS EVERY 8 HOURS
Status: DISCONTINUED | OUTPATIENT
Start: 2024-02-27 | End: 2024-02-29 | Stop reason: HOSPADM

## 2024-02-26 RX ORDER — MAGNESIUM SULFATE IN WATER 40 MG/ML
2000 INJECTION, SOLUTION INTRAVENOUS ONCE
Status: COMPLETED | OUTPATIENT
Start: 2024-02-26 | End: 2024-02-26

## 2024-02-26 RX ADMIN — CLOPIDOGREL BISULFATE 75 MG: 75 TABLET ORAL at 13:45

## 2024-02-26 RX ADMIN — LEVETIRACETAM 1500 MG: 100 INJECTION INTRAVENOUS at 13:24

## 2024-02-26 RX ADMIN — ALBUMIN (HUMAN) 25 G: 25 SOLUTION INTRAVENOUS at 11:03

## 2024-02-26 RX ADMIN — LIDOCAINE HYDROCHLORIDE 10 ML: 20 INJECTION, SOLUTION INFILTRATION; PERINEURAL at 10:39

## 2024-02-26 RX ADMIN — OXYCODONE HYDROCHLORIDE AND ACETAMINOPHEN 1 TABLET: 5; 325 TABLET ORAL at 22:53

## 2024-02-26 RX ADMIN — SODIUM CHLORIDE, PRESERVATIVE FREE 40 MG: 5 INJECTION INTRAVENOUS at 13:36

## 2024-02-26 RX ADMIN — METOPROLOL SUCCINATE 25 MG: 25 TABLET, EXTENDED RELEASE ORAL at 13:44

## 2024-02-26 RX ADMIN — Medication 10 ML: at 13:27

## 2024-02-26 RX ADMIN — POTASSIUM PHOSPHATE, MONOBASIC AND POTASSIUM PHOSPHATE, DIBASIC 20 MMOL: 224; 236 INJECTION, SOLUTION, CONCENTRATE INTRAVENOUS at 13:55

## 2024-02-26 RX ADMIN — MAGNESIUM SULFATE HEPTAHYDRATE 2000 MG: 40 INJECTION, SOLUTION INTRAVENOUS at 14:00

## 2024-02-26 RX ADMIN — ASPIRIN 81 MG: 81 TABLET, COATED ORAL at 13:44

## 2024-02-26 RX ADMIN — Medication 6000 UNITS: at 13:42

## 2024-02-26 RX ADMIN — DIBASIC SODIUM PHOSPHATE, MONOBASIC POTASSIUM PHOSPHATE AND MONOBASIC SODIUM PHOSPHATE 1 TABLET: 852; 155; 130 TABLET ORAL at 22:49

## 2024-02-26 RX ADMIN — OXYCODONE HYDROCHLORIDE AND ACETAMINOPHEN 1 TABLET: 5; 325 TABLET ORAL at 00:50

## 2024-02-26 RX ADMIN — DIBASIC SODIUM PHOSPHATE, MONOBASIC POTASSIUM PHOSPHATE AND MONOBASIC SODIUM PHOSPHATE 1 TABLET: 852; 155; 130 TABLET ORAL at 13:46

## 2024-02-26 RX ADMIN — OXYCODONE HYDROCHLORIDE AND ACETAMINOPHEN 1 TABLET: 5; 325 TABLET ORAL at 10:14

## 2024-02-26 RX ADMIN — ROSUVASTATIN CALCIUM 40 MG: 20 TABLET, FILM COATED ORAL at 22:49

## 2024-02-26 RX ADMIN — LEVETIRACETAM 1500 MG: 100 INJECTION INTRAVENOUS at 00:41

## 2024-02-26 RX ADMIN — SACUBITRIL AND VALSARTAN 1 TABLET: 24; 26 TABLET, FILM COATED ORAL at 22:49

## 2024-02-26 RX ADMIN — LEVETIRACETAM 1500 MG: 100 INJECTION INTRAVENOUS at 23:57

## 2024-02-26 RX ADMIN — Medication 10 ML: at 22:48

## 2024-02-26 ASSESSMENT — PAIN DESCRIPTION - FREQUENCY: FREQUENCY: INTERMITTENT

## 2024-02-26 ASSESSMENT — PAIN DESCRIPTION - LOCATION
LOCATION: CHEST;BACK
LOCATION: ABDOMEN
LOCATION: ABDOMEN

## 2024-02-26 ASSESSMENT — PAIN DESCRIPTION - ORIENTATION: ORIENTATION: RIGHT;LEFT

## 2024-02-26 ASSESSMENT — PAIN SCALES - GENERAL
PAINLEVEL_OUTOF10: 6
PAINLEVEL_OUTOF10: 2
PAINLEVEL_OUTOF10: 2
PAINLEVEL_OUTOF10: 7
PAINLEVEL_OUTOF10: 4
PAINLEVEL_OUTOF10: 0

## 2024-02-26 ASSESSMENT — PAIN DESCRIPTION - PAIN TYPE: TYPE: ACUTE PAIN

## 2024-02-26 ASSESSMENT — PAIN DESCRIPTION - DESCRIPTORS
DESCRIPTORS: ACHING;SORE
DESCRIPTORS: ACHING;DISCOMFORT
DESCRIPTORS: ACHING

## 2024-02-26 ASSESSMENT — PAIN DESCRIPTION - ONSET: ONSET: ON-GOING

## 2024-02-26 ASSESSMENT — PAIN - FUNCTIONAL ASSESSMENT: PAIN_FUNCTIONAL_ASSESSMENT: PREVENTS OR INTERFERES SOME ACTIVE ACTIVITIES AND ADLS

## 2024-02-26 NOTE — CARE COORDINATION
2/26/24, SW/CM met with patient in room on discharge plan.  Per previous CM discharge plan is to [facility].  Patient appeared to be confused.  Call placed to Vicente-brother on discharge plan to [facility].  Vicente in agreement with discharge plan.  Confirmed with Ghazala from [facility] that precert has been obtained and is good through 2/29/24.  Ambulance form (will need completed on day of discharge), face sheet, PAS/RR and envelope is on soft chart.  Nursing Updated.  SW to follow.      Natalee Frausto VIKAS  [facility] Case Management  [phone]

## 2024-02-26 NOTE — OP NOTE
Operative Note  ______________________________________________________________      IR U/S GUIDED PARACENTESIS  SEYZ 6W PCCU2    Patient Name: Shahzad Rodgers   YOB: 1955  Medical Record Number: [id number]  Date of Procedure: 2/26/24  Room/Bed: [address] [place] [address]    Pre-operative Diagnosis: Ascites    Post-operative Diagnosis: Ascites    Consent: Informed consent was obtained from the patient prior to the procedure. The details of the procedure, as well is its risks, benefits, and alternatives, were explained.      Anesthesia: Local anesthesia    Performed by: JUAN ALBERTO Morales under on-site supervision by Justin Rapp MD.    Estimated blood loss: Minimal    Complications: None    Specimens Obtained: Ascites Fluid    Procedure: Routine scanning of all four abdominal quadrants was performed using real-time ultrasound and revealed sufficient amount of ascites fluid present.  Decision was made to proceed with procedure.  After obtaining consent, a \"Time-Out\" was called to verify the correct patient, procedure/location, allergies, relevant medications held for procedure and that all equipment is functioning and available. The patient was then placed in the supine position with the head of the bed slightly elevated and the appropriate landmarks were identified. The skin over the puncture site in the right lower quadrant region was prepped with betadine and draped in a sterile fashion. Local anesthesia was obtained by infiltration using 2% Lidocaine without epinephrine. A 5 Azeri needle sheath catheter was then advanced into the abdominal cavity. Fluid return was clear straw colored.      A total volume of  6100mL was withdrawn. The catheter was then withdrawn and a sterile dressing was placed over the site. The patient tolerated the procedure well.     Complications: None.     Estimated Blood Loss: < 10cc.     25g IV Albumin given.       Electronically signed by JUAN ALBERTO Morales   DD: 2/26/24  12:48 PM

## 2024-02-26 NOTE — BRIEF OP NOTE
Brief-Op Note  ______________________________________________________________      IR U/S GUIDED PARACENTESIS  SEYZ 6W PCCU2    Patient Name: Shahzad Rodgers   YOB: 1955  Medical Record Number: [id number]  Date of Procedure: 2/26/24  Room/Bed: [address] [place] [address]      Pre-operative Diagnosis: Ascites    Post-operative Diagnosis: Ascites    Consent: Informed consent was obtained from the patient prior to the procedure. The details of the procedure, as well is its risks, benefits, and alternatives, were explained.      Anesthesia: Local anesthesia.    Performed by: JUAN ALBERTO Morales under on-site supervision by Justin Rapp MD.    Estimated blood loss: Minimal    Complications: None    Specimen Obtained: 6100mL of clear straw colored ascites fluid was withdrawn.    (See radiology dictation in PACs for image review and additional procedural information)    Electronically signed by JUAN ALBERTO Morales   DD: 2/26/24  12:48 PM

## 2024-02-26 NOTE — PROCEDURES
Spoke with RN regarding patient's ordered paracentesis. Keep patient NPO, and hold all thinners for procedure. Patient is able to sign consent.

## 2024-02-26 NOTE — INTERVAL H&P NOTE
IR H&P UPDATE NOTE  SEYZ 6W PCCU2    Patient's History and Physical Examination were reviewed from current hospital admission records.    Shahzad Rodgers appears likely to able to tolerate the requested Paracentesis procedure by the consultant.    Risk and benefits were discussed with patient including ultimate complications, possibly death and consent was obtained.    Electronically signed by JUAN ALBERTO Morales   DD: 2/26/24  12:48 PM

## 2024-02-27 LAB
ALBUMIN SERPL-MCNC: 3.2 G/DL (ref 3.5–5.2)
ALP SERPL-CCNC: 189 U/L (ref 40–129)
ALT SERPL-CCNC: 27 U/L (ref 0–40)
ANION GAP SERPL CALCULATED.3IONS-SCNC: 9 MMOL/L (ref 7–16)
AST SERPL-CCNC: 148 U/L (ref 0–39)
BILIRUB SERPL-MCNC: 5.3 MG/DL (ref 0–1.2)
BNP SERPL-MCNC: 1801 PG/ML (ref 0–125)
BUN SERPL-MCNC: 10 MG/DL (ref 6–23)
CALCIUM SERPL-MCNC: 8.1 MG/DL (ref 8.6–10.2)
CHLORIDE SERPL-SCNC: 107 MMOL/L (ref 98–107)
CO2 SERPL-SCNC: 22 MMOL/L (ref 22–29)
CREAT SERPL-MCNC: 0.6 MG/DL (ref 0.7–1.2)
EKG ATRIAL RATE: 64 BPM
EKG ATRIAL RATE: 74 BPM
EKG P AXIS: 12 DEGREES
EKG P AXIS: 35 DEGREES
EKG P-R INTERVAL: 140 MS
EKG P-R INTERVAL: 148 MS
EKG Q-T INTERVAL: 454 MS
EKG Q-T INTERVAL: 468 MS
EKG QRS DURATION: 78 MS
EKG QRS DURATION: 82 MS
EKG QTC CALCULATION (BAZETT): 482 MS
EKG QTC CALCULATION (BAZETT): 503 MS
EKG R AXIS: -19 DEGREES
EKG R AXIS: -4 DEGREES
EKG T AXIS: -12 DEGREES
EKG VENTRICULAR RATE: 64 BPM
EKG VENTRICULAR RATE: 74 BPM
ERYTHROCYTE [DISTWIDTH] IN BLOOD BY AUTOMATED COUNT: 17.6 % (ref 11.5–15)
GFR SERPL CREATININE-BSD FRML MDRD: >60 ML/MIN/1.73M2
GLUCOSE SERPL-MCNC: 96 MG/DL (ref 74–99)
HCT VFR BLD AUTO: 28.5 % (ref 37–54)
HGB BLD-MCNC: 9.5 G/DL (ref 12.5–16.5)
MAGNESIUM SERPL-MCNC: 1.8 MG/DL (ref 1.6–2.6)
MCH RBC QN AUTO: 34.1 PG (ref 26–35)
MCHC RBC AUTO-ENTMCNC: 33.3 G/DL (ref 32–34.5)
MCV RBC AUTO: 102.2 FL (ref 80–99.9)
PHOSPHATE SERPL-MCNC: 1.9 MG/DL (ref 2.5–4.5)
PLATELET CONFIRMATION: NORMAL
PLATELET, FLUORESCENCE: 84 K/UL (ref 130–450)
PMV BLD AUTO: 11.1 FL (ref 7–12)
POTASSIUM SERPL-SCNC: 3.9 MMOL/L (ref 3.5–5)
PROT SERPL-MCNC: 5.7 G/DL (ref 6.4–8.3)
RBC # BLD AUTO: 2.79 M/UL (ref 3.8–5.8)
SODIUM SERPL-SCNC: 138 MMOL/L (ref 132–146)
WBC OTHER # BLD: 7.4 K/UL (ref 4.5–11.5)

## 2024-02-27 PROCEDURE — 6370000000 HC RX 637 (ALT 250 FOR IP): Performed by: INTERNAL MEDICINE

## 2024-02-27 PROCEDURE — 6370000000 HC RX 637 (ALT 250 FOR IP): Performed by: STUDENT IN AN ORGANIZED HEALTH CARE EDUCATION/TRAINING PROGRAM

## 2024-02-27 PROCEDURE — 6360000002 HC RX W HCPCS: Performed by: INTERNAL MEDICINE

## 2024-02-27 PROCEDURE — 99233 SBSQ HOSP IP/OBS HIGH 50: CPT | Performed by: INTERNAL MEDICINE

## 2024-02-27 PROCEDURE — 83880 ASSAY OF NATRIURETIC PEPTIDE: CPT

## 2024-02-27 PROCEDURE — 2500000003 HC RX 250 WO HCPCS: Performed by: INTERNAL MEDICINE

## 2024-02-27 PROCEDURE — 93010 ELECTROCARDIOGRAM REPORT: CPT | Performed by: INTERNAL MEDICINE

## 2024-02-27 PROCEDURE — A4216 STERILE WATER/SALINE, 10 ML: HCPCS | Performed by: INTERNAL MEDICINE

## 2024-02-27 PROCEDURE — 85027 COMPLETE CBC AUTOMATED: CPT

## 2024-02-27 PROCEDURE — 2140000000 HC CCU INTERMEDIATE R&B

## 2024-02-27 PROCEDURE — 83735 ASSAY OF MAGNESIUM: CPT

## 2024-02-27 PROCEDURE — 93005 ELECTROCARDIOGRAM TRACING: CPT | Performed by: INTERNAL MEDICINE

## 2024-02-27 PROCEDURE — C9113 INJ PANTOPRAZOLE SODIUM, VIA: HCPCS | Performed by: INTERNAL MEDICINE

## 2024-02-27 PROCEDURE — 2580000003 HC RX 258: Performed by: INTERNAL MEDICINE

## 2024-02-27 PROCEDURE — 6360000002 HC RX W HCPCS: Performed by: NURSE PRACTITIONER

## 2024-02-27 PROCEDURE — 84100 ASSAY OF PHOSPHORUS: CPT

## 2024-02-27 PROCEDURE — 99232 SBSQ HOSP IP/OBS MODERATE 35: CPT | Performed by: INTERNAL MEDICINE

## 2024-02-27 PROCEDURE — 99232 SBSQ HOSP IP/OBS MODERATE 35: CPT | Performed by: NURSE PRACTITIONER

## 2024-02-27 PROCEDURE — 80053 COMPREHEN METABOLIC PANEL: CPT

## 2024-02-27 PROCEDURE — 36415 COLL VENOUS BLD VENIPUNCTURE: CPT

## 2024-02-27 PROCEDURE — P9047 ALBUMIN (HUMAN), 25%, 50ML: HCPCS | Performed by: NURSE PRACTITIONER

## 2024-02-27 RX ORDER — LEVETIRACETAM 500 MG/1
1500 TABLET ORAL 2 TIMES DAILY
Qty: 60 TABLET | Refills: 3 | Status: CANCELLED | DISCHARGE
Start: 2024-02-27

## 2024-02-27 RX ORDER — MAGNESIUM SULFATE IN WATER 40 MG/ML
2000 INJECTION, SOLUTION INTRAVENOUS ONCE
Status: COMPLETED | OUTPATIENT
Start: 2024-02-27 | End: 2024-02-27

## 2024-02-27 RX ORDER — ROSUVASTATIN CALCIUM 40 MG/1
40 TABLET, COATED ORAL NIGHTLY
Qty: 30 TABLET | Refills: 3 | DISCHARGE
Start: 2024-02-27

## 2024-02-27 RX ORDER — CALCIUM CARBONATE 500 MG/1
500 TABLET, CHEWABLE ORAL 3 TIMES DAILY PRN
DISCHARGE
Start: 2024-02-27 | End: 2024-03-28

## 2024-02-27 RX ORDER — ROSUVASTATIN CALCIUM 40 MG/1
40 TABLET, COATED ORAL NIGHTLY
Qty: 30 TABLET | Refills: 3 | Status: CANCELLED | OUTPATIENT
Start: 2024-02-27

## 2024-02-27 RX ORDER — CLOPIDOGREL BISULFATE 75 MG/1
75 TABLET ORAL DAILY
Qty: 30 TABLET | Refills: 3 | DISCHARGE
Start: 2024-02-28

## 2024-02-27 RX ORDER — FUROSEMIDE 40 MG/1
40 TABLET ORAL DAILY
Qty: 60 TABLET | Refills: 3 | DISCHARGE
Start: 2024-02-28 | End: 2024-02-28 | Stop reason: HOSPADM

## 2024-02-27 RX ORDER — LEVETIRACETAM 500 MG/1
1500 TABLET ORAL 2 TIMES DAILY
Status: DISCONTINUED | OUTPATIENT
Start: 2024-02-27 | End: 2024-02-29 | Stop reason: HOSPADM

## 2024-02-27 RX ORDER — METOPROLOL SUCCINATE 25 MG/1
25 TABLET, EXTENDED RELEASE ORAL DAILY
Qty: 30 TABLET | Refills: 3 | DISCHARGE
Start: 2024-02-28 | End: 2024-02-29 | Stop reason: HOSPADM

## 2024-02-27 RX ORDER — PANTOPRAZOLE SODIUM 40 MG/1
40 TABLET, DELAYED RELEASE ORAL
Status: DISCONTINUED | OUTPATIENT
Start: 2024-02-28 | End: 2024-02-29 | Stop reason: HOSPADM

## 2024-02-27 RX ORDER — FUROSEMIDE 40 MG/1
40 TABLET ORAL DAILY
Status: DISCONTINUED | OUTPATIENT
Start: 2024-02-28 | End: 2024-02-28

## 2024-02-27 RX ORDER — HYDROXYZINE PAMOATE 25 MG/1
25 CAPSULE ORAL 3 TIMES DAILY PRN
DISCHARGE
Start: 2024-02-27 | End: 2024-03-12

## 2024-02-27 RX ORDER — METOPROLOL SUCCINATE 25 MG/1
25 TABLET, EXTENDED RELEASE ORAL DAILY
Qty: 30 TABLET | Refills: 3 | Status: CANCELLED | OUTPATIENT
Start: 2024-02-28

## 2024-02-27 RX ORDER — LEVETIRACETAM 750 MG/1
1500 TABLET ORAL 2 TIMES DAILY
Qty: 60 TABLET | Refills: 3 | DISCHARGE
Start: 2024-02-27

## 2024-02-27 RX ORDER — ASPIRIN 81 MG/1
81 TABLET ORAL DAILY
Qty: 30 TABLET | Refills: 3 | DISCHARGE
Start: 2024-02-28

## 2024-02-27 RX ORDER — BENZONATATE 100 MG/1
100 CAPSULE ORAL 3 TIMES DAILY PRN
DISCHARGE
Start: 2024-02-27 | End: 2024-03-05

## 2024-02-27 RX ADMIN — CLOPIDOGREL BISULFATE 75 MG: 75 TABLET ORAL at 08:31

## 2024-02-27 RX ADMIN — ALBUMIN (HUMAN) 25 G: 0.25 INJECTION, SOLUTION INTRAVENOUS at 20:57

## 2024-02-27 RX ADMIN — DIBASIC SODIUM PHOSPHATE, MONOBASIC POTASSIUM PHOSPHATE AND MONOBASIC SODIUM PHOSPHATE 1 TABLET: 852; 155; 130 TABLET ORAL at 08:31

## 2024-02-27 RX ADMIN — ALBUMIN (HUMAN) 25 G: 0.25 INJECTION, SOLUTION INTRAVENOUS at 00:08

## 2024-02-27 RX ADMIN — ALBUMIN (HUMAN) 25 G: 0.25 INJECTION, SOLUTION INTRAVENOUS at 08:41

## 2024-02-27 RX ADMIN — LEVETIRACETAM 1500 MG: 100 INJECTION INTRAVENOUS at 13:18

## 2024-02-27 RX ADMIN — MAGNESIUM SULFATE HEPTAHYDRATE 2000 MG: 40 INJECTION, SOLUTION INTRAVENOUS at 11:14

## 2024-02-27 RX ADMIN — SODIUM CHLORIDE, PRESERVATIVE FREE 40 MG: 5 INJECTION INTRAVENOUS at 08:31

## 2024-02-27 RX ADMIN — SACUBITRIL AND VALSARTAN 1 TABLET: 24; 26 TABLET, FILM COATED ORAL at 08:31

## 2024-02-27 RX ADMIN — Medication 10 ML: at 21:51

## 2024-02-27 RX ADMIN — DIBASIC SODIUM PHOSPHATE, MONOBASIC POTASSIUM PHOSPHATE AND MONOBASIC SODIUM PHOSPHATE 1 TABLET: 852; 155; 130 TABLET ORAL at 23:17

## 2024-02-27 RX ADMIN — Medication 10 ML: at 08:38

## 2024-02-27 RX ADMIN — LEVETIRACETAM 1500 MG: 500 TABLET, FILM COATED ORAL at 23:17

## 2024-02-27 RX ADMIN — OXYCODONE HYDROCHLORIDE AND ACETAMINOPHEN 1 TABLET: 5; 325 TABLET ORAL at 15:09

## 2024-02-27 RX ADMIN — ROSUVASTATIN CALCIUM 40 MG: 20 TABLET, FILM COATED ORAL at 23:17

## 2024-02-27 RX ADMIN — METOPROLOL SUCCINATE 25 MG: 25 TABLET, EXTENDED RELEASE ORAL at 08:32

## 2024-02-27 RX ADMIN — SACUBITRIL AND VALSARTAN 1 TABLET: 24; 26 TABLET, FILM COATED ORAL at 23:17

## 2024-02-27 RX ADMIN — Medication 6000 UNITS: at 09:18

## 2024-02-27 RX ADMIN — ASPIRIN 81 MG: 81 TABLET, COATED ORAL at 08:31

## 2024-02-27 RX ADMIN — POTASSIUM PHOSPHATE, MONOBASIC AND POTASSIUM PHOSPHATE, DIBASIC 20 MMOL: 224; 236 INJECTION, SOLUTION, CONCENTRATE INTRAVENOUS at 15:07

## 2024-02-27 RX ADMIN — OXYCODONE HYDROCHLORIDE AND ACETAMINOPHEN 1 TABLET: 5; 325 TABLET ORAL at 08:32

## 2024-02-27 ASSESSMENT — PAIN SCALES - GENERAL
PAINLEVEL_OUTOF10: 0
PAINLEVEL_OUTOF10: 0
PAINLEVEL_OUTOF10: 5
PAINLEVEL_OUTOF10: 0
PAINLEVEL_OUTOF10: 7

## 2024-02-27 ASSESSMENT — PAIN DESCRIPTION - DESCRIPTORS
DESCRIPTORS: ACHING;DISCOMFORT;DULL
DESCRIPTORS: ACHING;DULL

## 2024-02-27 ASSESSMENT — PAIN DESCRIPTION - ORIENTATION
ORIENTATION: MID
ORIENTATION: LEFT

## 2024-02-27 ASSESSMENT — PAIN DESCRIPTION - LOCATION: LOCATION: CHEST

## 2024-02-27 ASSESSMENT — PAIN - FUNCTIONAL ASSESSMENT: PAIN_FUNCTIONAL_ASSESSMENT: ACTIVITIES ARE NOT PREVENTED

## 2024-02-27 NOTE — CARE COORDINATION
2/27 Care Coordination:  Pt remains in CSU. Order for Life Vest, CM called [employer] [facility]. Discharge plan remains [facility]. CM spoke with [facility] and the facility can accept pt with Life Vest. Precert has been obtained and is good through 2/29/24. Ambulance form (will need completed on day of discharge), face sheet, PAS/RR and envelope is on soft chart. CM/SW will continue to follow for discharge planning.   Jairo YANEZN,RN-CV-BC  [phone]

## 2024-02-27 NOTE — CARE COORDINATION
2/27/24 Received call from .  Facility unable to accept pt at this time.  PAS transportation cancelled, bedside RN notified.   Antonieta Lozano BSN RN-BC  [phone]

## 2024-02-27 NOTE — CONSULTS
[facility]                  [address] [place] [address]                                  CONSULTATION    PATIENT NAME: YOAV MUNOZ                    :        1955  MED REC NO:   [id number]                            ROOM:       [id number]  ACCOUNT NO:   [id number]                           ADMIT DATE: 2024  PROVIDER:     Goldy Gaytan MD    CONSULT DATE:  02/15/2024    He is currently in [facility].    CHIEF COMPLAINT:  Hematuria.    This is a 68-year-old male who was admitted to the hospital on  2024 with a chief complaint of fatigue with a history of  cirrhosis.    HISTORY OF PRESENT ILLNESS:   This is a 68-year-old male who presented  to the emergency department on 2024 with weakness and low blood  pressure, was found to have a low hemoglobin, and blood was administered  in the emergency room.  The patient has a history of having cirrhosis of  the liver with esophageal varices, portal hypertension.  The patient had  a CAT scan of his abdomen which did not show any renal lesions nor did  it show any bladder lesions.  The patient was found to have microscopic  hematuria.  He denies gross hematuria.  He also states that he is not  bothered by any urinary voiding issues.  He denies specifically  incontinence.    PAST SURGICAL HISTORY:  Includes colonoscopy, paracentesis,  prostatectomy in the past, although he is not able to give any details  of this.  He also has had upper and lower endoscopy.    ALLERGIES:  PENICILLIN.    FAMILY HISTORY:  Diabetes in his mother.    MEDICATIONS:  Include Lasix, Corgard, pantoprazole, potassium chloride.    SOCIAL HISTORY:  Patient has a history of alcohol abuse.  He also has  been a cigarette smoker for over 50 years.    PHYSICAL EXAMINATION:  GENERAL:  The patient is alert, oriented.  ABDOMEN:  Distended, but not tender.  GENITALIA:  Penis circumcised.  Testes are normal.    IMPRESSION:  
      [facility]   Inpatient CHF Nurse Navigator Consult      Cardiologist: Dr. Schilling (New)    Shahzad Rodgers is a 68 y.o. (1955) male with a history of HFrEF, most recent EF: 35-40% 2/19/24  Lab Results   Component Value Date    LVEF 63 05/03/2023       Patient was awake and alert, laying in bed during the consultation and is agreeable to heart failure education. He was engaged and asked appropriate questions throughout the education session. I spoke to pts primary nurse and he is more alert today. He is agreeable to HF education. He is being discharged to [facility]. He has been ordered a Lifevest. He is agreeable to follow up in the CHF clinic. Post hospital follow up appointments scheduled.     Barriers identified during consult contributing to HF Hospitalization:  [] Limited medication adherence   [] Poor health literacy, education regarding HF medications provided   [] Pill box provided to patient  [] Difficulty affording medications  [] Difficulty obtaining/ managing medications  [] Prescription assistance information given     [] Not weighing themselves daily  [x] Weight log provided for easy monitoring  [] Scale provided     [] Not following low sodium diet  [] Food insecurity   [x] 2 gram sodium diet education provided   [] Low sodium recipes provided  [] Sodium free seasoning provided   [] Low sodium meal delivery options given to patient  [] Dietician consulted     [] Lack of transportation to appointments     [] Depression, given chronic illness  [] Primary team notified     [] Goals of care need addressed  [] Palliative care consulted     [] CHF CHW consulted, to assist with         Chart Reviewed:  Diet: ADULT DIET; Regular; Low Fat/Low Chol/High Fiber/SCOTTY   Daily Weights: No data found.  I/O:   Intake/Output Summary (Last 24 hours) at 2/27/2024 1412  Last data filed at 2/27/2024 0955  Gross per 24 hour   Intake 170 ml   Output 2825 ml   Net -2655 ml 
      Inpatient Cardiology Consultation      Reason for Consult:  New CHF    Consulting Physician: Dr Schilling    Requesting Physician:  Collins JAEGER    Date of Consultation: 2/17/2024    HISTORY OF PRESENT ILLNESS: 69 yo AAM not previously known to any cardiologist.    PMH: Alcoholic cirrhosis, esophageal varices, ETOH abuse abstinent since 1/2024, hx paracentesis, ongoing tobacco abuse, and anemia    1/16/2024 EGD/Colonoscopy:  Grade II esophageal varices, Portal hypertensive gastropathy.  Moderate portal hypertensive gastropathy with actively bleeding focal spots treated with Gold probe. Colon polyps s/p polypectomy.    1/26/2024 Paracentesis >1175 mls    [facility]-ED 2/14/2024 /65 HR 70's SR, afebrile, and O2 saturation 95% on RA.  Per ED HPI sent in by PCP for confusion, hypotension, jaundice and fatigue. Patient admitted to ED staff he has had some falls recently. Denied any CP, SOB, or dizziness upon arrival to ED. EKG SR rate 75. No ischemic EKG changes.     Hgb 11.8, down 2 grams from 1 month ago, with the patient having a lactic of 6, a unit of blood and liter of fluid was ordered.  INR is 1.3    2/14/2023 1 unit PRBC    CT Chest W read as No acute cardiopulmonary process. Small left pleural effusion.  Mild cardiomegaly with slight dilatation of the left ventricle. Mild diffuse wall prominence of the esophagus could reflect esophagitis in the proper clinical setting. Cirrhosis with mild upper abdominal ascites. Bilateral gynecomastia.    CT Abdomen Pelvis W read as No evidence of acute trauma. Moderate to large volume 4 quadrant ascites with cirrhotic appearance of the liver. Left pleural effusion.   Thickening of the ascending colon. Gallbladder distension with minimal gallbladder wall thickening. Sub centimeter nonobstructing left renal calculi.    CT head and Cervical Spine: nothing acute  CT Thoracic and Lumbar spine nothing acute    ED received 1 liter NSS and 40 mEq KCL     2/15/2024 RRT called 
In Error   
[facility]   Gastroenterology, Hepatology, &  Advanced Endoscopy    Consult     Reason for Consult: Decompensated Cirrhosis    ASSESSMENT AND PLAN:     68y/M w/ history of EtOH cirrhosis presents to the ED w/ hypotension and is found to be in polymorphic VT requiring ACLS now s/p PCI w/ MIKE.     MELD: 21    PLAN:  Cirrhosis:  - Expected decompensated state in the setting of recent cardiac arrest and multi-vessel coronary disease requiring intervention.   - Labs daily.     2. Ascites:  - Will ask IR if they are willing to perform paracentesis on DAPT.  - Was unable to tolerate spironolactone in the past due to gynecomastia.   - Uptitrate Lasix as tolerated. May require change to Bumex.     3. Varices:  - Will ask Cardiology if okay to change metoprolol to carvedilol.    4. Portal Hypertensive Gastropathy:  - PPI daily.     We will follow closely.    Thank you for including us in the care of this patient. Please do not hesitate to contact us with any additional questions or concerns.    Leroy Hernandez MD  Gastroenterology/Hepatology  Advanced Endoscopy      HISTORY OF PRESENT ILLNESS:      Mr. Shahzad Rodgers is a 68y/M w/ history of EtOH cirrhosis, prostate cancer s/p prostatectomy who was sent to the ED from clinic on 2/16 for AMS and hypotension who eventually had polymorphic VT c/b cardiac arrest requiring CPR and defibrillation with ROSC achieved in 4 minutes. He underwent cardiac evaluation and was found to have multi-vessel disease now s/p PCI w/ MIKE on DAPT. He is being followed closely by Cardiology.     Cirrhosis:  Etiology: EtOH  HE: No history.  EV: Grade II varices on EGD in Jan 2024 with no high risk features.   Ascites: Recurrent issue. On diuretics as tolerated. Also requires regular outpatient paracentesis.   HCC: No focal lesions on CT w/ contrast this admission.   CRC: Colonoscopy in Jan 2024 with three polyps resected.     Vitals: HDS and afebrile.    Labwork personally reviewed: Na 138, Cr 0.5, 
Met with patient and discussed that their physician has ordered a referral to our outpatient Phase II Cardiac Rehabilitation program. Reviewed the benefits of cardiac rehabilitation based on their diagnosis and personal risk factors. Patient demonstrates mild interest in Cardiac Rehabilitation at this time.  Cardiac Rehabilitation brochure provided to patient/family. The Cardiac Rehabilitation Program has been provided the patient's referral information and pertinent patient details and history. The patient may call [facility] Cardiac Rehabilitation at [phone] for additional information or questions. Contact information for [facility] Cardiac Rehabilitation and other choices close to the patient's residence have been provided in the discharge instructions so that the patient may call and schedule an appointment when cleared by their physician. Thank you for the referral.  
[facility]  Consultation Report  PATIENT: Shahzad Rodgers  MEDICAL RECORD NUMBER: [id number]  DATE OF SERVICE:  2/16/2024  ATTENDING ELECTROPHYSIOLOGIST: Yessica Ross MD  PRIMARY ELECTROPHYSIOLOGIST: Yessica Ross MD  REFERRING PHYSICIAN: No ref. provider found and Fly Sequeira MD  CHIEF COMPLAINT: Generalized fatigue    HPI: This is a 68 y.o. male with a history of   Patient Active Problem List   Diagnosis    Ascites of liver    Cirrhosis (HCC)    Prostate cancer (HCC)    Esophageal varices without bleeding (HCC)    Alcoholic cirrhosis of liver with ascites (HCC)    History of prostate cancer    Gynecomastia    Syncope and collapse    Upper GI bleeding   who presented to the hospital complaining of fatigue. The patient has history of alcoholic cirrhosis of liver with ascites, esophageal varices, upper GI bleeding, multiple falls and prostate cancer. The patient presented to the hospital 2/14/24 complaining of fatigue, recurrent fall and syncope. In ED he was noted be hypokalemic at 3.0 He was admitted to the medicine floor. He had unwitnessed fall while he got up to the bathroom. He was noted to have seizure activity and RRT was called. He underwent CT scan of the brain which showed no acute intracranial abnormality. This morning, 2/16/24, at 7.49 PM, code blue was called and the patient was found to be in ventricular fubrillation. ACLS was initiated. 1 mg of IV Epinephrine, 2 grams of IV magnesium sulfate were given and 200 J defibrillation was performed with ROSC in 4 minutes. He was intubated and placed on the ventilator, Blood test was performed and showed potassium of 2.9. Magnesium was 1.4 on 2/15/24 and was 2.3 after he was given 2 gram of magnesium sulfate during the code. Reviewed of telemetry monitor before cardiac arrest. His QTc was noted to be prolonged up to 640 ms at 5.24 AM of 2/16/24 and at 7.43 AM, he developed 
vertebral body heights are maintained. No osseous destructive lesion is seen. DEGENERATIVE CHANGES: Mild multilevel degenerative changes. SOFT TISSUES: No paraspinal mass is seen. MISCELLANEOUS: Left pleural effusion.  Cardiomegaly.     No evidence of acute thoracic spine trauma. Mild multilevel degenerative changes. Left pleural effusion.     CT CSpine W/O Contrast    Result Date: 2/14/2024  EXAMINATION: CT OF THE CERVICAL SPINE WITHOUT CONTRAST 2/14/2024 1:37 pm TECHNIQUE: CT of the cervical spine was performed without the administration of intravenous contrast. Multiplanar reformatted images are provided for review. Automated exposure control, iterative reconstruction, and/or weight based adjustment of the mA/kV was utilized to reduce the radiation dose to as low as reasonably achievable. COMPARISON: None. HISTORY: ORDERING SYSTEM PROVIDED HISTORY: fall TECHNOLOGIST PROVIDED HISTORY: Reason for exam:->fall Decision Support Exception - unselect if not a suspected or confirmed emergency medical condition->Emergency Medical Condition (MA) What reading provider will be dictating this exam?->CRC FINDINGS: BONES/ALIGNMENT: There is no acute fracture or traumatic malalignment. DEGENERATIVE CHANGES: C6-C7 degenerative changes. SOFT TISSUES: There is no prevertebral soft tissue swelling.     No acute abnormality of the cervical spine.     CT Head W/O Contrast    Result Date: 2/14/2024  EXAMINATION: CT OF THE HEAD WITHOUT CONTRAST  2/14/2024 1:37 pm TECHNIQUE: CT of the head was performed without the administration of intravenous contrast. Automated exposure control, iterative reconstruction, and/or weight based adjustment of the mA/kV was utilized to reduce the radiation dose to as low as reasonably achievable. COMPARISON: None. HISTORY: ORDERING SYSTEM PROVIDED HISTORY: fall TECHNOLOGIST PROVIDED HISTORY: Has a \"code stroke\" or \"stroke alert\" been called?->No Reason for exam:->fall Decision Support Exception - unselect

## 2024-02-27 NOTE — CARE COORDINATION
2/27 Care Coordination: Plan for discharge today to [facility]. PAS will pick pt up at 1830. Ambulmarques. RN notified. Ghazala notified. Zoll will be her at 1700 to fit Life Vest. CM/SW will continue to follow for discharge planning.   Jairo YANEZN,RN--BC  [phone]

## 2024-02-28 LAB
ALBUMIN SERPL-MCNC: 3.3 G/DL (ref 3.5–5.2)
ALP SERPL-CCNC: 198 U/L (ref 40–129)
ALT SERPL-CCNC: 26 U/L (ref 0–40)
ANION GAP SERPL CALCULATED.3IONS-SCNC: 11 MMOL/L (ref 7–16)
AST SERPL-CCNC: 149 U/L (ref 0–39)
BILIRUB SERPL-MCNC: 5.7 MG/DL (ref 0–1.2)
BUN SERPL-MCNC: 9 MG/DL (ref 6–23)
CALCIUM SERPL-MCNC: 8.3 MG/DL (ref 8.6–10.2)
CHLORIDE SERPL-SCNC: 105 MMOL/L (ref 98–107)
CO2 SERPL-SCNC: 22 MMOL/L (ref 22–29)
CREAT SERPL-MCNC: 0.6 MG/DL (ref 0.7–1.2)
EKG ATRIAL RATE: 62 BPM
EKG P AXIS: 37 DEGREES
EKG P-R INTERVAL: 152 MS
EKG Q-T INTERVAL: 488 MS
EKG QRS DURATION: 84 MS
EKG QTC CALCULATION (BAZETT): 495 MS
EKG R AXIS: -2 DEGREES
EKG T AXIS: 24 DEGREES
EKG VENTRICULAR RATE: 62 BPM
ERYTHROCYTE [DISTWIDTH] IN BLOOD BY AUTOMATED COUNT: 17.5 % (ref 11.5–15)
GFR SERPL CREATININE-BSD FRML MDRD: >60 ML/MIN/1.73M2
GLUCOSE SERPL-MCNC: 88 MG/DL (ref 74–99)
HCT VFR BLD AUTO: 29.6 % (ref 37–54)
HGB BLD-MCNC: 9.8 G/DL (ref 12.5–16.5)
MAGNESIUM SERPL-MCNC: 1.9 MG/DL (ref 1.6–2.6)
MCH RBC QN AUTO: 33.9 PG (ref 26–35)
MCHC RBC AUTO-ENTMCNC: 33.1 G/DL (ref 32–34.5)
MCV RBC AUTO: 102.4 FL (ref 80–99.9)
PHOSPHATE SERPL-MCNC: 2.3 MG/DL (ref 2.5–4.5)
PLATELET CONFIRMATION: NORMAL
PLATELET, FLUORESCENCE: 78 K/UL (ref 130–450)
PMV BLD AUTO: 10.7 FL (ref 7–12)
POTASSIUM SERPL-SCNC: 4 MMOL/L (ref 3.5–5)
PROT SERPL-MCNC: 5.8 G/DL (ref 6.4–8.3)
RBC # BLD AUTO: 2.89 M/UL (ref 3.8–5.8)
SODIUM SERPL-SCNC: 138 MMOL/L (ref 132–146)
WBC OTHER # BLD: 7.4 K/UL (ref 4.5–11.5)

## 2024-02-28 PROCEDURE — 83735 ASSAY OF MAGNESIUM: CPT

## 2024-02-28 PROCEDURE — 6370000000 HC RX 637 (ALT 250 FOR IP): Performed by: STUDENT IN AN ORGANIZED HEALTH CARE EDUCATION/TRAINING PROGRAM

## 2024-02-28 PROCEDURE — 80053 COMPREHEN METABOLIC PANEL: CPT

## 2024-02-28 PROCEDURE — 93005 ELECTROCARDIOGRAM TRACING: CPT | Performed by: INTERNAL MEDICINE

## 2024-02-28 PROCEDURE — 36415 COLL VENOUS BLD VENIPUNCTURE: CPT

## 2024-02-28 PROCEDURE — 6360000002 HC RX W HCPCS: Performed by: NURSE PRACTITIONER

## 2024-02-28 PROCEDURE — 6370000000 HC RX 637 (ALT 250 FOR IP): Performed by: INTERNAL MEDICINE

## 2024-02-28 PROCEDURE — 2140000000 HC CCU INTERMEDIATE R&B

## 2024-02-28 PROCEDURE — 97535 SELF CARE MNGMENT TRAINING: CPT

## 2024-02-28 PROCEDURE — 85027 COMPLETE CBC AUTOMATED: CPT

## 2024-02-28 PROCEDURE — 84100 ASSAY OF PHOSPHORUS: CPT

## 2024-02-28 PROCEDURE — 99232 SBSQ HOSP IP/OBS MODERATE 35: CPT | Performed by: INTERNAL MEDICINE

## 2024-02-28 PROCEDURE — P9047 ALBUMIN (HUMAN), 25%, 50ML: HCPCS | Performed by: NURSE PRACTITIONER

## 2024-02-28 PROCEDURE — 97530 THERAPEUTIC ACTIVITIES: CPT

## 2024-02-28 PROCEDURE — 2580000003 HC RX 258: Performed by: INTERNAL MEDICINE

## 2024-02-28 PROCEDURE — 99233 SBSQ HOSP IP/OBS HIGH 50: CPT | Performed by: INTERNAL MEDICINE

## 2024-02-28 RX ORDER — FUROSEMIDE 40 MG/1
40 TABLET ORAL 2 TIMES DAILY
Qty: 60 TABLET | Refills: 3 | DISCHARGE
Start: 2024-02-28

## 2024-02-28 RX ORDER — FUROSEMIDE 40 MG/1
40 TABLET ORAL 2 TIMES DAILY
Status: DISCONTINUED | OUTPATIENT
Start: 2024-02-28 | End: 2024-02-29 | Stop reason: HOSPADM

## 2024-02-28 RX ADMIN — SACUBITRIL AND VALSARTAN 1 TABLET: 24; 26 TABLET, FILM COATED ORAL at 20:42

## 2024-02-28 RX ADMIN — ROSUVASTATIN CALCIUM 40 MG: 20 TABLET, FILM COATED ORAL at 20:43

## 2024-02-28 RX ADMIN — SACUBITRIL AND VALSARTAN 1 TABLET: 24; 26 TABLET, FILM COATED ORAL at 09:05

## 2024-02-28 RX ADMIN — METOPROLOL SUCCINATE 25 MG: 25 TABLET, EXTENDED RELEASE ORAL at 09:05

## 2024-02-28 RX ADMIN — OXYCODONE HYDROCHLORIDE AND ACETAMINOPHEN 1 TABLET: 5; 325 TABLET ORAL at 00:37

## 2024-02-28 RX ADMIN — LEVETIRACETAM 1500 MG: 500 TABLET, FILM COATED ORAL at 20:43

## 2024-02-28 RX ADMIN — Medication 10 ML: at 20:43

## 2024-02-28 RX ADMIN — Medication 6000 UNITS: at 09:05

## 2024-02-28 RX ADMIN — DIBASIC SODIUM PHOSPHATE, MONOBASIC POTASSIUM PHOSPHATE AND MONOBASIC SODIUM PHOSPHATE 1 TABLET: 852; 155; 130 TABLET ORAL at 20:43

## 2024-02-28 RX ADMIN — CLOPIDOGREL BISULFATE 75 MG: 75 TABLET ORAL at 09:05

## 2024-02-28 RX ADMIN — ALBUMIN (HUMAN) 25 G: 0.25 INJECTION, SOLUTION INTRAVENOUS at 12:21

## 2024-02-28 RX ADMIN — ASPIRIN 81 MG: 81 TABLET, COATED ORAL at 09:05

## 2024-02-28 RX ADMIN — DIBASIC SODIUM PHOSPHATE, MONOBASIC POTASSIUM PHOSPHATE AND MONOBASIC SODIUM PHOSPHATE 1 TABLET: 852; 155; 130 TABLET ORAL at 09:06

## 2024-02-28 RX ADMIN — FUROSEMIDE 40 MG: 40 TABLET ORAL at 09:05

## 2024-02-28 RX ADMIN — ALBUMIN (HUMAN) 25 G: 0.25 INJECTION, SOLUTION INTRAVENOUS at 20:42

## 2024-02-28 RX ADMIN — OXYCODONE HYDROCHLORIDE AND ACETAMINOPHEN 1 TABLET: 5; 325 TABLET ORAL at 12:26

## 2024-02-28 RX ADMIN — PANTOPRAZOLE SODIUM 40 MG: 40 TABLET, DELAYED RELEASE ORAL at 06:31

## 2024-02-28 RX ADMIN — LEVETIRACETAM 1500 MG: 500 TABLET, FILM COATED ORAL at 09:04

## 2024-02-28 RX ADMIN — ALBUMIN (HUMAN) 25 G: 0.25 INJECTION, SOLUTION INTRAVENOUS at 04:39

## 2024-02-28 RX ADMIN — FUROSEMIDE 40 MG: 40 TABLET ORAL at 17:15

## 2024-02-28 RX ADMIN — Medication 10 ML: at 09:04

## 2024-02-28 ASSESSMENT — PAIN - FUNCTIONAL ASSESSMENT
PAIN_FUNCTIONAL_ASSESSMENT: ACTIVITIES ARE NOT PREVENTED
PAIN_FUNCTIONAL_ASSESSMENT: PREVENTS OR INTERFERES SOME ACTIVE ACTIVITIES AND ADLS

## 2024-02-28 ASSESSMENT — PAIN SCALES - GENERAL
PAINLEVEL_OUTOF10: 2
PAINLEVEL_OUTOF10: 3
PAINLEVEL_OUTOF10: 0
PAINLEVEL_OUTOF10: 7
PAINLEVEL_OUTOF10: 7
PAINLEVEL_OUTOF10: 0

## 2024-02-28 ASSESSMENT — PAIN DESCRIPTION - ONSET: ONSET: GRADUAL

## 2024-02-28 ASSESSMENT — PAIN DESCRIPTION - ORIENTATION
ORIENTATION: MID
ORIENTATION: RIGHT;LEFT

## 2024-02-28 ASSESSMENT — PAIN DESCRIPTION - LOCATION
LOCATION: NECK
LOCATION: SHOULDER

## 2024-02-28 ASSESSMENT — PAIN DESCRIPTION - DIRECTION: RADIATING_TOWARDS: NONRADIATING

## 2024-02-28 ASSESSMENT — PAIN DESCRIPTION - DESCRIPTORS
DESCRIPTORS: THROBBING
DESCRIPTORS: ACHING;DISCOMFORT;DULL

## 2024-02-28 ASSESSMENT — PAIN DESCRIPTION - FREQUENCY: FREQUENCY: CONTINUOUS

## 2024-02-28 ASSESSMENT — PAIN DESCRIPTION - PAIN TYPE: TYPE: ACUTE PAIN

## 2024-02-28 NOTE — CARE COORDINATION
2/28/24, DOV spoke with Ghazala from [facility] patient is not accepted at facility at this time.  Discussed [facility] and patient is not able to go to [facility] at this time.  DOV spoke with patient at bedside and discussed needing other choices for rehab.  Choice4-[facility] in [place], Choice 5-[facility].  Referral made to Elena from [facility] in [place].  Elena to let this worker know about referral.  DOV to follow.      Natalee Frausto, SAMARIA  [facility] Case Management  [phone]

## 2024-02-28 NOTE — CARE COORDINATION
2/28/24, DOV spoke with Ghazala from [facility].  Ghazala to revisit patient today after 1pm to see if patient is appropriate stillfor their facility. Precert is still good with [facility] through tomorrow.  Informed Ghazala that this worker spoke with patient today in room and showed no confusion and was wanting to go to [facility].  Patient has for choices 1-[facility] , 2-[facility], 3-[facility].  Followed up with Mary Beth from [facility] and no beds at this time.  Will wait to see if Ghazala can take this patient to [facility].      Natalee Frausto, [facility] Case Management  [phone]

## 2024-02-29 VITALS
HEART RATE: 69 BPM | HEIGHT: 71 IN | OXYGEN SATURATION: 94 % | DIASTOLIC BLOOD PRESSURE: 67 MMHG | TEMPERATURE: 97.8 F | WEIGHT: 181.4 LBS | RESPIRATION RATE: 16 BRPM | SYSTOLIC BLOOD PRESSURE: 127 MMHG | BODY MASS INDEX: 25.4 KG/M2

## 2024-02-29 LAB
ALBUMIN SERPL-MCNC: 4.2 G/DL (ref 3.5–5.2)
ALP SERPL-CCNC: 205 U/L (ref 40–129)
ALT SERPL-CCNC: 31 U/L (ref 0–40)
ANION GAP SERPL CALCULATED.3IONS-SCNC: 13 MMOL/L (ref 7–16)
AST SERPL-CCNC: 167 U/L (ref 0–39)
BILIRUB SERPL-MCNC: 6.8 MG/DL (ref 0–1.2)
BUN SERPL-MCNC: 8 MG/DL (ref 6–23)
CALCIUM SERPL-MCNC: 8.7 MG/DL (ref 8.6–10.2)
CHLORIDE SERPL-SCNC: 103 MMOL/L (ref 98–107)
CO2 SERPL-SCNC: 23 MMOL/L (ref 22–29)
CREAT SERPL-MCNC: 0.6 MG/DL (ref 0.7–1.2)
EKG ATRIAL RATE: 70 BPM
EKG P AXIS: 36 DEGREES
EKG P-R INTERVAL: 152 MS
EKG Q-T INTERVAL: 480 MS
EKG QRS DURATION: 84 MS
EKG QTC CALCULATION (BAZETT): 518 MS
EKG R AXIS: -10 DEGREES
EKG T AXIS: -3 DEGREES
EKG VENTRICULAR RATE: 70 BPM
ERYTHROCYTE [DISTWIDTH] IN BLOOD BY AUTOMATED COUNT: 17.8 % (ref 11.5–15)
GFR SERPL CREATININE-BSD FRML MDRD: >60 ML/MIN/1.73M2
GLUCOSE SERPL-MCNC: 92 MG/DL (ref 74–99)
HCT VFR BLD AUTO: 31 % (ref 37–54)
HGB BLD-MCNC: 10.1 G/DL (ref 12.5–16.5)
MAGNESIUM SERPL-MCNC: 1.7 MG/DL (ref 1.6–2.6)
MCH RBC QN AUTO: 33.2 PG (ref 26–35)
MCHC RBC AUTO-ENTMCNC: 32.6 G/DL (ref 32–34.5)
MCV RBC AUTO: 102 FL (ref 80–99.9)
PHOSPHATE SERPL-MCNC: 2.3 MG/DL (ref 2.5–4.5)
PLATELET CONFIRMATION: NORMAL
PLATELET, FLUORESCENCE: 87 K/UL (ref 130–450)
PMV BLD AUTO: 11.3 FL (ref 7–12)
POTASSIUM SERPL-SCNC: 3.4 MMOL/L (ref 3.5–5)
PROT SERPL-MCNC: 6.5 G/DL (ref 6.4–8.3)
RBC # BLD AUTO: 3.04 M/UL (ref 3.8–5.8)
SODIUM SERPL-SCNC: 139 MMOL/L (ref 132–146)
WBC OTHER # BLD: 7.7 K/UL (ref 4.5–11.5)

## 2024-02-29 PROCEDURE — 6370000000 HC RX 637 (ALT 250 FOR IP): Performed by: INTERNAL MEDICINE

## 2024-02-29 PROCEDURE — 36415 COLL VENOUS BLD VENIPUNCTURE: CPT

## 2024-02-29 PROCEDURE — 6370000000 HC RX 637 (ALT 250 FOR IP): Performed by: STUDENT IN AN ORGANIZED HEALTH CARE EDUCATION/TRAINING PROGRAM

## 2024-02-29 PROCEDURE — 99239 HOSP IP/OBS DSCHRG MGMT >30: CPT | Performed by: INTERNAL MEDICINE

## 2024-02-29 PROCEDURE — P9047 ALBUMIN (HUMAN), 25%, 50ML: HCPCS | Performed by: NURSE PRACTITIONER

## 2024-02-29 PROCEDURE — 93005 ELECTROCARDIOGRAM TRACING: CPT | Performed by: INTERNAL MEDICINE

## 2024-02-29 PROCEDURE — 85027 COMPLETE CBC AUTOMATED: CPT

## 2024-02-29 PROCEDURE — 80053 COMPREHEN METABOLIC PANEL: CPT

## 2024-02-29 PROCEDURE — 99233 SBSQ HOSP IP/OBS HIGH 50: CPT | Performed by: INTERNAL MEDICINE

## 2024-02-29 PROCEDURE — 2580000003 HC RX 258: Performed by: INTERNAL MEDICINE

## 2024-02-29 PROCEDURE — 6360000002 HC RX W HCPCS: Performed by: NURSE PRACTITIONER

## 2024-02-29 PROCEDURE — 6360000002 HC RX W HCPCS: Performed by: INTERNAL MEDICINE

## 2024-02-29 PROCEDURE — 2500000003 HC RX 250 WO HCPCS: Performed by: INTERNAL MEDICINE

## 2024-02-29 PROCEDURE — 83735 ASSAY OF MAGNESIUM: CPT

## 2024-02-29 PROCEDURE — 84100 ASSAY OF PHOSPHORUS: CPT

## 2024-02-29 RX ORDER — METOPROLOL SUCCINATE 25 MG/1
25 TABLET, EXTENDED RELEASE ORAL DAILY
Qty: 90 TABLET | Refills: 1 | DISCHARGE
Start: 2024-02-29

## 2024-02-29 RX ORDER — MAGNESIUM SULFATE IN WATER 40 MG/ML
2000 INJECTION, SOLUTION INTRAVENOUS ONCE
Status: COMPLETED | OUTPATIENT
Start: 2024-02-29 | End: 2024-02-29

## 2024-02-29 RX ORDER — CARVEDILOL 12.5 MG/1
12.5 TABLET ORAL 2 TIMES DAILY WITH MEALS
Qty: 60 TABLET | Refills: 3 | DISCHARGE
Start: 2024-02-29 | End: 2024-02-29 | Stop reason: HOSPADM

## 2024-02-29 RX ORDER — METOPROLOL SUCCINATE 25 MG/1
25 TABLET, EXTENDED RELEASE ORAL DAILY
Status: DISCONTINUED | OUTPATIENT
Start: 2024-03-01 | End: 2024-02-29 | Stop reason: HOSPADM

## 2024-02-29 RX ORDER — CARVEDILOL 6.25 MG/1
12.5 TABLET ORAL 2 TIMES DAILY WITH MEALS
Status: DISCONTINUED | OUTPATIENT
Start: 2024-02-29 | End: 2024-02-29

## 2024-02-29 RX ADMIN — PANTOPRAZOLE SODIUM 40 MG: 40 TABLET, DELAYED RELEASE ORAL at 05:46

## 2024-02-29 RX ADMIN — ASPIRIN 81 MG: 81 TABLET, COATED ORAL at 08:55

## 2024-02-29 RX ADMIN — POTASSIUM PHOSPHATE, MONOBASIC AND POTASSIUM PHOSPHATE, DIBASIC 20 MMOL: 224; 236 INJECTION, SOLUTION, CONCENTRATE INTRAVENOUS at 12:21

## 2024-02-29 RX ADMIN — FUROSEMIDE 40 MG: 40 TABLET ORAL at 08:54

## 2024-02-29 RX ADMIN — LEVETIRACETAM 1500 MG: 500 TABLET, FILM COATED ORAL at 08:54

## 2024-02-29 RX ADMIN — Medication 10 ML: at 08:54

## 2024-02-29 RX ADMIN — ALBUMIN (HUMAN) 25 G: 0.25 INJECTION, SOLUTION INTRAVENOUS at 05:41

## 2024-02-29 RX ADMIN — CARVEDILOL 12.5 MG: 6.25 TABLET, FILM COATED ORAL at 08:54

## 2024-02-29 RX ADMIN — DIBASIC SODIUM PHOSPHATE, MONOBASIC POTASSIUM PHOSPHATE AND MONOBASIC SODIUM PHOSPHATE 1 TABLET: 852; 155; 130 TABLET ORAL at 08:55

## 2024-02-29 RX ADMIN — SACUBITRIL AND VALSARTAN 1 TABLET: 24; 26 TABLET, FILM COATED ORAL at 08:55

## 2024-02-29 RX ADMIN — CLOPIDOGREL BISULFATE 75 MG: 75 TABLET ORAL at 08:55

## 2024-02-29 RX ADMIN — MAGNESIUM SULFATE HEPTAHYDRATE 2000 MG: 40 INJECTION, SOLUTION INTRAVENOUS at 09:11

## 2024-02-29 RX ADMIN — Medication 6000 UNITS: at 08:57

## 2024-02-29 RX ADMIN — OXYCODONE HYDROCHLORIDE AND ACETAMINOPHEN 1 TABLET: 5; 325 TABLET ORAL at 08:55

## 2024-02-29 ASSESSMENT — PAIN - FUNCTIONAL ASSESSMENT: PAIN_FUNCTIONAL_ASSESSMENT: PREVENTS OR INTERFERES SOME ACTIVE ACTIVITIES AND ADLS

## 2024-02-29 ASSESSMENT — PAIN DESCRIPTION - FREQUENCY: FREQUENCY: INTERMITTENT

## 2024-02-29 ASSESSMENT — PAIN DESCRIPTION - PAIN TYPE: TYPE: CHRONIC PAIN

## 2024-02-29 ASSESSMENT — PAIN SCALES - GENERAL: PAINLEVEL_OUTOF10: 7

## 2024-02-29 ASSESSMENT — PAIN DESCRIPTION - LOCATION: LOCATION: SHOULDER

## 2024-02-29 ASSESSMENT — PAIN DESCRIPTION - DESCRIPTORS: DESCRIPTORS: ACHING

## 2024-02-29 ASSESSMENT — PAIN DESCRIPTION - ONSET: ONSET: ON-GOING

## 2024-02-29 NOTE — PLAN OF CARE
Problem: Discharge Planning  Goal: Discharge to home or other facility with appropriate resources  Outcome: Not Progressing     Problem: Skin/Tissue Integrity  Goal: Absence of new skin breakdown  Description: 1.  Monitor for areas of redness and/or skin breakdown  2.  Assess vascular access sites hourly  3.  Every 4-6 hours minimum:  Change oxygen saturation probe site  4.  Every 4-6 hours:  If on nasal continuous positive airway pressure, respiratory therapy assess nares and determine need for appliance change or resting period.  Outcome: Progressing     Problem: Safety - Adult  Goal: Free from fall injury  Outcome: Progressing     Problem: ABCDS Injury Assessment  Goal: Absence of physical injury  Outcome: Progressing     Problem: Pain  Goal: Verbalizes/displays adequate comfort level or baseline comfort level  Outcome: Progressing     Problem: Safety - Medical Restraint  Goal: Remains free of injury from restraints (Restraint for Interference with Medical Device)  Description: INTERVENTIONS:  1. Determine that other, less restrictive measures have been tried or would not be effective before applying the restraint  2. Evaluate the patient's condition at the time of restraint application  3. Inform patient/family regarding the reason for restraint  4. Q2H: Monitor safety, psychosocial status, comfort, nutrition and hydration  Outcome: Progressing  Flowsheets (Taken 2/19/2024 2000 by Bonita Erickson, RN)  Remains free of injury from restraints (restraint for interference with medical device): Determine that other, less restrictive measures have been tried or would not be effective before applying the restraint     Problem: Discharge Planning  Goal: Discharge to home or other facility with appropriate resources  Outcome: Not Progressing     
  Problem: Discharge Planning  Goal: Discharge to home or other facility with appropriate resources  Recent Flowsheet Documentation  Taken 2/17/2024 0800 by NEY Monroe, RN  Discharge to home or other facility with appropriate resources: Identify barriers to discharge with patient and caregiver  2/17/2024 0757 by NEY Monroe, RN  Outcome: Progressing  2/17/2024 0334 by Martin Mccarthy, RN  Outcome: Not Progressing     
  Problem: Safety - Medical Restraint  Goal: Remains free of injury from restraints (Restraint for Interference with Medical Device)  Description: INTERVENTIONS:  1. Determine that other, less restrictive measures have been tried or would not be effective before applying the restraint  2. Evaluate the patient's condition at the time of restraint application  3. Inform patient/family regarding the reason for restraint  4. Q2H: Monitor safety, psychosocial status, comfort, nutrition and hydration  Outcome: Progressing     
  Problem: Skin/Tissue Integrity  Goal: Absence of new skin breakdown  Description: 1.  Monitor for areas of redness and/or skin breakdown  2.  Assess vascular access sites hourly  3.  Every 4-6 hours minimum:  Change oxygen saturation probe site  4.  Every 4-6 hours:  If on nasal continuous positive airway pressure, respiratory therapy assess nares and determine need for appliance change or resting period.  2/15/2024 1242 by Sharon Lambert RN  Outcome: Progressing  2/15/2024 0240 by Cherelle English RN  Outcome: Progressing     Problem: Safety - Adult  Goal: Free from fall injury  2/15/2024 1242 by Sharon Lambert RN  Outcome: Progressing  2/15/2024 0240 by Cherelle English RN  Outcome: Progressing     Problem: ABCDS Injury Assessment  Goal: Absence of physical injury  2/15/2024 1242 by Sharon Lambert RN  Outcome: Progressing  2/15/2024 0240 by Cherelle English RN  Outcome: Progressing     
  Problem: Skin/Tissue Integrity  Goal: Absence of new skin breakdown  Description: 1.  Monitor for areas of redness and/or skin breakdown  2.  Assess vascular access sites hourly  3.  Every 4-6 hours minimum:  Change oxygen saturation probe site  4.  Every 4-6 hours:  If on nasal continuous positive airway pressure, respiratory therapy assess nares and determine need for appliance change or resting period.  2/16/2024 0931 by NEY Monroe RN  Outcome: Progressing  2/16/2024 0848 by NEY Monroe RN  Outcome: Progressing     Problem: Safety - Adult  Goal: Free from fall injury  2/16/2024 0931 by NEY Monroe RN  Outcome: Progressing  2/16/2024 0848 by NEY Monroe RN  Outcome: Progressing     Problem: ABCDS Injury Assessment  Goal: Absence of physical injury  2/16/2024 0931 by NEY Monroe RN  Outcome: Progressing  2/16/2024 0848 by NEY Monroe RN  Outcome: Progressing     Problem: Discharge Planning  Goal: Discharge to home or other facility with appropriate resources  2/16/2024 0931 by NEY Monroe RN  Outcome: Progressing  2/16/2024 0848 by NEY Monroe RN  Outcome: Progressing     Problem: Pain  Goal: Verbalizes/displays adequate comfort level or baseline comfort level  Outcome: Progressing  Flowsheets (Taken 2/16/2024 0900)  Verbalizes/displays adequate comfort level or baseline comfort level:   Encourage patient to monitor pain and request assistance   Assess pain using appropriate pain scale     
  Problem: Skin/Tissue Integrity  Goal: Absence of new skin breakdown  Description: 1.  Monitor for areas of redness and/or skin breakdown  2.  Assess vascular access sites hourly  3.  Every 4-6 hours minimum:  Change oxygen saturation probe site  4.  Every 4-6 hours:  If on nasal continuous positive airway pressure, respiratory therapy assess nares and determine need for appliance change or resting period.  2/17/2024 0757 by NEY Monroe RN  Outcome: Progressing  2/17/2024 0334 by Martin Mccarthy RN  Outcome: Progressing     Problem: Safety - Adult  Goal: Free from fall injury  2/17/2024 0757 by NEY Monroe RN  Outcome: Progressing  2/17/2024 0334 by Martin Mccarthy RN  Outcome: Progressing     Problem: ABCDS Injury Assessment  Goal: Absence of physical injury  2/17/2024 0757 by NEY Monroe RN  Outcome: Progressing  2/17/2024 0334 by Martin Mccarthy RN  Outcome: Progressing     Problem: Discharge Planning  Goal: Discharge to home or other facility with appropriate resources  2/17/2024 0757 by NEY Monroe RN  Outcome: Progressing  2/17/2024 0334 by Martin Mccarthy RN  Outcome: Not Progressing     Problem: Pain  Goal: Verbalizes/displays adequate comfort level or baseline comfort level  2/17/2024 0757 by NEY Monroe RN  Outcome: Progressing  2/17/2024 0334 by Martin Mccarthy RN  Outcome: Progressing     Problem: Discharge Planning  Goal: Discharge to home or other facility with appropriate resources  2/17/2024 0757 by NEY Monroe RN  Outcome: Progressing  2/17/2024 0334 by Martin Mccarthy RN  Outcome: Not Progressing     
  Problem: Skin/Tissue Integrity  Goal: Absence of new skin breakdown  Description: 1.  Monitor for areas of redness and/or skin breakdown  2.  Assess vascular access sites hourly  3.  Every 4-6 hours minimum:  Change oxygen saturation probe site  4.  Every 4-6 hours:  If on nasal continuous positive airway pressure, respiratory therapy assess nares and determine need for appliance change or resting period.  2/18/2024 0714 by NEY Monroe RN  Outcome: Progressing  2/17/2024 2212 by Sloan Palencia RN  Outcome: Progressing     Problem: Safety - Adult  Goal: Free from fall injury  2/18/2024 0714 by NEY Monroe RN  Outcome: Progressing  2/17/2024 2212 by Sloan Palencia RN  Outcome: Progressing     Problem: ABCDS Injury Assessment  Goal: Absence of physical injury  2/18/2024 0714 by NEY Monroe RN  Outcome: Progressing  2/17/2024 2212 by Sloan Palencia RN  Outcome: Progressing     Problem: Discharge Planning  Goal: Discharge to home or other facility with appropriate resources  2/18/2024 0714 by NEY Monroe RN  Outcome: Progressing  2/17/2024 2212 by Sloan Palencia RN  Outcome: Progressing  Flowsheets (Taken 2/17/2024 2000)  Discharge to home or other facility with appropriate resources: Identify barriers to discharge with patient and caregiver     Problem: Pain  Goal: Verbalizes/displays adequate comfort level or baseline comfort level  2/18/2024 0714 by NEY Monroe RN  Outcome: Progressing  2/17/2024 2212 by Sloan Palencia RN  Outcome: Progressing  Flowsheets (Taken 2/17/2024 1900)  Verbalizes/displays adequate comfort level or baseline comfort level: Assess pain using appropriate pain scale     Problem: Safety - Medical Restraint  Goal: Remains free of injury from restraints (Restraint for Interference with Medical Device)  Description: INTERVENTIONS:  1. Determine that other, less restrictive measures have been tried or would not be effective before applying the 
  Problem: Skin/Tissue Integrity  Goal: Absence of new skin breakdown  Description: 1.  Monitor for areas of redness and/or skin breakdown  2.  Assess vascular access sites hourly  3.  Every 4-6 hours minimum:  Change oxygen saturation probe site  4.  Every 4-6 hours:  If on nasal continuous positive airway pressure, respiratory therapy assess nares and determine need for appliance change or resting period.  2/20/2024 1856 by Wei De La Torre RN  Outcome: Progressing  2/20/2024 0706 by Jazzy Acevedo RN  Outcome: Progressing     Problem: Safety - Adult  Goal: Free from fall injury  2/20/2024 1856 by Wei De La Torre RN  Outcome: Progressing  2/20/2024 0706 by Jazzy Acevedo RN  Outcome: Progressing  Flowsheets (Taken 2/20/2024 0706)  Free From Fall Injury: Instruct family/caregiver on patient safety     Problem: ABCDS Injury Assessment  Goal: Absence of physical injury  2/20/2024 1856 by Wei De La Torre RN  Outcome: Progressing  2/20/2024 0706 by Jazzy Acevedo RN  Outcome: Progressing  Flowsheets (Taken 2/20/2024 0706)  Absence of Physical Injury: Implement safety measures based on patient assessment     Problem: Discharge Planning  Goal: Discharge to home or other facility with appropriate resources  2/20/2024 1856 by Wei De La Torre RN  Outcome: Progressing  2/20/2024 0706 by Jazzy Acevedo RN  Outcome: Not Progressing     Problem: Pain  Goal: Verbalizes/displays adequate comfort level or baseline comfort level  2/20/2024 1856 by Wei De La Torre RN  Outcome: Progressing  2/20/2024 0706 by Jazzy Acevedo RN  Outcome: Progressing     Problem: Discharge Planning  Goal: Discharge to home or other facility with appropriate resources  2/20/2024 1856 by Wei De La Torre RN  Outcome: Progressing  2/20/2024 0706 by Jazzy Acevedo RN  Outcome: Not Progressing     
  Problem: Skin/Tissue Integrity  Goal: Absence of new skin breakdown  Description: 1.  Monitor for areas of redness and/or skin breakdown  2.  Assess vascular access sites hourly  3.  Every 4-6 hours minimum:  Change oxygen saturation probe site  4.  Every 4-6 hours:  If on nasal continuous positive airway pressure, respiratory therapy assess nares and determine need for appliance change or resting period.  Outcome: Adequate for Discharge     Problem: Safety - Adult  Goal: Free from fall injury  Outcome: Adequate for Discharge     Problem: ABCDS Injury Assessment  Goal: Absence of physical injury  Outcome: Adequate for Discharge     Problem: Discharge Planning  Goal: Discharge to home or other facility with appropriate resources  Outcome: Adequate for Discharge     Problem: Pain  Goal: Verbalizes/displays adequate comfort level or baseline comfort level  Outcome: Adequate for Discharge     Problem: Nutrition Deficit:  Goal: Optimize nutritional status  Outcome: Adequate for Discharge     Problem: Confusion  Goal: Confusion, delirium, dementia, or psychosis is improved or at baseline  Description: INTERVENTIONS:  1. Assess for possible contributors to thought disturbance, including medications, impaired vision or hearing, underlying metabolic abnormalities, dehydration, psychiatric diagnoses, and notify attending LIP  2. [place] high risk fall precautions, as indicated  3. Provide frequent short contacts to provide reality reorientation, refocusing and direction  4. Decrease environmental stimuli, including noise as appropriate  5. Monitor and intervene to maintain adequate nutrition, hydration, elimination, sleep and activity  6. If unable to ensure safety without constant attention obtain sitter and review sitter guidelines with assigned personnel  7. Initiate Psychosocial CNS and Spiritual Care consult, as indicated  Outcome: Adequate for Discharge     
  Problem: Skin/Tissue Integrity  Goal: Absence of new skin breakdown  Description: 1.  Monitor for areas of redness and/or skin breakdown  2.  Assess vascular access sites hourly  3.  Every 4-6 hours minimum:  Change oxygen saturation probe site  4.  Every 4-6 hours:  If on nasal continuous positive airway pressure, respiratory therapy assess nares and determine need for appliance change or resting period.  Outcome: Progressing     Problem: Safety - Adult  Goal: Free from fall injury  Outcome: Progressing     
  Problem: Skin/Tissue Integrity  Goal: Absence of new skin breakdown  Description: 1.  Monitor for areas of redness and/or skin breakdown  2.  Assess vascular access sites hourly  3.  Every 4-6 hours minimum:  Change oxygen saturation probe site  4.  Every 4-6 hours:  If on nasal continuous positive airway pressure, respiratory therapy assess nares and determine need for appliance change or resting period.  Outcome: Progressing     Problem: Safety - Adult  Goal: Free from fall injury  Outcome: Progressing     Problem: ABCDS Injury Assessment  Goal: Absence of physical injury  Outcome: Progressing     Problem: Discharge Planning  Goal: Discharge to home or other facility with appropriate resources  Outcome: Progressing     
  Problem: Skin/Tissue Integrity  Goal: Absence of new skin breakdown  Description: 1.  Monitor for areas of redness and/or skin breakdown  2.  Assess vascular access sites hourly  3.  Every 4-6 hours minimum:  Change oxygen saturation probe site  4.  Every 4-6 hours:  If on nasal continuous positive airway pressure, respiratory therapy assess nares and determine need for appliance change or resting period.  Outcome: Progressing     Problem: Safety - Adult  Goal: Free from fall injury  Outcome: Progressing     Problem: ABCDS Injury Assessment  Goal: Absence of physical injury  Outcome: Progressing     Problem: Discharge Planning  Goal: Discharge to home or other facility with appropriate resources  Outcome: Progressing     Problem: Pain  Goal: Verbalizes/displays adequate comfort level or baseline comfort level  Outcome: Progressing     
  Problem: Skin/Tissue Integrity  Goal: Absence of new skin breakdown  Description: 1.  Monitor for areas of redness and/or skin breakdown  2.  Assess vascular access sites hourly  3.  Every 4-6 hours minimum:  Change oxygen saturation probe site  4.  Every 4-6 hours:  If on nasal continuous positive airway pressure, respiratory therapy assess nares and determine need for appliance change or resting period.  Outcome: Progressing     Problem: Safety - Adult  Goal: Free from fall injury  Outcome: Progressing     Problem: ABCDS Injury Assessment  Goal: Absence of physical injury  Outcome: Progressing     Problem: Discharge Planning  Goal: Discharge to home or other facility with appropriate resources  Outcome: Progressing     Problem: Pain  Goal: Verbalizes/displays adequate comfort level or baseline comfort level  Outcome: Progressing     Problem: Nutrition Deficit:  Goal: Optimize nutritional status  Outcome: Progressing     Problem: Confusion  Goal: Confusion, delirium, dementia, or psychosis is improved or at baseline  Description: INTERVENTIONS:  1. Assess for possible contributors to thought disturbance, including medications, impaired vision or hearing, underlying metabolic abnormalities, dehydration, psychiatric diagnoses, and notify attending LIP  2. [place] high risk fall precautions, as indicated  3. Provide frequent short contacts to provide reality reorientation, refocusing and direction  4. Decrease environmental stimuli, including noise as appropriate  5. Monitor and intervene to maintain adequate nutrition, hydration, elimination, sleep and activity  6. If unable to ensure safety without constant attention obtain sitter and review sitter guidelines with assigned personnel  7. Initiate Psychosocial CNS and Spiritual Care consult, as indicated  Outcome: Progressing     
  Problem: Skin/Tissue Integrity  Goal: Absence of new skin breakdown  Description: 1.  Monitor for areas of redness and/or skin breakdown  2.  Assess vascular access sites hourly  3.  Every 4-6 hours minimum:  Change oxygen saturation probe site  4.  Every 4-6 hours:  If on nasal continuous positive airway pressure, respiratory therapy assess nares and determine need for appliance change or resting period.  Outcome: Progressing     Problem: Safety - Adult  Goal: Free from fall injury  Outcome: Progressing     Problem: ABCDS Injury Assessment  Goal: Absence of physical injury  Outcome: Progressing     Problem: Discharge Planning  Goal: Discharge to home or other facility with appropriate resources  Outcome: Progressing     Problem: Pain  Goal: Verbalizes/displays adequate comfort level or baseline comfort level  Outcome: Progressing     Problem: Nutrition Deficit:  Goal: Optimize nutritional status  Outcome: Progressing     Problem: Confusion  Goal: Confusion, delirium, dementia, or psychosis is improved or at baseline  Description: INTERVENTIONS:  1. Assess for possible contributors to thought disturbance, including medications, impaired vision or hearing, underlying metabolic abnormalities, dehydration, psychiatric diagnoses, and notify attending LIP  2. [place] high risk fall precautions, as indicated  3. Provide frequent short contacts to provide reality reorientation, refocusing and direction  4. Decrease environmental stimuli, including noise as appropriate  5. Monitor and intervene to maintain adequate nutrition, hydration, elimination, sleep and activity  6. If unable to ensure safety without constant attention obtain sitter and review sitter guidelines with assigned personnel  7. Initiate Psychosocial CNS and Spiritual Care consult, as indicated  Outcome: Progressing     
  Problem: Skin/Tissue Integrity  Goal: Absence of new skin breakdown  Description: 1.  Monitor for areas of redness and/or skin breakdown  2.  Assess vascular access sites hourly  3.  Every 4-6 hours minimum:  Change oxygen saturation probe site  4.  Every 4-6 hours:  If on nasal continuous positive airway pressure, respiratory therapy assess nares and determine need for appliance change or resting period.  Outcome: Progressing     Problem: Safety - Adult  Goal: Free from fall injury  Outcome: Progressing     Problem: ABCDS Injury Assessment  Goal: Absence of physical injury  Outcome: Progressing     Problem: Discharge Planning  Goal: Discharge to home or other facility with appropriate resources  Outcome: Progressing  Flowsheets (Taken 2/17/2024 2000)  Discharge to home or other facility with appropriate resources: Identify barriers to discharge with patient and caregiver     Problem: Pain  Goal: Verbalizes/displays adequate comfort level or baseline comfort level  Outcome: Progressing  Flowsheets (Taken 2/17/2024 1900)  Verbalizes/displays adequate comfort level or baseline comfort level: Assess pain using appropriate pain scale     Problem: Safety - Medical Restraint  Goal: Remains free of injury from restraints (Restraint for Interference with Medical Device)  Description: INTERVENTIONS:  1. Determine that other, less restrictive measures have been tried or would not be effective before applying the restraint  2. Evaluate the patient's condition at the time of restraint application  3. Inform patient/family regarding the reason for restraint  4. Q2H: Monitor safety, psychosocial status, comfort, nutrition and hydration  2/17/2024 2212 by Sloan Palencia, RN  Outcome: Progressing  Flowsheets  Taken 2/17/2024 2200  Remains free of injury from restraints (restraint for interference with medical device): Determine that other, less restrictive measures have been tried or would not be effective before applying the 
  Problem: Skin/Tissue Integrity  Goal: Absence of new skin breakdown  Description: 1.  Monitor for areas of redness and/or skin breakdown  2.  Assess vascular access sites hourly  3.  Every 4-6 hours minimum:  Change oxygen saturation probe site  4.  Every 4-6 hours:  If on nasal continuous positive airway pressure, respiratory therapy assess nares and determine need for appliance change or resting period.  Outcome: Progressing     Problem: Safety - Adult  Goal: Free from fall injury  Outcome: Progressing     Problem: ABCDS Injury Assessment  Goal: Absence of physical injury  Outcome: Progressing     Problem: Pain  Goal: Verbalizes/displays adequate comfort level or baseline comfort level  Outcome: Progressing     Problem: Discharge Planning  Goal: Discharge to home or other facility with appropriate resources  Outcome: Not Progressing     
Patient's chart updated to reflect:      .    - HF care plan, HF education points and HF discharge instructions.  -Orders: 2 gram sodium diet, daily weights, I/O.  -PCP and cardiology follow up appointments to be scheduled within 7 days of hospital discharge.  -CHF education session will be provided to the patient prior to hospital discharge.    Amada Nicholas RN   Heart Failure Navigator    
other, less restrictive measures have been tried or would not be effective before applying the restraint     
patient/family regarding the reason for restraint   Every 2 hours: Monitor safety, psychosocial status, comfort, nutrition and hydration  2/20/2024 0706 by Jazzy Acevedo, RN  Outcome: Progressing  Flowsheets (Taken 2/19/2024 2000 by Bonita Erickson, RN)  Remains free of injury from restraints (restraint for interference with medical device): Determine that other, less restrictive measures have been tried or would not be effective before applying the restraint     Problem: Discharge Planning  Goal: Discharge to home or other facility with appropriate resources  2/20/2024 1856 by Wei De La Torre, RN  Outcome: Progressing  2/20/2024 0706 by Jazzy Acevedo, RN  Outcome: Not Progressing

## 2024-02-29 NOTE — PROGRESS NOTES
2/16/2024 11:19 AM  Service: Urology  Group: [facility] (Cade/Lucila/Ginny)    Shahzad Rodgers  [id number]    Subjective:    Pt was a code blue this am and transferred to the ICU  He is currently intubated  He is moving intermittently   There is no family present         Review of Systems  Unable to obtain secondary to above      Scheduled Meds:   melatonin  5 mg Oral QPM    potassium chloride  10 mEq IntraVENous Q1H    [Held by provider] furosemide  40 mg Oral BID    nadolol  20 mg Oral Daily    pantoprazole  40 mg Oral BID AC    potassium chloride  20 mEq Oral Daily    sodium chloride flush  5-40 mL IntraVENous 2 times per day       Objective:  Vitals:    02/16/24 1111   BP:    Pulse: 93   Resp: 21   Temp:    SpO2: 100%         Allergies: Penicillins    General Appearance: Appears critically ill   Skin: no rash or erythema  Head: normocephalic and atraumatic  Pulmonary/Chest: normal air movement, no respiratory distress and no chest wall tenderness  Abdomen: soft,ascites,   Genitalia: No penile or scrotal swelling or masses.   Extremities: no cyanosis, clubbing or edema and Soha's sign negative bilaterally      Macdonald well positioned and draining clear barb  urine.    Labs:     Recent Labs     02/16/24  0815      K 2.9*   CL 85*   CO2 24   BUN 17   CREATININE 0.8   GLUCOSE 97   CALCIUM 7.7*       Lab Results   Component Value Date/Time    HGB 11.7 02/16/2024 08:15 AM    HCT 33.9 02/16/2024 08:15 AM       Ref Range & Units 2/15/24 1158 2/15/24 0523 8/25/23 1435   PSA  0.00 - 4.00 ng/mL <0.01 <0.01 CM <0.01 CM      Latest Reference Range & Units 02/14/24 10:28   Color, UA Yellow  Yellow   Turbidity UA Clear  Clear   Glucose, UA NEGATIVE mg/dL NEGATIVE   Bilirubin, Urine NEGATIVE  SMALL !   Ketones, Urine NEGATIVE mg/dL 15 !   Specific Gravity, UA 1.005 - 1.030  1.010   pH, UA 5.0 - 9.0  6.5   Protein, UA NEGATIVE mg/dL NEGATIVE   Urobilinogen, Urine 0.0 - 1.0 EU/dL >8.0 (H)   Nitrite, 
            CRITICAL CARE PROGRESS NOTE      [facility]  Department of Pulmonary, Critical Care and Sleep Medicine  [facility]  Dr. Conway, Dr. Turcios, Dr. Emmanuel    SUBJECTIVE:  Patient seen and examined.  He remains orally intubated.  Did withdraw to noxious stimuli on my exam today with bilateral upper extremities.  Continue to await EEG results.  Continue Keppra.  Wean mechanical ventilation as tolerated    OBJECTIVE:  Vitals:    02/17/24 1300 02/17/24 1400 02/17/24 1500 02/17/24 1600   BP: (!) 99/52 (!) 108/54 (!) 97/52 (!) 92/48   Pulse: 58 56 57 55   Resp: 13 12 12 16   Temp:  98.1 °F (36.7 °C)  98.2 °F (36.8 °C)   TempSrc:       SpO2: 98% 98% 99% 99%   Weight:       Height:           1. General: Sedated.  Ill-appearing  2. Eyes: Vision - grossly normal, PERRLA   3. HENT: Head is atraumatic & normocephalic. Neck Supple, No jugular venous distention   4. Respiratory: Lungs are clear to auscultation, Respirations are non-labored, Breath sounds are equal   5. Cardiovascular: S1, S2 normal, Regular rate & rhythm, No murmur, No pedal edema   6. Gastrointestinal: Soft, Non-tender, Non-distended, Normal bowel sounds. No organomegaly.  7. Neurologic: Awake & Alert, Cranial nerves 2-12 grossly intact, No focal motor or sensory deficits.  8. Skin: no rashes, breakdown  9. Musculoskeletal: no LE edema, no joint effusion.  10. Psychiatric -unable to assess    DATA:    CBC:   Lab Results   Component Value Date    WBC 9.2 02/17/2024    HGB 10.4 (L) 02/17/2024    HCT 30.1 (L) 02/17/2024    MCV 94.4 02/17/2024    PLT 72 (L) 10/06/2023     LFTs:   Lab Results   Component Value Date    ALT 33 02/17/2024     (H) 02/17/2024    ALKPHOS 175 (H) 02/17/2024    BILITOT 9.1 (H) 02/17/2024    PROT 6.4 02/17/2024     Coags:   Lab Results   Component Value Date    INR 1.5 02/16/2024       RADIOLOGY:      XR CHEST PORTABLE    Result Date: 2/17/2024  EXAMINATION: ONE 
            CRITICAL CARE PROGRESS NOTE      [facility]  Department of Pulmonary, Critical Care and Sleep Medicine  [facility]  Dr. Conway, Dr. Turcios, Dr. Emmanuel    SUBJECTIVE:  Patient seen and examined.  He remains orally intubated.  Does follow all commands while sedation is weaned.  No further episodes of tremors/seizures    OBJECTIVE:  Vitals:    02/18/24 0900 02/18/24 1000 02/18/24 1100 02/18/24 1240   BP: 125/72 126/71 122/64    Pulse: 71 63 69 61   Resp: 18 19 16 16   Temp:  98.6 °F (37 °C)     TempSrc:       SpO2: 98% 99% 100% 99%   Weight:       Height:           1. General: Off sedation at time of my exam ill-appearing  2. Eyes: Vision - grossly normal, PERRLA   3. HENT: Head is atraumatic & normocephalic. Neck Supple, No jugular venous distention   4. Respiratory: Lungs are clear to auscultation, Respirations are non-labored, Breath sounds are equal   5. Cardiovascular: S1, S2 normal, Regular rate & rhythm, No murmur, No pedal edema   6. Gastrointestinal: Soft, Non-tender, Non-distended, Normal bowel sounds. No organomegaly.  7. Neurologic: Awake when sedation turned off cranial nerves 2-12 grossly intact, No focal motor or sensory deficits.  8. Skin: no rashes, breakdown  9. Musculoskeletal: no LE edema, no joint effusion.  10. Psychiatric -unable to assess    DATA:    CBC:   Lab Results   Component Value Date    WBC 6.9 02/18/2024    HGB 9.3 (L) 02/18/2024    HCT 27.5 (L) 02/18/2024    MCV 97.5 02/18/2024    PLT 35 (L) 02/18/2024     LFTs:   Lab Results   Component Value Date    ALT 27 02/18/2024     (H) 02/18/2024    ALKPHOS 133 (H) 02/18/2024    BILITOT 8.1 (H) 02/18/2024    PROT 6.5 02/18/2024     Coags:   Lab Results   Component Value Date    INR 1.5 02/16/2024       RADIOLOGY:      Chest x-ray reviewed and remained stable.  Endotracheal tube and central line in place      CBC with Differential:    Lab Results   Component 
         Critical Care Team - Daily Progress Note         Date and time: 2/19/2024 9:58 AM  Patient's name:  Shahzad Rodgers  Medical Record Number: [id number]  Patient's account/billing number: [id number]  Patient's YOB: 1955  Age: 68 y.o.  Date of Admission: 2/14/2024 10:03 AM  Length of stay during current admission: 5      Primary Care Physician: Fly Sequeira MD  ICU Attending Physician: Alber Loredo DO    Code Status: Full Code    Reason for ICU admission: CPA      SUBJECTIVE     2/19: extubated yesterday, ra today. Remains confused. Not oriented to place or time.    OBJECTIVE     Vital signs:   height is 1.803 m (5' 11\") and weight is 82.3 kg (181 lb 6.4 oz). His bladder temperature is 97.3 °F (36.3 °C). His blood pressure is 112/59 (abnormal) and his pulse is 65. His respiration is 21 and oxygen saturation is 93%.     I/O last 3 completed shifts:  In: 6462.8 [I.V.:4762.1; NG/GT:50; IV Piggyback:1650.7]  Out: 3775 [Urine:3475; Emesis/NG output:300]      PHYSICAL EXAM:    Physical Exam  Constitutional:       General: He is not in acute distress.     Appearance: He is ill-appearing.   HENT:      Head: Normocephalic and atraumatic.      Mouth/Throat:      Pharynx: No oropharyngeal exudate.   Eyes:      General: No scleral icterus.     Conjunctiva/sclera: Conjunctivae normal.   Neck:      Trachea: No tracheal deviation.   Cardiovascular:      Rate and Rhythm: Normal rate.      Heart sounds: Normal heart sounds.   Pulmonary:      Effort: Pulmonary effort is normal.      Breath sounds: Normal breath sounds.   Abdominal:      Palpations: Abdomen is soft.      Tenderness: There is no abdominal tenderness.   Musculoskeletal:         General: No swelling or deformity.      Cervical back: Neck supple.   Lymphadenopathy:      Cervical: No cervical adenopathy.   Skin:     General: Skin is warm.      Findings: No rash.   Neurological:      General: No focal deficit present.      Mental Status: He is alert. 
         Critical Care Team - Daily Progress Note         Date and time: 2/20/2024 10:16 AM  Patient's name:  Shahzad Rodgers  Medical Record Number: [id number]  Patient's account/billing number: [id number]  Patient's YOB: 1955  Age: 68 y.o.  Date of Admission: 2/14/2024 10:03 AM  Length of stay during current admission: 6      Primary Care Physician: Fly Sequeira MD  ICU Attending Physician: Alber Loredo DO    Code Status: Full Code    Reason for ICU admission: CPA      SUBJECTIVE     2/19: extubated yesterday, ra today. Remains confused. Not oriented to place or time.    2/20: pulled out pierce and having gross hematuria, still confused    OBJECTIVE     Vital signs:   height is 1.803 m (5' 11\") and weight is 82.3 kg (181 lb 6.4 oz). His temporal temperature is 97.8 °F (36.6 °C). His blood pressure is 123/80 and his pulse is 70. His respiration is 20 and oxygen saturation is 100%.     I/O last 3 completed shifts:  In: 4957.7 [P.O.:50; I.V.:3640; IV Piggyback:1267.7]  Out: 2705 [Urine:2705]      PHYSICAL EXAM:    Physical Exam  Constitutional:       General: He is not in acute distress.     Appearance: He is ill-appearing.   HENT:      Head: Normocephalic and atraumatic.      Mouth/Throat:      Pharynx: No oropharyngeal exudate.   Eyes:      General: No scleral icterus.     Conjunctiva/sclera: Conjunctivae normal.   Neck:      Trachea: No tracheal deviation.   Cardiovascular:      Rate and Rhythm: Normal rate.      Heart sounds: Normal heart sounds.   Pulmonary:      Effort: Pulmonary effort is normal.      Breath sounds: Normal breath sounds.   Abdominal:      Palpations: Abdomen is soft.      Tenderness: There is no abdominal tenderness.   Musculoskeletal:         General: No swelling or deformity.      Cervical back: Neck supple.   Lymphadenopathy:      Cervical: No cervical adenopathy.   Skin:     General: Skin is warm.      Findings: No rash.   Neurological:      General: No focal deficit 
        PROGRESS NOTE      Chief Complaint:   Left non obstructive nephrolithiasis   H/O PCA (S/P RRP 2005/Adjuvant radiation 2006 at the [facility])  Ascites   Gross hematuria  Traumatic catheter removal    HPI:   He is feeling much better and hopes to go to [facility] soon.  A sitter is at his bedside.  He denies any pain or catheter discomfort    Vitals:    02/27/24 0700   BP: (!) 124/58   Pulse: 65   Resp: 18   Temp: 98.6 °F (37 °C)   SpO2: 95%       Allergies: Penicillins    PAST MEDICAL HISTORY:   Past Medical History:   Diagnosis Date    Alcoholism (HCC)     Prostate cancer (HCC)        PAST SURGICAL HISTORY:   Past Surgical History:   Procedure Laterality Date    CARDIAC PROCEDURE N/A 02/23/2024    Left heart cath / coronary angiography performed by Arvind Lowe DO at [facility] [place] [facility]    CARDIAC PROCEDURE N/A 02/23/2024    Percutaneous coronary intervention performed by Arvind Lowe DO at [facility] [place] [facility]ARDIAC CATH [facility]    COLONOSCOPY N/A 01/16/2024    COLORECTAL CANCER SCREENING, NOT HIGH RISK performed by Leroy Hernandez MD at [facility] [place] ENDOSCOPY    PARACENTESIS Right 09/22/2023    6200mL clear yellow fluid removed    PARACENTESIS Right 10/06/2023    4850 mL drained clear yellow    PARACENTESIS Right 01/26/2024    1175mL barb fluid removed. OSC-5F-10T kit    PARACENTESIS Right 02/26/2024    6100 bright yellow    PROSTATECTOMY  2006    UPPER GASTROINTESTINAL ENDOSCOPY N/A 01/16/2024    EGD ESOPHAGOGASTRODUODENOSCOPY performed by Leroy Hernandez MD at [facility] [place] [facility]OSCOPY        PAST FAMILY HISTORY:    Family History   Problem Relation Age of Onset    Diabetes Mother        PAST SOCIAL HISTORY:    Social History     Socioeconomic History    Marital status: Single     Spouse name: None    Number of children: None    Years of education: None    Highest education level: None   Tobacco Use    Smoking status: Every Day     Current packs/day: 0.50     Average packs/day: 0.5 packs/day for 54.2 years (27.1 ttl pk-yrs) 
        PROGRESS NOTE      Chief Complaint:   Left non obstructive nephrolithiasis   H/O PCA (S/P RRP 2005/Adjuvant radiation 2006 at the [facility])  Ascites   Gross hematuria  Traumatic catheter removal    HPI:   He is more alert today but confused.  He traumatically removed his catheter this morning with the balloon inflated.  He denies any pain.    Vitals:    02/20/24 1200   BP: 122/78   Pulse: 71   Resp: 17   Temp: 98 °F (36.7 °C)   SpO2: 99%       Allergies: Penicillins    PAST MEDICAL HISTORY:   Past Medical History:   Diagnosis Date    Alcoholism (HCC)     Prostate cancer (HCC)        PAST SURGICAL HISTORY:   Past Surgical History:   Procedure Laterality Date    COLONOSCOPY N/A 01/16/2024    COLORECTAL CANCER SCREENING, NOT HIGH RISK performed by Leroy Hernandez MD [place] [facility] [place]    PARACENTESIS Right 09/22/2023    6200mL clear yellow fluid removed    PARACENTESIS Right 10/06/2023    4850 mL drained clear yellow    PARACENTESIS Right 01/26/2024    1175mL barb fluid removed. OSC-5F-10T kit    PROSTATECTOMY  2006    UPPER GASTROINTESTINAL ENDOSCOPY N/A 01/16/2024    EGD ESOPHAGOGASTRODUODENOSCOPY performed by Leroy Hernandez MD at [facility] [place] [facility]        PAST FAMILY HISTORY:    Family History   Problem Relation Age of Onset    Diabetes Mother        PAST SOCIAL HISTORY:    Social History     Socioeconomic History    Marital status: Single   Tobacco Use    Smoking status: Every Day     Current packs/day: 0.50     Average packs/day: 0.5 packs/day for 54.1 years (27.1 ttl pk-yrs)     Types: Cigarettes     Start date: 1970    Smokeless tobacco: Never   Vaping Use    Vaping Use: Never used   Substance and Sexual Activity    Alcohol use: Not Currently     Comment: Was drinking 4-6 shots of whiskey daily, quit drinking Jan 2023    Drug use: Never    Sexual activity: Not Currently     Social Determinants of Health     Financial Resource Strain: Low Risk  (5/11/2023)    Overall Financial Resource Strain (CARDIA)     
       [facility] Hospitalist Progress Note    SYNOPSIS: Patient admitted on 2/14/2024 for Syncope and collapse    68-year-old male past medical history of alcohol cirrhosis, ascites, grade 2 esophageal varices, GI bleed secondary to portal hypertension gastropathy status post bipolar cautery January 16, 2024 with colon polyps removed at that time, prostate cancer presented due to confusion and hypotension.  Also had multiple falls with concern for closed head injury.  Patient had loss of consciousness and head trauma while trying to go to the bathroom by himself CT brain was negative for any bleed.  Patient had polymorphic V. tach due to electro abnormalities with low magnesium and potassium.  Patient was intubated and required CPR for 4 minutes    Defibrillation which was achieved.  Admitted to ICU and intubated.  Also found to have new onset cardiomyopathy EF 35 to 40% on echo 2/19/2024.  Also required 1 unit of blood transfusion during hospital stay.  Also concern for seizure-like activity for which EEG has been obtained neurosurgery following.  Started on Keppra.  Could not have MRI brain as patient could not answer the questions.  Given concern for fever he was started on antibiotics also required pressors in the ICU.  Cardiology recommended keeping magnesium greater than 2 potassium greater than 4 due to underlying prolonged QT and risk of polymorphic VT.  2/23 heart cath   Cardiac catheterization demonstrated an 80% obtuse marginal and 75% LAD stenosis.  Successful stenting of the obtuse marginal with residual dissection requiring additional stenting.  Still widely patent with DIONE-3 flow.  The LAD stenosis is significant but not critical with normal flow.  Consider medical therapy versus staged intervention in the future.    IR consulted for paracentesis as he was scheduled as OP for paracentesis for Monday.  GI consulted for worsening transamnitis  Low therapy score, will benefit from placement 
       [facility] Hospitalist Progress Note    Admitting Date and Time: 2/14/2024 10:03 AM  Admit Dx: Syncope and collapse [R55]  Upper GI bleeding [K92.2]  Patient is a 68-year-old male who presented on 2/14 with syncope and collapse. past medical history of alcohol cirrhosis, ascites, grade 2 esophageal varices, GI bleed secondary to portal hypertension gastropathy status post bipolar cautery January 16, 2024 with colon polyps removed at that time, prostate cancer presented due to confusion and hypotension. Also had multiple falls with concern for closed head injury. Patient had loss of consciousness and head trauma while trying to go to the bathroom by himself CT brain was negative for any bleed. Patient had polymorphic V. tach due to electro abnormalities with low magnesium and potassium. Patient was intubated and required CPR for 4 minutes status post defibrillation, admitted to the ICU, extubated, found to have new onset cardiomyopathy with EF 35 to 40% on echo 2/19.  Concern for seizure-like activity status post EEG, started on Keppra, MRI was unable to be obtained.  Concern for fever, started on antibiotics and required pressors in the ICU.  Cardiology and EP following, status post heart cath 2/23 which showed 80% obtuse marginal and 75% LAD stenosis.  Successful stenting of the obtuse marginal with residual dissection requiring additional stenting.  Still widely patent with DIONE-3 flow.  The LAD stenosis is significant but not critical with normal flow.  Per cardiology, consider medical therapy versus staged intervention in the future.  IR consulted for paracentesis scheduled for today 2/26.  GI consulted for worsening transaminitis-pending.      Subjective:  Patient is being followed for Syncope and collapse [R55]  Upper GI bleeding [K92.2]   Patient seen and examined.  Patient anxiously awaiting paracentesis.    ROS: denies fever, chills, cp, sob, n/v, HA unless stated above.      potassium phosphate 
       [facility] Hospitalist Progress Note    Admitting Date and Time: 2/14/2024 10:03 AM  Admit Dx: Syncope and collapse [R55]  Upper GI bleeding [K92.2]    Synopsis: Patient is a 68-year-old male with past medical history of alcoholic liver cirrhosis, esophageal varices, alcohol abuse who came in for a fall.  In the emergency department he was found his hemoglobin is down trended and he was transfused 1 unit of packed RBC.  Patient was admitted for further workup.    Subjective:  Patient is being followed for Syncope and collapse [R55]  Upper GI bleeding [K92.2]   Pt feels okay this morning.  He reports that he has been following repeatedly for the past few weeks.  He says sometimes he has a prodrome but most times he just blacks out and falls.  No known cardiac history.    ROS: denies fever, chills, cp, sob, n/v, HA unless stated above.      furosemide  40 mg Oral BID    nadolol  20 mg Oral Daily    pantoprazole  40 mg Oral BID AC    potassium chloride  20 mEq Oral Daily    sodium chloride flush  5-40 mL IntraVENous 2 times per day     perflutren lipid microspheres, 1.5 mL, ONCE PRN  sodium chloride, , PRN  sodium chloride flush, 5-40 mL, PRN  sodium chloride, , PRN  potassium chloride, 40 mEq, PRN   Or  potassium alternative oral replacement, 40 mEq, PRN   Or  potassium chloride, 10 mEq, PRN  magnesium sulfate, 2,000 mg, PRN  ondansetron, 4 mg, Q8H PRN   Or  ondansetron, 4 mg, Q6H PRN  polyethylene glycol, 17 g, Daily PRN  acetaminophen, 650 mg, Q6H PRN   Or  acetaminophen, 650 mg, Q6H PRN         Objective:    /62   Pulse 60   Temp 98.2 °F (36.8 °C) (Temporal)   Resp 18   Ht 1.803 m (5' 11\")   Wt 72.6 kg (160 lb)   SpO2 97%   BMI 22.32 kg/m²     General Appearance: alert and oriented to person, place and time and in no acute distress  Skin: warm and dry  Head: normocephalic positive bruising left eye.  Eyes: pupils equal, round, and reactive to light, extraocular eye movements intact, 
       [facility] Hospitalist Progress Note    SYNOPSIS: Patient admitted on 2/14/2024 for Syncope and collapse    68-year-old male past medical history of alcohol cirrhosis, ascites, grade 2 esophageal varices, GI bleed secondary to portal hypertension gastropathy status post bipolar cautery January 16, 2024 with colon polyps removed at that time, prostate cancer presented due to confusion and hypotension.  Also had multiple falls with concern for closed head injury.  Patient had loss of consciousness and head trauma while trying to go to the bathroom by himself CT brain was negative for any bleed.  Patient had polymorphic V. tach due to electro abnormalities with low magnesium and potassium.  Patient was intubated and required CPR for 4 minutes    Defibrillation which was achieved.  Admitted to ICU and intubated.  Also found to have new onset cardiomyopathy EF 35 to 40% on echo 2/19/2024.  Also required 1 unit of blood transfusion during hospital stay.  Also concern for seizure-like activity for which EEG has been obtained neurosurgery following.  Started on Keppra.  Could not have MRI brain as patient could not answer the questions.  Given concern for fever he was started on antibiotics also required pressors in the ICU.  Cardiology recommended keeping magnesium greater than 2 potassium greater than 4 due to underlying prolonged QT and risk of polymorphic VT.  2/23 heart cath   Cardiac catheterization demonstrated an 80% obtuse marginal and 75% LAD stenosis.  Successful stenting of the obtuse marginal with residual dissection requiring additional stenting.  Still widely patent with DIONE-3 flow.  The LAD stenosis is significant but not critical with normal flow.  Consider medical therapy versus staged intervention in the future.    IR consulted for paracentesis as he was scheduled as OP for paracentesis for Monday.  GI consulted for worsening transamnitis    SUBJECTIVE:  Stable overnight. No other overnight 
       [facility] Hospitalist Progress Note    Admitting Date and Time: 2/14/2024 10:03 AM  Admit Dx: Syncope and collapse [R55]  Upper GI bleeding [K92.2]    Synopsis: Patient is a 68-year-old male with past medical history of alcoholic liver cirrhosis, esophageal varices, alcohol abuse who came in for a fall.  In the emergency department he was found his hemoglobin is down trended and he was transfused 1 unit of packed RBC.  Patient was admitted for further workup.  RRT was called 2/15/2024 for falling in the bathroom.  Patient underwent a repeat CT scan which was unremarkable for any acute process.  On the morning of 2/16/2024 CODE BLUE was called patient was found to be in V. tach/V-fib.  Patient was shocked and coded for total of 5 minutes.  Patient was intubated and transferred subsequently to the ICU    Subjective:  Patient is being followed for Syncope and collapse [R55]  Upper GI bleeding [K92.2]     CODE BLUE was called patient was found to be having shaking per nursing staff patient was found to be in in V. tach/V-fib.  Patient was shocked and coded for total of 5 minutes.  Patient was intubated and transferred subsequently to the ICU    ROS: Unable to obtain     melatonin  5 mg Oral QPM    potassium chloride  10 mEq IntraVENous Q1H    [Held by provider] furosemide  40 mg Oral BID    nadolol  20 mg Oral Daily    pantoprazole  40 mg Oral BID AC    potassium chloride  20 mEq Oral Daily    sodium chloride flush  5-40 mL IntraVENous 2 times per day     melatonin, 5 mg, Nightly PRN  hydrALAZINE, 10 mg, Q6H PRN  magnesium - glycerin - water, , Once PRN  calcium carbonate, 500 mg, TID PRN  Polyvinyl Alcohol-Povidone PF, 1 drop, PRN  sodium chloride, 1 spray, PRN  magnesium hydroxide, 30 mL, Daily PRN  benzocaine-menthol, 1 lozenge, Q2H PRN  benzonatate, 100 mg, TID PRN  glucose, 4 tablet, PRN  dextrose bolus, 125 mL, PRN   Or  dextrose bolus, 250 mL, PRN  glucagon (rDNA), 1 mg, PRN  dextrose, , Continuous 
       [facility] Hospitalist Progress Note    Admitting Date and Time: 2/14/2024 10:03 AM  Admit Dx: Syncope and collapse [R55]  Upper GI bleeding [K92.2]  Patient is a 68-year-old male who presented on 2/14 with syncope and collapse. past medical history of alcohol cirrhosis, ascites, grade 2 esophageal varices, GI bleed secondary to portal hypertension gastropathy status post bipolar cautery January 16, 2024 with colon polyps removed at that time, prostate cancer presented due to confusion and hypotension. Also had multiple falls with concern for closed head injury. Patient had loss of consciousness and head trauma while trying to go to the bathroom by himself CT brain was negative for any bleed. Patient had polymorphic V. tach due to electro abnormalities with low magnesium and potassium. Patient was intubated and required CPR for 4 minutes status post defibrillation, admitted to the ICU, extubated, found to have new onset cardiomyopathy with EF 35 to 40% on echo 2/19.  Concern for seizure-like activity status post EEG, started on Keppra, MRI was unable to be obtained.  Concern for fever, started on antibiotics and required pressors in the ICU.  Cardiology and EP following, status post heart cath 2/23 which showed 80% obtuse marginal and 75% LAD stenosis.  Successful stenting of the obtuse marginal with residual dissection requiring additional stenting.  Still widely patent with DIONE-3 flow.  The LAD stenosis is significant but not critical with normal flow.  Per cardiology, consider medical therapy versus staged intervention in the future.  IR consulted for paracentesis scheduled for today 2/26.  GI consulted for worsening transaminitis-pending.      Subjective:  Patient is being followed for Syncope and collapse [R55]  Upper GI bleeding [K92.2]   Patient seen and examined.  No new complaints or concerns.    Discharge planning.  Patient is rejected from [facility] and guardian.  Patient will need new 
       [facility] Hospitalist Progress Note    Admitting Date and Time: 2/14/2024 10:03 AM  Admit Dx: Syncope and collapse [R55]  Upper GI bleeding [K92.2]  Patient is a 68-year-old male who presented on 2/14 with syncope and collapse. past medical history of alcohol cirrhosis, ascites, grade 2 esophageal varices, GI bleed secondary to portal hypertension gastropathy status post bipolar cautery January 16, 2024 with colon polyps removed at that time, prostate cancer presented due to confusion and hypotension. Also had multiple falls with concern for closed head injury. Patient had loss of consciousness and head trauma while trying to go to the bathroom by himself CT brain was negative for any bleed. Patient had polymorphic V. tach due to electro abnormalities with low magnesium and potassium. Patient was intubated and required CPR for 4 minutes status post defibrillation, admitted to the ICU, extubated, found to have new onset cardiomyopathy with EF 35 to 40% on echo 2/19.  Concern for seizure-like activity status post EEG, started on Keppra, MRI was unable to be obtained.  Concern for fever, started on antibiotics and required pressors in the ICU.  Cardiology and EP following, status post heart cath 2/23 which showed 80% obtuse marginal and 75% LAD stenosis.  Successful stenting of the obtuse marginal with residual dissection requiring additional stenting.  Still widely patent with DIONE-3 flow.  The LAD stenosis is significant but not critical with normal flow.  Per cardiology, consider medical therapy versus staged intervention in the future.  IR consulted for paracentesis scheduled for today 2/26.  GI consulted for worsening transaminitis-pending.      Subjective:  Patient is being followed for Syncope and collapse [R55]  Upper GI bleeding [K92.2]   Patient seen and examined.  Feels better after paracentesis yesterday.    ROS: denies fever, chills, cp, sob, n/v, HA unless stated above.      potassium phosphate 
       [facility] Hospitalist Progress Note    SYNOPSIS: Patient admitted on 2024 for Syncope and collapse    68-year-old male past medical history of alcohol cirrhosis, ascites, grade 2 esophageal varices, GI bleed secondary to portal hypertension gastropathy status post bipolar cautery 2024 with colon polyps removed at that time, prostate cancer presented due to confusion and hypotension.  Also had multiple falls with concern for closed head injury.  Patient had loss of consciousness and head trauma while trying to go to the bathroom by himself CT brain was negative for any bleed.  Patient had polymorphic V. tach due to electro abnormalities with low magnesium and potassium.  Patient was intubated and required CPR for 4 minutes    Defibrillation which was achieved.  Admitted to ICU and intubated.  Also found to have new onset cardiomyopathy EF 35 to 40% on echo 2024.  Also required 1 unit of blood transfusion during hospital stay.  Also concern for seizure-like activity for which EEG has been obtained neurosurgery following.  Started on Keppra.  Could not have MRI brain as patient could not answer the questions.  Given concern for fever he was started on antibiotics also required pressors in the ICU.  Cardiology recommended keeping magnesium greater than 2 potassium greater than 4 due to underlying prolonged QT and risk of polymorphic VT.  Cardiac catheterization per cardiology  .  IR consulted for paracentesis as he was scheduled as OP for paracentesis.    SUBJECTIVE:  Stable overnight. No other overnight issues reported.   Patient seen and examined  Records reviewed.   Feels better       Temp (24hrs), Av.9 °F (36.6 °C), Min:97.2 °F (36.2 °C), Max:98.7 °F (37.1 °C)    DIET: ADULT ORAL NUTRITION SUPPLEMENT; Breakfast, Dinner; Diabetic Oral Supplement  ADULT DIET; Regular; 4 carb choices (60 gm/meal)  CODE: Full Code    Intake/Output Summary (Last 24 hours) at 20240  Last 
    Hospitalist Progress Note      SYNOPSIS: Patient admitted on 2024 for Syncope and collapse      SUBJECTIVE:    Patient seen and examined. Answers question but still confused.  Records reviewed.     68-year-old male past medical history of alcohol cirrhosis, ascites, grade 2 esophageal varices, GI bleed secondary to portal hypertension gastropathy status post bipolar cautery 2024 with colon polyps removed at that time, prostate cancer presented due to confusion and hypotension.  Also had multiple falls with concern for closed head injury.  Patient had loss of consciousness and head trauma while trying to go to the bathroom by himself CT brain was negative for any bleed.  Patient had polymorphic V. tach due to electro abnormalities with low magnesium and potassium.  Patient was intubated and required CPR for 4 minutes  Defibrillation which was achieved.  Admitted to ICU and intubated.  Also found to have new onset cardiomyopathy EF 35 to 40% on echo 2024.  Also required 1 unit of blood transfusion during hospital stay.  Also concern for seizure-like activity for which EEG has been obtained neurosurgery following.  Given concern for fever will start on antibiotics also required pressors. .  Cardiology recommended keeping magnesium greater than 2 potassium greater than 4 due to underlying prolonged QT and risk of polymorphic VT.  Cardiac catheterization per cardiology once mobility is improving underlying thrombocytopenia.    Stable overnight. No other overnight issues reported.   Temp (24hrs), Av.6 °F (36.4 °C), Min:97 °F (36.1 °C), Max:98 °F (36.7 °C)    DIET: Diet NPO  CODE: Full Code    Intake/Output Summary (Last 24 hours) at 2024 0831  Last data filed at 2024 0600  Gross per 24 hour   Intake 2418.39 ml   Output 1370 ml   Net 1048.39 ml       OBJECTIVE:    BP (!) 141/84   Pulse 63   Temp 97.9 °F (36.6 °C) (Temporal)   Resp 18   Ht 1.803 m (5' 11\")   Wt 82.3 kg (181 lb 
    Hospitalist Progress Note      SYNOPSIS: Patient admitted on 2024 for Syncope and collapse      SUBJECTIVE:    Patient seen and examined. Mental status is much improved.  Answering questions appropriately.  Tolerating oral intake.  Denies any pain at this time.  Records reviewed.     68-year-old male past medical history of alcohol cirrhosis, ascites, grade 2 esophageal varices, GI bleed secondary to portal hypertension gastropathy status post bipolar cautery 2024 with colon polyps removed at that time, prostate cancer presented due to confusion and hypotension.  Also had multiple falls with concern for closed head injury.  Patient had loss of consciousness and head trauma while trying to go to the bathroom by himself CT brain was negative for any bleed.  Patient had polymorphic V. tach due to electro abnormalities with low magnesium and potassium.  Patient was intubated and required CPR for 4 minutes  Defibrillation which was achieved.  Admitted to ICU and intubated.  Also found to have new onset cardiomyopathy EF 35 to 40% on echo 2024.  Also required 1 unit of blood transfusion during hospital stay.  Also concern for seizure-like activity for which EEG has been obtained neurosurgery following.  Started on Keppra.  Could not have MRI brain as patient could not answer the questions.  Given concern for fever he was started on antibiotics also required pressors in the ICU.  Cardiology recommended keeping magnesium greater than 2 potassium greater than 4 due to underlying prolonged QT and risk of polymorphic VT.  Cardiac catheterization per cardiology once mobility is improving underlying thrombocytopenia.    Stable overnight. No other overnight issues reported.   Temp (24hrs), Av.5 °F (32.5 °C), Min:48.2 °F (9 °C), Max:98 °F (36.7 °C)    DIET: ADULT DIET; Regular; 3 carb choices (45 gm/meal)  CODE: Full Code    Intake/Output Summary (Last 24 hours) at 2024 1908  Last data filed at 
    Hospitalist Progress Note      Synopsis: Patient admitted on 2/14/2024 who presented for frequent falls at home. History of alcoholic liver cirrhosis with stage II esophageal varices. On admission, lactate was elevated at 6.1 with improvement to 4.5. Elevated AST and ALT. Troponin 17-->17. Thrombocytopenia with platelets of 35. EKG showed possible lead reversal. Sinus rhythm. Non specific changes and prolonged Qtc. CT head was done and negative. CT chest, abdomen, and pelvis  showed a small left pleural effusion, mild cardiomegaly, findings c/w esophagitis, cirrhosis and moderate ascites, and bilateral gynecomastia. CT cervical, thoracic, and lumbar with no acute trauma. UA with no signs of infection but did show hematuria. PSA <0.01. Urology consulted.  They recommended sending urine for cytology given tobacco usage history.  Given concerns for possible syncopal episodes, Hemoglobin was trending down and positive stool for occult blood. He was transfused 1 unit pRBCs. During stay, patient sustained a fall. He had a repeat CTH which was negative for acute process. There was concern for possible seizure like activity at that time and ativan was given. Patient further deteriorated and Code Blue was called. Patient was in Vfib. He received 2 GM of Mag and 1 of epinephrine and defibrillation. Patient did receive ROSC after about 4 minutes and was transferred to MICU. EP was consulted. Recommended aggressive K and Mag replacement with goals of Mag >2 and K >4. EP recommending possible ischemic evaluation when more stable and repeated limited ECHO post arrest. ECHO was done that showed EF of 50-55%. Normal wall thickness. Normal wall motion. Indeterminate diastolic function.  While intubated in MICU concern for seizure like activity, patient was given 4 mg of Versed. Keppra started and and neurology consulted. EEG ordered. Patient also developed fever. He was started on Azactam and Vancomycin. Cultures obtained. 
   PROGRESS NOTE     CARDIOLOGY    Chief complaint: Seen today for follow up, management & recommendations for CAD, cardiomyopathy    HPI: No new overnight cardiac complaints. He denies chest pain, worsening SOB, or palpitations. S/p paracentesis on 2/26/24. SR on telemetry.    Cardiac: As per HPI  General: No fever, chills  Pulmonary: As per HPI  HEENT: No visual disturbances, difficult swallowing  GI: No nausea, vomiting  : No dysuria, hematuria  Endocrine: No thyroid disease or DM  Musculoskeletal: EDWARDS x 4, no focal motor deficits  Skin: Intact, no rashes  Neuro: No headache, seizures  Psych: Currently with no depression, anxiety      Wt Readings from Last 3 Encounters:   02/18/24 82.3 kg (181 lb 6.4 oz)   01/16/24 72.6 kg (160 lb)   12/15/23 68 kg (150 lb)     Temp Readings from Last 3 Encounters:   02/28/24 98.2 °F (36.8 °C) (Temporal)   01/26/24 98.6 °F (37 °C) (Temporal)   01/16/24 98.1 °F (36.7 °C) (Temporal)     BP Readings from Last 3 Encounters:   02/28/24 (!) 157/66   01/26/24 129/61   01/16/24 130/66     Pulse Readings from Last 3 Encounters:   02/28/24 64   01/26/24 84   01/16/24 88         Intake/Output Summary (Last 24 hours) at 2/28/2024 0904  Last data filed at 2/28/2024 0646  Gross per 24 hour   Intake 270 ml   Output 2500 ml   Net -2230 ml       Recent Labs     02/26/24  0501 02/27/24  0456 02/28/24  0426   WBC 8.3 7.4 7.4   HGB 9.6* 9.5* 9.8*   HCT 29.2* 28.5* 29.6*   .7* 102.2* 102.4*     Recent Labs     02/26/24  0501 02/27/24  0456 02/28/24  0426    138 138   K 4.0 3.9 4.0    107 105   CO2 24 22 22   PHOS 2.0* 1.9* 2.3*   BUN 12 10 9   CREATININE 0.7 0.6* 0.6*   MG 1.8 1.8 1.9     Recent Labs     02/26/24  0501   PROTIME 20.3*   INR 1.9     No results for input(s): \"CKTOTAL\", \"CKMB\", \"CKMBINDEX\", \"TROPONINI\" in the last 72 hours.  No results for input(s): \"BNP\" in the last 72 hours.  No results for input(s): \"CHOL\", \"HDL\", \"TRIG\" in the last 72 hours.    Invalid input(s): 
   PROGRESS NOTE     CARDIOLOGY    Chief complaint: Seen today for follow up, management & recommendations for CAD, cardiomyopathy    HPI: No new overnight cardiac complaints. He denies chest pain, worsening SOB, or palpitations. S/p paracentesis on 2/26/24. SR on telemetry.    Cardiac: As per HPI  General: No fever, chills  Pulmonary: As per HPI  HEENT: No visual disturbances, difficult swallowing  GI: No nausea, vomiting  : No dysuria, hematuria  Endocrine: No thyroid disease or DM  Musculoskeletal: EDWARDS x 4, no focal motor deficits  Skin: Intact, no rashes  Neuro: No headache, seizures  Psych: Currently with no depression, anxiety      Wt Readings from Last 3 Encounters:   02/18/24 82.3 kg (181 lb 6.4 oz)   01/16/24 72.6 kg (160 lb)   12/15/23 68 kg (150 lb)     Temp Readings from Last 3 Encounters:   02/29/24 98.1 °F (36.7 °C) (Temporal)   01/26/24 98.6 °F (37 °C) (Temporal)   01/16/24 98.1 °F (36.7 °C) (Temporal)     BP Readings from Last 3 Encounters:   02/29/24 (!) 123/52   01/26/24 129/61   01/16/24 130/66     Pulse Readings from Last 3 Encounters:   02/29/24 68   01/26/24 84   01/16/24 88         Intake/Output Summary (Last 24 hours) at 2/29/2024 0858  Last data filed at 2/29/2024 0711  Gross per 24 hour   Intake 600 ml   Output 4300 ml   Net -3700 ml       Recent Labs     02/27/24  0456 02/28/24  0426 02/29/24  0418   WBC 7.4 7.4 7.7   HGB 9.5* 9.8* 10.1*   HCT 28.5* 29.6* 31.0*   .2* 102.4* 102.0*     Recent Labs     02/27/24  0456 02/28/24  0426 02/29/24  0418    138 139   K 3.9 4.0 3.4*    105 103   CO2 22 22 23   PHOS 1.9* 2.3* 2.3*   BUN 10 9 8   CREATININE 0.6* 0.6* 0.6*   MG 1.8 1.9 1.7     Lab Results   Component Value Date/Time    MG 1.7 02/29/2024 04:18 AM     No results for input(s): \"PROTIME\", \"INR\" in the last 72 hours.    No results for input(s): \"CKTOTAL\", \"CKMB\", \"CKMBINDEX\", \"TROPONINI\" in the last 72 hours.  No results for input(s): \"BNP\" in the last 72 hours.  No 
   PROGRESS NOTE     CARDIOLOGY    Chief complaint: Seen today for follow up, management & recommendations for cardiomyopathy    He is still a little confused but improving.  He is alert and oriented x 3 and answering questions appropriately today.  Still not a great historian but answering questions appropriately.  Chest is sore and denies any shortness of breath.    Wt Readings from Last 3 Encounters:   02/18/24 82.3 kg (181 lb 6.4 oz)   01/16/24 72.6 kg (160 lb)   12/15/23 68 kg (150 lb)     Temp Readings from Last 3 Encounters:   02/22/24 97.6 °F (36.4 °C) (Temporal)   01/26/24 98.6 °F (37 °C) (Temporal)   01/16/24 98.1 °F (36.7 °C) (Temporal)     BP Readings from Last 3 Encounters:   02/22/24 131/68   01/26/24 129/61   01/16/24 130/66     Pulse Readings from Last 3 Encounters:   02/22/24 72   01/26/24 84   01/16/24 88         Intake/Output Summary (Last 24 hours) at 2/22/2024 1110  Last data filed at 2/22/2024 0635  Gross per 24 hour   Intake 120 ml   Output 1300 ml   Net -1180 ml       Recent Labs     02/20/24  0358 02/22/24  0436   WBC 8.3 7.3   HGB 11.4* 10.8*   HCT 33.8* 32.5*   MCV 99.4 100.0*     Recent Labs     02/19/24  1530 02/20/24  0358 02/21/24  0904 02/22/24  0436    140 136 136   K 3.7 3.5 3.2* 4.7    102 101 103   CO2 26 23 26 21*   PHOS 1.7* 1.2*  --  0.9*   BUN 16 18 19 16   CREATININE 0.5* 0.5* 0.6* 0.5*   MG 2.2 1.9 1.9 2.0     No results for input(s): \"PROTIME\", \"INR\" in the last 72 hours.  No results for input(s): \"CKTOTAL\", \"CKMB\", \"CKMBINDEX\", \"TROPONINI\" in the last 72 hours.  No results for input(s): \"BNP\" in the last 72 hours.  No results for input(s): \"CHOL\", \"HDL\", \"TRIG\" in the last 72 hours.    Invalid input(s): \"CHOLHDLR\", \"LDLCALCU\"  No results for input(s): \"TROPHS\" in the last 72 hours.        sodium phosphate 15 mmol in sodium chloride 0.9 % 250 mL IVPB, PRN  prochlorperazine (COMPAZINE) injection 10 mg, Q6H PRN  magnesium sulfate 3,000 mg in sodium chloride 0.9 % 
   PROGRESS NOTE     CARDIOLOGY    Chief complaint: Seen today for follow up, management & recommendations for cardiomyopathy    He is still confused today but a little better than yesterday.    Wt Readings from Last 3 Encounters:   02/18/24 82.3 kg (181 lb 6.4 oz)   01/16/24 72.6 kg (160 lb)   12/15/23 68 kg (150 lb)     Temp Readings from Last 3 Encounters:   02/20/24 (!) 48.2 °F (9 °C)   01/26/24 98.6 °F (37 °C) (Temporal)   01/16/24 98.1 °F (36.7 °C) (Temporal)     BP Readings from Last 3 Encounters:   02/20/24 125/80   01/26/24 129/61   01/16/24 130/66     Pulse Readings from Last 3 Encounters:   02/20/24 68   01/26/24 84   01/16/24 88         Intake/Output Summary (Last 24 hours) at 2/20/2024 1545  Last data filed at 2/20/2024 0917  Gross per 24 hour   Intake 2676.36 ml   Output 700 ml   Net 1976.36 ml       Recent Labs     02/18/24  0420 02/19/24  0417 02/20/24  0358   WBC 6.9 6.0 8.3   HGB 9.3* 10.1* 11.4*   HCT 27.5* 29.8* 33.8*   MCV 97.5 99.3 99.4   PLT 35*  --   --      Recent Labs     02/19/24  0417 02/19/24  1530 02/20/24  0358    139 140   K 3.4* 3.7 3.5    102 102   CO2 25 26 23   PHOS 1.4* 1.7* 1.2*   BUN 15 16 18   CREATININE 0.5* 0.5* 0.5*   MG 1.9 2.2 1.9     No results for input(s): \"PROTIME\", \"INR\" in the last 72 hours.  No results for input(s): \"CKTOTAL\", \"CKMB\", \"CKMBINDEX\", \"TROPONINI\" in the last 72 hours.  No results for input(s): \"BNP\" in the last 72 hours.  No results for input(s): \"CHOL\", \"HDL\", \"TRIG\" in the last 72 hours.    Invalid input(s): \"CHOLHDLR\", \"LDLCALCU\"  Recent Labs     02/17/24  1558   TROPHS 63*         potassium chloride (KLOR-CON M) extended release tablet 40 mEq, PRN   Or  potassium bicarb-citric acid (EFFER-K) effervescent tablet 40 mEq, PRN   Or  potassium chloride 10 mEq/100 mL IVPB (Peripheral Line), PRN  lidocaine (XYLOCAINE) 2 % uro-jet, PRN  hydrocortisone sodium succinate PF (SOLU-CORTEF) 25 mg in sterile water 2 mL injection, Q12H  pantoprazole 
   PROGRESS NOTE     CARDIOLOGY    Chief complaint: Seen today for follow up, management & recommendations for cardiomyopathy    He is very confused.  He is in restraints.  He is trying to crawl out of bed.  He denies chest pain or shortness of breath.  He is in no distress.    Wt Readings from Last 3 Encounters:   02/18/24 82.3 kg (181 lb 6.4 oz)   01/16/24 72.6 kg (160 lb)   12/15/23 68 kg (150 lb)     Temp Readings from Last 3 Encounters:   02/19/24 97 °F (36.1 °C)   01/26/24 98.6 °F (37 °C) (Temporal)   01/16/24 98.1 °F (36.7 °C) (Temporal)     BP Readings from Last 3 Encounters:   02/19/24 114/66   01/26/24 129/61   01/16/24 130/66     Pulse Readings from Last 3 Encounters:   02/19/24 64   01/26/24 84   01/16/24 88         Intake/Output Summary (Last 24 hours) at 2/19/2024 1229  Last data filed at 2/19/2024 1028  Gross per 24 hour   Intake 5509.54 ml   Output 2255 ml   Net 3254.54 ml       Recent Labs     02/17/24  0437 02/18/24  0420 02/19/24  0417   WBC 9.2 6.9 6.0   HGB 10.4* 9.3* 10.1*   HCT 30.1* 27.5* 29.8*   MCV 94.4 97.5 99.3   PLT  --  35*  --      Recent Labs     02/18/24  0420 02/18/24  1533 02/19/24  0417    135 136   K 3.0* 4.2 3.4*   CL 95* 99 100   CO2 26 26 25   PHOS 1.4* 1.7* 1.4*   BUN 17 16 15   CREATININE 0.5* 0.5* 0.5*   MG 1.8 2.4 1.9     No results for input(s): \"PROTIME\", \"INR\" in the last 72 hours.  No results for input(s): \"CKTOTAL\", \"CKMB\", \"CKMBINDEX\", \"TROPONINI\" in the last 72 hours.  No results for input(s): \"BNP\" in the last 72 hours.  No results for input(s): \"CHOL\", \"HDL\", \"TRIG\" in the last 72 hours.    Invalid input(s): \"CHOLHDLR\", \"LDLCALCU\"  Recent Labs     02/17/24  1558   TROPHS 63*         magnesium sulfate 2000 mg in 50 mL IVPB premix, Once  hydrocortisone sodium succinate PF (SOLU-CORTEF) 25 mg in sterile water 2 mL injection, Q12H  pantoprazole (PROTONIX) 40 mg in sodium chloride (PF) 0.9 % 10 mL injection, Daily  DOPamine (INTROPIN) 1600 mcg/mL in dextrose 5% 
   PROGRESS NOTE     CARDIOLOGY    Chief complaint: Seen today for follow up, management & recommendations for cardiomyopathy    He was lying flat in bed.  He was comfortable and in no distress.  He denies any chest discomfort or shortness of breath.    Wt Readings from Last 3 Encounters:   02/18/24 82.3 kg (181 lb 6.4 oz)   01/16/24 72.6 kg (160 lb)   12/15/23 68 kg (150 lb)     Temp Readings from Last 3 Encounters:   02/24/24 97.7 °F (36.5 °C) (Temporal)   01/26/24 98.6 °F (37 °C) (Temporal)   01/16/24 98.1 °F (36.7 °C) (Temporal)     BP Readings from Last 3 Encounters:   02/24/24 (!) 143/78   01/26/24 129/61   01/16/24 130/66     Pulse Readings from Last 3 Encounters:   02/24/24 82   01/26/24 84   01/16/24 88         Intake/Output Summary (Last 24 hours) at 2/24/2024 1423  Last data filed at 2/24/2024 1107  Gross per 24 hour   Intake 7273.52 ml   Output 2710 ml   Net 4563.52 ml       Recent Labs     02/22/24  0436 02/23/24  0427 02/24/24  0603   WBC 7.3 5.3 6.3   HGB 10.8* 9.6* 10.1*   HCT 32.5* 30.1* 29.6*   .0* 103.1* 98.3   PLT  --  67*  --      Recent Labs     02/22/24  0436 02/23/24  0427 02/23/24  1748 02/24/24  0603    134 137 138   K 4.7 3.8 3.8 3.6    102 103 103   CO2 21* 17* 24 22   PHOS 0.9* 0.9*  --  1.5*   BUN 16 10 13 12   CREATININE 0.5* 0.4* 0.5* 0.5*   MG 2.0 1.1*  --  2.2     Recent Labs     02/24/24  0603   PROTIME 20.5*   INR 1.9     No results for input(s): \"CKTOTAL\", \"CKMB\", \"CKMBINDEX\", \"TROPONINI\" in the last 72 hours.  No results for input(s): \"BNP\" in the last 72 hours.  No results for input(s): \"CHOL\", \"HDL\", \"TRIG\" in the last 72 hours.    Invalid input(s): \"CHOLHDLR\", \"LDLCALCU\"  No results for input(s): \"TROPHS\" in the last 72 hours.        potassium phosphate 30 mmol in sodium chloride 0.9 % 500 mL IVPB, Once  potassium bicarb-citric acid (EFFER-K) effervescent tablet 20 mEq, Once  phosphorus (K PHOS NEUTRAL) 1 tablet, BID  0.9 % sodium chloride infusion, 
   PROGRESS NOTE     CARDIOLOGY    Chief complaint: Seen today for follow up, management & recommendations for cardiomyopathy    He was lying flat in bed.  He was sleeping comfortably.  He awakened easily.  He was comfortable and in no distress.  He denies any chest discomfort or shortness of breath.    Wt Readings from Last 3 Encounters:   02/18/24 82.3 kg (181 lb 6.4 oz)   01/16/24 72.6 kg (160 lb)   12/15/23 68 kg (150 lb)     Temp Readings from Last 3 Encounters:   02/25/24 97.3 °F (36.3 °C) (Temporal)   01/26/24 98.6 °F (37 °C) (Temporal)   01/16/24 98.1 °F (36.7 °C) (Temporal)     BP Readings from Last 3 Encounters:   02/25/24 109/65   01/26/24 129/61   01/16/24 130/66     Pulse Readings from Last 3 Encounters:   02/25/24 93   01/26/24 84   01/16/24 88         Intake/Output Summary (Last 24 hours) at 2/25/2024 1140  Last data filed at 2/25/2024 0747  Gross per 24 hour   Intake 420 ml   Output 650 ml   Net -230 ml       Recent Labs     02/23/24  0427 02/24/24  0603 02/25/24  0543   WBC 5.3 6.3 7.3   HGB 9.6* 10.1* 10.4*   HCT 30.1* 29.6* 30.9*   .1* 98.3 102.3*   PLT 67*  --   --      Recent Labs     02/23/24  0427 02/23/24  1748 02/24/24  0603 02/25/24  0543    137 138 135   K 3.8 3.8 3.6 4.1    103 103 104   CO2 17* 24 22 22   PHOS 0.9*  --  1.5* 1.4*   BUN 10 13 12 10   CREATININE 0.4* 0.5* 0.5* 0.5*   MG 1.1*  --  2.2 2.1     Recent Labs     02/24/24  0603   PROTIME 20.5*   INR 1.9     No results for input(s): \"CKTOTAL\", \"CKMB\", \"CKMBINDEX\", \"TROPONINI\" in the last 72 hours.  No results for input(s): \"BNP\" in the last 72 hours.  No results for input(s): \"CHOL\", \"HDL\", \"TRIG\" in the last 72 hours.    Invalid input(s): \"CHOLHDLR\", \"LDLCALCU\"  No results for input(s): \"TROPHS\" in the last 72 hours.        phosphorus (K PHOS NEUTRAL) 1 tablet, BID  acetaminophen (TYLENOL) tablet 650 mg, Q4H PRN  clopidogrel (PLAVIX) tablet 75 mg, Daily  aspirin EC tablet 81 mg, Daily  oxyCODONE-acetaminophen 
  [facility] Neurology follow up note      Shahzad Rodgers is a 68 y.o. right handed male     Neurology following for new onset seizure    PMH of h/o alcoholic liver cirrhosis, stage II esophageal varices, gastropathy, history of polypectomy, and  internal hemorrhoids     Patient presented to the ER on 2/14/2024 after experiencing a syncope and a fall at home.  Patient lives alone and fell and was unable to get up.  Family called EMS.  Patient was experiencing episodes as if he was going to pass out. There were also episodes of syncope as well as near syncope. He has a history of alcohol abuse with resultant liver cirrhosis and esophageal varices. Patient reports that he has been experiencing generalized fatigue and poor energy level.   He was positive for Hemoccult stool in the ER.  He was transfused with 1 unit of blood and given 1 L of fluid as well in the ER.    Patient has been serially falls prior to his admission falls and there was report of multiple areas of bruising to be related to prior falls.     CT head negative for acute abnormalities    On 2/15/2024 and RRT was called on the patient after falling in the bathroom.  Repeat head CT was negative    On 2/16/2024 patient experienced an onset of shaking per nursing staff and was found to be V-fib/V. tach arrest.  CODE BLUE was initiated and patient was shocked with ROSC after 5 minutes.  He was transferred to ICU for further evaluation and management.    Neurology was consulted for the jerking movements as there was a concern for seizure activity.  Patient had subsequent seizures in Keppra was started    Unable to obtain MRI due to inability to complete MRI questionnaire  EEG was normal    Patient sitting up in bed awake and alert.  He follows commands but is still confused for me.  RN at bedside notes no seizures.    Objective:     /80   Pulse 68   Temp (!) 48.2 °F (9 °C)   Resp 20   Ht 1.803 m (5' 11\")   Wt 82.3 kg (181 lb 6.4 oz)   SpO2 
4 Eyes Skin Assessment     NAME:  Shahzad Rodgers  YOB: 1955  MEDICAL RECORD NUMBER:  [id number]    The patient is being assessed for  Admission    I agree that at least one RN has performed a thorough Head to Toe Skin Assessment on the patient. ALL assessment sites listed below have been assessed.      Areas assessed by both nurses:    Head, Face, Ears, Shoulders, Back, Chest, Arms, Elbows, Hands, Sacrum. Buttock, Coccyx, Ischium, Legs. Feet and Heels, and Under Medical Devices         Does the Patient have a Wound? No noted wound(s)  Scattered scabs, some he picked off and made himself bleed, bandaids applied. Scattered ecchymosis on BUE, BLE, back, and neck  Blanchable redness on buttocks       Brian Prevention initiated by RN: No  Wound Care Orders initiated by RN: No    Pressure Injury (Stage 3,4, Unstageable, DTI, NWPT, and Complex wounds) if present, place Wound referral order by RN under : No    New Ostomies, if present place, Ostomy referral order under : No     Nurse 1 eSignature: Electronically signed by Saba Linares RN on 2/23/24 at 8:05 PM EST    **SHARE this note so that the co-signing nurse can place an eSignature**    Nurse 2 eSignature: Electronically signed by China Flores RN on 2/23/24 at 11:04 PM EST  
4 Eyes Skin Assessment     NAME:  Shahzad Rodgers  YOB: 1955  MEDICAL RECORD NUMBER:  [id number]    The patient is being assessed for  Transfer to New Unit    I agree that at least one RN has performed a thorough Head to Toe Skin Assessment on the patient. ALL assessment sites listed below have been assessed.      Areas assessed by both nurses:    Head, Face, Ears, Shoulders, Back, Chest, Arms, Elbows, Hands, and Sacrum. Buttock, Coccyx, Ischium        Does the Patient have a Wound? No noted wound(s) No noted wounds, abrasion on left upper extremity with bruising on all extremities       Brian Prevention initiated by RN: Yes  Wound Care Orders initiated by RN: No    Pressure Injury (Stage 3,4, Unstageable, DTI, NWPT, and Complex wounds) if present, place Wound referral order by RN under : No    New Ostomies, if present place, Ostomy referral order under : No     Nurse 1 eSignature: Electronically signed by Wei De La Torre RN on 2/20/24 at 6:56 PM EST    **SHARE this note so that the co-signing nurse can place an eSignature**    Nurse 2 eSignature: {Esignature:[id number]}    
4 Eyes Skin Assessment     NAME:  Shahzad Rodgers  YOB: 1955  MEDICAL RECORD NUMBER:  [id number]    The patient is being assessed for  Admission    I agree that at least one RN has performed a thorough Head to Toe Skin Assessment on the patient. ALL assessment sites listed below have been assessed.      Areas assessed by both nurses:    Head, Face, Ears, Shoulders, Back, Chest, Arms, Elbows, Hands, Sacrum. Buttock, Coccyx, Ischium, Legs. Feet and Heels, and Under Medical Devices         Does the Patient have a Wound? Yes wound(s) were present on assessment. LDA wound assessment was Initiated and completed by RN  Abrasions BUE, BLE  Bruising BUE, BLE, Back, shoulders, Left eye  Redness-blanchable- Buttocks  Dry flaky heels       Brian Prevention initiated by RN: Yes  Wound Care Orders initiated by RN: No    Pressure Injury (Stage 3,4, Unstageable, DTI, NWPT, and Complex wounds) if present, place Wound referral order by RN under : No    New Ostomies, if present place, Ostomy referral order under : No     Nurse 1 eSignature: Electronically signed by Fernanda Gutierrez RN on 2/14/24 at 6:44 PM EST    **SHARE this note so that the co-signing nurse can place an eSignature**    Nurse 2 eSignature: Electronically signed by Cherelle English RN on 2/15/24 at 6:45 AM EST    
After calling mx times to speak to accepting nurse at nursing home, I was finally able to leave message with  to inform nurse of life vest. And if nurse had any questions she may call this unit.   
Call placed to lab for stat blood cultures  
Comprehensive Nutrition Assessment    Type and Reason for Visit:  Initial, Consult (Poor Intake)    Nutrition Recommendations/Plan:   Continue NPO, ADAT once med appropriate.    Will Start ONS once diet adv and monitor.      Malnutrition Assessment:  Malnutrition Status:  Insufficient data (02/23/24 1420)    Context:  Acute Illness     Findings of the 6 clinical characteristics of malnutrition:  Energy Intake:  Mild decrease in energy intake (Comment) (sporadic intake at times)  Weight Loss:  Unable to assess (2/2 fluid shifts)     Body Fat Loss:  Unable to assess (2/2 CONNER for Cath Lab at this time)     Muscle Mass Loss:  Unable to assess    Fluid Accumulation:  Unable to assess (ascites r/t ALC noted only at this time)     Strength:  Not Performed    Nutrition Assessment:    Pt adm w/ gen weakness/frequent falls x ~1wk pta, recently quit drinking ETOH x ~1month pta, +syncope/collapse at PCP office just pta.  PMHx ETOHA, ALC w/ re-current ascites s/p multiple paracentesis (last on 1/26/24, -1175ml), CHF, VHD, Sz, ICM, Eso. varices, hx prostate CA s/p prostatectomy/XRT (06'), gastropathy.  Adm w/ Syncope & collapse, small Lt Pleural Effusion, hematuria.  However noted RRT for GLF/+headstrike 2/15, then Code Blue/V-tach arrest s/p shock/intubation/ICU Tx 2/16.  Noted UGIB, New cardiomyopathy, NSTEMI, AHRF, Septic shock, acute met enceph, hypokalemia/magnesemia.  Now s/p extubation 2/18, passed bedside swallow eval 2/20.  Noted plan for LHC and paracentesis today.  At risk d/t decreased umang/intake at times as well as previous NPO x ~4d 2/2 previous intubation/NPO status.  Will Re-Start ONS once diet advanced and monitor.    Nutrition Related Findings:    AMSX2, dentition WNL, distended/non-tender Abd, Hypo BS, +ascites, no edema, +I/O's, elevated LFT's/Bili 4.1, hypo K+/Mg+ Wound Type: None       Current Nutrition Intake & Therapies:    Average Meal Intake: NPO (~75% avg intake prior to NPO)  Average Supplements 
EEG completed. Report to follow.  Oneida Agustin 2/19/2024     
Faxed AVS to NH  
I have discussed with the brother the rationale for blood component transfusion; its benefits in treating or preventing fatigue, organ damage, or death; and its risk which includes mild transfusion reactions, rare risk of blood borne infection, or more serious but rare reactions. I have discussed the alternatives to transfusion, including the risk and consequences of not receiving transfusion. The brother had an opportunity to ask questions and had agreed to proceed with transfusion of blood components.    Electronically signed by HEIDE Haque CNP on 2/16/2024 at 8:04 PM    
Informed nurse Payne at NH to ensure that battery is charged and exchanged daily.   
Intubation Record    Date: 02/16/2024   Time: 0759  Patient identity confirmed: Yes  Indications: Code Blue   Preoxygenation: BVM with 100% O2   Laryngoscope size and type: glidescope  Airway introducer used: No  Evac: Yes  Tube size: 8.0  Number of attempts :1  Cords visualized:  [x]Clearly [] Poorly   Breath sounds present bilaterally: Yes  ETCO2 49  ETT to lip: 25  Tube secured with: commercial device  Chest x-ray ordered: Yes  Difficulties encountered:       Difficult Airway: No          Gifty kerr RCP, RRT  
Leak test performed. Patient has audible cuff leak. Cuff re-inflated after test was performed.  
MILLIE Stratton notified of urology consult. Pt added to census.  
MRI screening needs completed, thank you.  
Macdoanld catheter irrigated with 100 ml sterile saline, no clots noted. Macdonald draining clear barb urine.  
Macdonald catheter irrigated as per order, no blood clots noted , urine barb in color,  
Made María Elena Shaffer aware of Lactic acid 4.5, will continue to monitor.   
[facility]YSICIANS- [facility]iology  Inpatient Progress Report  PATIENT: Shahzad Rodgers  MEDICAL RECORD NUMBER: [id number]  DATE OF SERVICE:  2/19/2024  ATTENDING ELECTROPHYSIOLOGIST: Yessica Ross MD   PRIMARY ELECTROPHYSIOLOGIST: Yessica Ross MD  REFERRING PHYSICIAN:  Fly Sequeira MD  CHIEF COMPLAINT: Generalized fatigue    HPI: Shahzad Rodgers is a 68 y.o. male with a history of EtOH cirrhosis, ascites, grade 2 esophageal varices, GI bleed 2/2 portal hypertensive gastropathy sp bipolar cautery (1/16/2024), colon polyps sp removal (1/16/2024), internal hemorrhoids, gynecomastia, and prostate CA.  He is managed by PCP with furosemide 40 mg twice daily, nadolol 20 mg daily, and PPI.  On 2/15/2024, patient presented with chief complaint of fatigue, confusion, and hypotension.  He was diagnosed with closed head injury related to multiple falls.  In the ED, patient attempted to use the toilet by himself then had LOC with head trauma.  CT of the head without intracranial hemorrhage.  TTE reported LVEF of 50-55%.  On 2/16/2024, patient had polymorphic VT, which was treated with epinephrine, magnesium, intubation/sedation, CPR x 4 minutes, and external defibrillation.  ROSC was achieved.          After the arrest, ECG with QTc of 623 msec (increased compared to 2/14/2024: 484 ms) and TTE reported LVEF reduced to 20-25% with inferior and inferolateral akinesis and moderate secondary MR.          Cardiology service was consulted, who diagnosed NSTEMI and recommended dopamine and potential ischemia eval in the future, but due to his anemia/thrombocytopenia he was not a candidate for coronary angiography at that time.  EP service was consulted, who noted K+ = 3.1 and Mg++ = 1.4 and recommended electrolyte repletion, TTE, and potential ischemia eval after recovery (GI bleed).  Additionally, patient was noted to have brief episodes of SVT on telemetry.  After this 
Notified Dr Menezes of Dr Ziegler recommendations not to stop asa or plavix.   
Nurse to Nurse report called to [phone].  
Nurse to nurse given to Rhoda.  
Nutrition Note    Type and Reason for Visit: RD Nutrition Re-Screen/LOS (RD ReScreen Negative)    Nutrition Assessment:  Pt re-screened per LOS protocol.  Chart reviewed.  Pt currently eating ~75% of most meals and w/ no other significant nutritional issues noted at this time.  Will follow-up per policy.  Please consult if RD needed.    Harrison Macedo RD, LD  Contact: ext [phone]    
OCCUPATIONAL THERAPY INITIAL EVALUATION    [facility]  [address], [place]      Date:2024                                                  Patient Name: Shahzad Rodgers  MRN: [id number]  : 1955  Room: [address] [place] [address]    Evaluating OT: MIROSLAVA Dumont, OTR/L  # [id number]    Referring Provider:  Nessa Caldwell MD   Specific Provider Orders:  \"OT Eval and Treat\"  2-15-24    Diagnosis: Syncope and collapse [R55]  Upper GI bleeding [K92.2]    Pt was admitted  24 w/ Fatigue, Confusion, Hypotension, Anemia  2-15-24:  RRT for Seizure, Subsequent Fall  24:  Code Blue - VFib Arrest - Intubated and Transferred to ICU  24:  Extubated  24:  Worsening Confusion, experiencing Visual hallucinations - Placed in Restraints, transferred to PICU  24:  weaned from Restraints -  at b/s    Pertinent Medical History:  Pt has a past medical history of Alcoholism (HCC) and Prostate cancer (HCC).,  has a past surgical history that includes Prostatectomy (); Paracentesis (Right, 2023); Paracentesis (Right, 10/06/2023); Upper gastrointestinal endoscopy (N/A, 2024); Colonoscopy (N/A, 2024); and Paracentesis (Right, 2024).    Surgeries this admission: None     Precautions:  Fall Risk  Cognition - Alarms,   Macdonald Catheter  O2 via NC    Assessment of current deficits   [x] Functional mobility  [x]ADLs  [x] Strength               [x]Cognition   [x] Functional transfers   [x] IADLs         [x] Safety Awareness   [x]Endurance   [] Fine Coordination              [x] Balance     [] Vision/perception   []Sensation    []Gross Motor Coordination  [] ROM  [] Delirium                  [] Motor Control       OT PLAN OF CARE   OT POC based on physician orders, patient diagnosis and results of clinical assessment    Frequency/Duration 1-3 days/wk for 2 weeks PRN   Specific OT 
OT SESSION ATTEMPT     Date:2024  Patient Name: Shahzad Rodgers  MRN: [id number]  : 1955  Room: [address] [place] [address]-A     Occupational therapy orders received/chart review completed and OT session attempted this date:   Code blue this AM Pt transferred to [facility].       Will reattempt OT at a later time/date.    Vijay Su OTR/L [id number]    
Patient extubated to 4L NC. RN at bedside.   
Patient placed on PSV at this time. PS 8 40% peep 8   
Patient trialed out of restraints from 0069-2201. Patient now out of restraints as of 1515. Order DC.  Electronically signed by Wei De La Torre RN on 2/20/2024 at 7:04 PM    
Pharmacy Consultation Note  (Antibiotic Dosing and Monitoring)    Initial consult date: 2-16-24  Consulting physician/provider: Dr JOHN Turcios  Drug: Vancomycin  Indication: CAP    Age/  Gender Height Weight IBW  Allergy Information   68 y.o./male 180.3 cm (5' 11\") 72.6 kg (160 lb)     Ideal body weight: 75.3 kg (166 lb 0.1 oz)   Penicillins      Renal Function:  Recent Labs     02/16/24  0800 02/16/24  0815 02/16/24  1750   BUN DUPLICATE ORDER 17 23   CREATININE DUPLICATE ORDER 0.8 1.0       Intake/Output Summary (Last 24 hours) at 2/16/2024 1858  Last data filed at 2/16/2024 1800  Gross per 24 hour   Intake 533.88 ml   Output 465 ml   Net 68.88 ml       Vancomycin Monitoring:  Trough:  No results for input(s): \"VANCOTROUGH\" in the last 72 hours.  Random:  No results for input(s): \"VANCORANDOM\" in the last 72 hours.    Vancomycin Administration Times:  Recent vancomycin administrations        No vancomycin IV orders with administrations found.                    Assessment:  Patient is a 68 y.o. male who has been initiated on vancomycin  Estimated Creatinine Clearance: 75 mL/min (based on SCr of 1 mg/dL).      Plan:  Will continue vancomycin 1750 mg IV once followed by vancomycin 1000 mg IV every 12 hours  Will check vancomycin levels when appropriate  Will continue to monitor renal function   Pharmacy to follow      Jennifer Hurt RPH 2/16/2024 6:58 PM    ALMA ROSA: [phone]  SEY: [phone]  SJW: [phone]    
Pharmacy Consultation Note  (Antibiotic Dosing and Monitoring)    Initial consult date: 2-16-24  Consulting physician/provider: Dr JOHN Turcios  Drug: Vancomycin  Indication: CAP    Age/  Gender Height Weight IBW  Allergy Information   68 y.o./male 180.3 cm (5' 11\") 72.6 kg (160 lb)     Ideal body weight: 75.3 kg (166 lb 0.1 oz)   Penicillins      Renal Function:  Recent Labs     02/16/24  0815 02/16/24  1750 02/17/24  0437   BUN 17 23 22   CREATININE 0.8 1.0 0.8         Intake/Output Summary (Last 24 hours) at 2/17/2024 1247  Last data filed at 2/17/2024 1200  Gross per 24 hour   Intake 3069.82 ml   Output 1185 ml   Net 1884.82 ml         Vancomycin Monitoring:  Trough:  No results for input(s): \"VANCOTROUGH\" in the last 72 hours.  Random:  No results for input(s): \"VANCORANDOM\" in the last 72 hours.    Vancomycin Administration Times:  Recent vancomycin administrations                     vancomycin (VANCOCIN) 1,000 mg in sodium chloride 0.9 % 250 mL IVPB ([id number]) (mg) 1,000 mg New Bag 02/17/24 0937    vancomycin (VANCOCIN) 1,750 mg in sodium chloride 0.9 % 500 mL IVPB (mg) 1,750 mg New Bag 02/16/24 2311                   Assessment:  Patient is a 68 y.o. male who has been initiated on vancomycin  Estimated Creatinine Clearance: 93 mL/min (based on SCr of 0.8 mg/dL).   2/17: Scr 0.8. UOP 0.6 mL/kg/hr. Blood and respiratory cultures pending.       Plan:  Continue vancomycin 1000 mg q12H  Will check a vancomycin level tomorrow AM   Will continue to monitor renal function   Pharmacy to follow      Isac Carrera, PharmD, [facility]  PGY-1 Pharmacy Resident  [phone]   2/17/2024 12:47 PM    ALMA ROSA: [phone]  SEY: [phone]  SJW: [phone]    
Pharmacy Consultation Note  (Antibiotic Dosing and Monitoring)    Initial consult date: 2-16-24  Consulting physician/provider: Dr JOHN Turcios  Drug: Vancomycin  Indication: CAP    Age/  Gender Height Weight IBW  Allergy Information   68 y.o./male 180.3 cm (5' 11\") 72.6 kg (160 lb)     Ideal body weight: 75.3 kg (166 lb 0.1 oz)  Adjusted ideal body weight: 78.1 kg (172 lb 2.6 oz)   Penicillins      Renal Function:  Recent Labs     02/17/24  0437 02/17/24  1558 02/18/24  0420   BUN 22 19 17   CREATININE 0.8 0.6* 0.5*         Intake/Output Summary (Last 24 hours) at 2/18/2024 1247  Last data filed at 2/18/2024 1100  Gross per 24 hour   Intake 4652 ml   Output 2675 ml   Net 1977 ml         Vancomycin Monitoring:  Trough:    Recent Labs     02/18/24  0744   VANCOTROUGH 9.2     Random:  No results for input(s): \"VANCORANDOM\" in the last 72 hours.    Vancomycin Administration Times:  Recent vancomycin administrations                     vancomycin (VANCOCIN) 1,000 mg in sodium chloride 0.9 % 250 mL IVPB ([id number]) (mg) 1,000 mg New Bag 02/18/24 0859     1,000 mg New Bag 02/17/24 2050     1,000 mg New Bag  0937    vancomycin (VANCOCIN) 1,750 mg in sodium chloride 0.9 % 500 mL IVPB (mg) 1,750 mg New Bag 02/16/24 2311                   Assessment:  Patient is a 68 y.o. male who has been initiated on vancomycin  Estimated Creatinine Clearance: 155 mL/min (A) (based on SCr of 0.5 mg/dL (L)).   2/17: Scr 0.8. UOP 0.6 mL/kg/hr. Blood and respiratory cultures pending.   2/18: Scr 0.5. UOP 1 mL/kg/hr. Blood cultures pending.       Plan:  Change to vancomycin 1250 mg q12H   Will check a vancomycin level tomorrow AM   Will continue to monitor renal function   Pharmacy to follow      Isac Carrera, Ana Luisa, [facility]  PGY-1 Pharmacy Resident  [phone]   2/18/2024 12:47 PM    ALMA ROSA: [phone]  SEY: [phone]  [facility]: [phone]    
Pharmacy Consultation Note  (Antibiotic Dosing and Monitoring)    Initial consult date: 2-16-24  Consulting physician/provider: Dr JOHN Turcios  Drug: Vancomycin  Indication: CAP    Vancomycin has been discontinued. Clinical pharmacy will sign off, please reconsult if further assistance is needed.     Anna Headley, PharmD, BCPS, BCCCP 2/19/2024 9:21 AM     
Physical Therapy    Patient received and chart reviewed for PT alessandra. RN informed of code blue this AM, pt transferred to 4403.     Will follow up as appropriate.     Henny Escobar, PT, DPT  [id number]      
Physical Therapy  Initial Assessment     Name: Shahzad Rodgers  : 1955  MRN: [id number]      Date of Service: 2024    Evaluating PT: Nile Boyle, PT, DPT [id number]      Room #:  4502/4502-A  Diagnosis:  Syncope and collapse [R55]  Upper GI bleeding [K92.2]  PMHx/PSHx:   has a past medical history of Alcoholism (HCC) and Prostate cancer (HCC).  Pertinent Information:  Intubated , Extubated   Procedure/Surgery:  EEG   Precautions:  Fall risk, O2, Macdonald,     SUBJECTIVE:    Pt lives alone in a 2 story duplex apartment with 1 stair(s) and no rail(s) to enter. Full flight of stairs and 1 rail to second floor bed and bath. Pt ambulated with SPC prior to admission.    OBJECTIVE:   Initial Evaluation  Date: 24 Treatment Date: Short Term/ Long Term   Goals   AM-PAC 6 Clicks      Was pt agreeable to Eval/treatment? Yes     Does pt have pain? 5/10 chest pain     Bed Mobility  Rolling: NT  Supine to sit: SBA  Sit to supine: Sofia  Scooting: SBA to EOB  Rolling: Independent   Supine to sit: Independent   Sit to supine: Independent   Scooting: Independent    Transfers Sit to stand: ModA  Stand to sit: Sofia  Stand pivot: NT  Sit to stand: Supervision  Stand to sit: Supervision  Stand pivot: Supervision with WW   Ambulation   Unable due to dizziness when standing  >100 feet with WW with Supervision   Stair negotiation: ascended and descended NT  >4 step(s) with 1 rail(s) with SBA   ROM BUE: Refer to OT note  BLE: WFL     Strength BUE: Refer to OT note  BLE: NT     Balance Sitting EOB: SBA  Dynamic Standing: Sofia with WW  Sitting EOB: Independent   Dynamic Standing: Supervision with WW     Pt is A & O x: 2 to person and place. Pt thought it was /2021.  Sensation: Pt denies numbness and tingling of extremities.   Edema: Unremarkable    Patient education  Pt educated on PT role in acute care setting.    Patient response to education:   Pt verbalized understanding Pt 
Physical Therapy  Treatment     Name: Shahzad Rodgers  : 1955  MRN: [id number]      Date of Service: 2024    Evaluating PT: Nile Boyle, PT, DPT [id number]      Room #:  6326/6326-A  Diagnosis:  Syncope and collapse [R55]  Upper GI bleeding [K92.2]  PMHx/PSHx:   has a past medical history of Alcoholism (HCC) and Prostate cancer (HCC).  Pertinent Information:  Intubated , Extubated , cardiac cath   Procedure/Surgery:  EEG   Precautions:  Fall risk, O2, Macdonald, , + Alarms    SUBJECTIVE:    Pt lives alone in a 2 story duplex apartment with 1 stair(s) and no rail(s) to enter. Full flight of stairs and 1 rail to second floor bed and bath. Pt ambulated with SPC prior to admission.    OBJECTIVE:   Initial Evaluation  Date: 24 Treatment Date: 24 Short Term/ Long Term   Goals   AM-PAC 6 Clicks     Was pt agreeable to Eval/treatment? Yes yes    Does pt have pain? 5/10 chest pain No c/o pain    Bed Mobility  Rolling: NT  Supine to sit: SBA  Sit to supine: Sofia  Scooting: SBA to EOB Rolling:ModA  Supine to sit: ModA  Sit to supine: NT  Scooting: Sofia to EOB Rolling: Independent   Supine to sit: Independent   Sit to supine: Independent   Scooting: Independent    Transfers Sit to stand: ModA  Stand to sit: Sofia  Stand pivot: NT Sit to stand: MaxAx2  Stand to sit: MaxAx2  Stand pivot: MaxAx2 WW Sit to stand: Supervision  Stand to sit: Supervision  Stand pivot: Supervision with WW   Ambulation   Unable due to dizziness when standing A few steps to bedside chair with WW MaxAx2 >100 feet with WW with Supervision   Stair negotiation: ascended and descended NT NT >4 step(s) with 1 rail(s) with SBA   ROM BUE: Refer to OT note  BLE: WFL     Strength BUE: Refer to OT note  BLE: NT     Balance Sitting EOB: SBA  Dynamic Standing: Sofia with WW Sitting EOB: SBA  Dynamic Standing: MaxAx2 with WW Sitting EOB: Independent   Dynamic Standing: Supervision with WW     Pt is A & O x: 
Physical Therapy  Treatment     Name: Shahzad Rodgers  : 1955  MRN: [id number]      Date of Service: 2024    Evaluating PT: Nile Boyle, PT, DPT [id number]      Room #:  6326/6326-A  Diagnosis:  Syncope and collapse [R55]  Upper GI bleeding [K92.2]  PMHx/PSHx:   has a past medical history of Alcoholism (HCC) and Prostate cancer (HCC).  Pertinent Information:  Intubated , Extubated , cardiac cath   Procedure/Surgery:  EEG   Precautions:  Fall risk, O2, Macdonald, , + Alarms    SUBJECTIVE:    Pt lives alone in a 2 story duplex apartment with 1 stair(s) and no rail(s) to enter. Full flight of stairs and 1 rail to second floor bed and bath. Pt ambulated with SPC prior to admission.    OBJECTIVE:   Initial Evaluation  Date: 24 Treatment Date: 24 Short Term/ Long Term   Goals   AM-PAC 6 Clicks 12/24 10/24    Was pt agreeable to Eval/treatment? Yes yes    Does pt have pain? 5/10 chest pain No c/o pain    Bed Mobility  Rolling: NT  Supine to sit: SBA  Sit to supine: Sofia  Scooting: SBA to EOB Rolling:ModA  Supine to sit: NT  Sit to supine: MaxA  Scooting: NT Rolling: Independent   Supine to sit: Independent   Sit to supine: Independent   Scooting: Independent    Transfers Sit to stand: ModA  Stand to sit: Sofia  Stand pivot: NT Sit to stand: MaxA  Stand to sit: MaxA  Stand pivot: ModAx2 WW Sit to stand: Supervision  Stand to sit: Supervision  Stand pivot: Supervision with WW   Ambulation   Unable due to dizziness when standing A few steps to bedside chair with WW ModAx2 >100 feet with WW with Supervision   Stair negotiation: ascended and descended NT NT >4 step(s) with 1 rail(s) with SBA   ROM BUE: Refer to OT note  BLE: WFL     Strength BUE: Refer to OT note  BLE: NT     Balance Sitting EOB: SBA  Dynamic Standing: Sofia with WW Sitting EOB: SBA  Dynamic Standing: ModAx2 with WW Sitting EOB: Independent   Dynamic Standing: Supervision with WW     Pt is A & O x: 3  Sensation: Pt denies 
Pierce catheter irrigated no blood clots removed at this time urine barb in color , tolerated well , pierce catheter draining urine barb in color no blood clots noted , will continue to monitor .  
[facility] PHYSICIANS- The Heart [facility] Electrophysiology  Inpatient Progress Report  PATIENT: Shahzad Rodgers  MEDICAL RECORD NUMBER: [id number]  DATE OF SERVICE:  2/25/2024  ATTENDING ELECTROPHYSIOLOGIST: Yessica Ross MD   PRIMARY ELECTROPHYSIOLOGIST: Yessica Ross MD  REFERRING PHYSICIAN:  Fly Sequeira MD  CHIEF COMPLAINT: Generalized fatigue    HPI: Shahzad Rodgers is a 68 y.o. male with a history of EtOH cirrhosis, ascites, grade 2 esophageal varices, GI bleed 2/2 portal hypertensive gastropathy sp bipolar cautery (1/16/2024), colon polyps sp removal (1/16/2024), internal hemorrhoids, gynecomastia, and prostate CA.  He is managed by PCP with furosemide 40 mg twice daily, nadolol 20 mg daily, and PPI.  On 2/15/2024, patient presented with chief complaint of fatigue, confusion, and hypotension.  He was diagnosed with closed head injury related to multiple falls.  In the ED, patient attempted to use the toilet by himself then had LOC with head trauma.  CT of the head without intracranial hemorrhage.  TTE reported LVEF of 50-55%.  On 2/16/2024, patient had polymorphic VT, which was treated with epinephrine, magnesium, intubation/sedation, CPR x 4 minutes, and external defibrillation.  ROSC was achieved.          After the arrest, ECG with QTc of 623 msec (increased compared to 2/14/2024: 484 ms) and TTE reported LVEF reduced to 20-25% with inferior and inferolateral akinesis and moderate secondary MR.          Cardiology service was consulted, who diagnosed NSTEMI and recommended dopamine and potential ischemia eval in the future, but due to his anemia/thrombocytopenia he was not a candidate for coronary angiography at that time.  EP service was consulted, who noted K+ = 3.1 and Mg++ = 1.4 and recommended electrolyte repletion, TTE, and potential ischemia eval after recovery (GI bleed).  Additionally, patient was noted to have brief episodes of SVT on telemetry.  After this 
Pt arrived post code to 4403 at 08:12  
Pt continues to reach for tubes and lines when unrestrained. Restraints cont'd.  
Pt has gross hematuria- [facility] urology re consulted as pt traumatically pulled pierce yesterday and urine is bloody.  
Pt in cath lab. Will wait until Monday morning for para. If procedure still needed Monday, please make NPO Sunday night and keep thinner free. Please draw new labs including INR Monday morning.   
Pt passes bedside swallow eval.  
Pts Phosphorous level is 0.9 this am. Dr. Em notified via [employer]    Electronically signed by Jagruti Leal RN on 2/23/2024 at 7:57 AM    
RN called pharmacy to check compatibility for Albumin and potassium phosphate, and they are not compatible so the albumin will need to run after the potassium phosphate is complete.  
RN irrigated pierce catheter with 100 mL of sterile saline. Clots removed, urine draining clear with no clots at this time.  
RN messaged Dr. Garcia about patient's magnesium level of 1.8. Orders placed for replacement.  
RN messaged Dr. Hernandez regarding if GI signed off. He said he called in another diuretic prescription and is otherwise discharged from cardiology standpoint.  
RN messaged Dr. Menezes and Ifrah Ovalle NP to clarify if patient is discharged from GI services.   
RN messaged Dr. Oglesby and Dr. Garcia to update on the life vest situation and that the facility will not take the patient with the life vest with his current mentation.  
RN messaged Wilda Butler to see if cardiology has any other plans for the patient, he is discharged from a cardiology standpoint.  
RN nirav Em for clarification of IV fluids.  
RT Nebulizer Bronchodilator Protocol Note    There is a bronchodilator order in the chart from a provider indicating to follow the RT Bronchodilator Protocol and there is an “Initiate RT Bronchodilator Protocol” order as well (see protocol at bottom of note).    CXR Findings:  No results found.    The findings from the last RT Protocol Assessment were as follows:  Smoking: (P) Smoker 15 pack years or more  Respiratory Pattern: (P) Regular pattern and RR 12-20 bpm  Breath Sounds: (P) Slightly diminished and/or crackles  Cough: (P) Strong, spontaneous, non-productive  Indication for Bronchodilator Therapy: (P) None  Bronchodilator Assessment Score: (P) 3    Aerosolized bronchodilator medication orders have been revised according to the RT Nebulizer Bronchodilator Protocol below.    Respiratory Therapist to perform RT Therapy Protocol Assessment initially then follow the protocol.  Repeat RT Therapy Protocol Assessment PRN for score 0-3 or on second treatment, BID, and PRN for scores above 3.    No Indications - adjust the frequency to every 6 hours PRN wheezing or bronchospasm, if no treatments needed after 48 hours then discontinue using Per Protocol order mode.     If indication present, adjust the RT bronchodilator orders based on the Bronchodilator Assessment Score as indicated below.  If a patient is on this medication at home then do not decrease Frequency below that used at home.    0-3 - enter or revise RT bronchodilator order(s) to equivalent RT Bronchodilator order with Frequency of every 4 hours PRN for wheezing or increased work of breathing using Per Protocol order mode.       4-6 - enter or revise RT Bronchodilator order(s) to two equivalent RT bronchodilator orders with one order with BID Frequency and one order with Frequency of every 4 hours PRN wheezing or increased work of breathing using Per Protocol order mode.         7-10 - enter or revise RT Bronchodilator order(s) to two equivalent RT 
Renal Dose Adjustment Policy (Generic)     This patient is on medication that requires renal, weight, and/or indication dose adjustment.      Date Body Weight IBW  Adjusted BW SCr  CrCl Dialysis status   2/16/2024 72.6 kg (160 lb) Ideal body weight: 75.3 kg (166 lb 0.1 oz) Serum creatinine: 0.8 mg/dL 02/16/24 0815  Estimated creatinine clearance: 93 mL/min N/a       Pharmacy has dose-adjusted the following medication(s):    Date Previous Order Adjusted Order   2/16/2024 Azactam 1 gram q8h Azactam 2 gram q8h       These changes were made per protocol according to the [facility]   Automatic Renal Dose Adjustment Policy.     *Please note this dose may need readjusted if patient's condition changes.    Please contact pharmacy with any questions regarding these changes.    Gunjan Lopez RPH  2/16/2024  6:28 PM    
Report called to floor  
[facility]   Gastroenterology, Hepatology, &  Advanced Endoscopy      Reason for Consult: Elevated LFT's in the setting of EtOH liver disease.     ASSESSMENT AND PLAN: Discussed paracentesis fluid analysis findings with patient -SBP, + SAAG    Pt seen in office 2/14/24 by Snehal Mercado NP with Dr. Hernandez for follow up of a EGD for variceal bleeding.  Patient had missed 2 weeks of paracentesis. Patient was very weak and with notable SOB, BP in office was 80/60. He was sent to the ED for further work up and evaluation.     Elevated LFT's in a EtOH pattern  Negative hepatic work up serology 9/14/23  -SPA 25 mg IV TID, Hold once  Albumin >3.5  -Supportive care  - Monitor LFT trend  -Lasix currently held  -Paracentesis with fluids for analysis. A total volume of  6100mL was withdrawn.  - SBP  -  +SAAG 2.1  -Continue with standing order of paracentesis q 2 weeks    Anemia  - Continue Plavix  - Monitor CBC  -Monitor gross bleed  -Transfuse to keep Hgb >7.0, currently 9.6  - Protonix 40 mg    Polymorphic VT   - followed by Electrophysiology   - On 2/22/24, he was diagnosed with significant multivessel CAD (OM-1, LAD, D1). OM-1 stent complicated by Lcx dissection which was then stented.   - EP recommended LAD/D1 intervention in the setting of PMVT and HFrEF. This will need to be arranged prior to discharge.  -START metoprolol XL 25 mg daily.    Respiratory failure   - Extubated 2/18/2024.    Seizures   - EEG normal.  - Management per neurology.       EGD demonstrated 1/16/24  - Grade II esophageal varices with no associated high risk features.   -Z-line regular. Portal hypertensive gastropathy with focal areas of low-grade active hemorrhage.   -Treated with bipolar cautery.   -Normal antrum. Normal examined duodenum. No specimens collected.      C-Scope 1/16/24  -The examined portion of the ileum was normal.   -Congestive colopathy in the entire examined colon.   -Tortuous sigmoid.   -Three polyps removed with a cold snare 
Sitting up in chair. BP this am 68/40 right arm and 76/40 left arm. Only complaint is being tired. Lied back into bed. Repeat BP 86/42. AIDA Butler NP was notified.   
Spoke with RN regarding patient's ordered paracentesis.  Please keep patient NPO and hold all thinners for procedure. Patient is not able to sign consent. Will contact brother for consent.   
Spontaneous Parameters performed    VT = 501 ml  f = 15  B/M  Ve = 9.84 L/M  NIF = -26  cmH2O  VC = 1.1 L  RSBI = 45    Parameters were obtained on PSV 5       Performed by Romina Emmanuel RCP   
Spontaneous parameters completed. Patient weaned on SBT for approximately an hour. Patient placed back on AC awaiting further instruction.   
The patient sustained an unwitnessed fall in the bathroom. The patient's brother Vicente was called and updated of the incident. I also explained that the patient was going down to have a cat scan of the head, post fall. I provided him the nurses station number and asked if he had any further questions. He will call and check on his brother in a few hours.   
Took the patients belongs to his new room. Two bags of clothing and put them in the bottom of dresser.  
[facility] PHYSICIANS- [facility] Electrophysiology  Inpatient Progress Report  PATIENT: Shahzad Rodgers  MEDICAL RECORD NUMBER: [id number]  DATE OF SERVICE:  2/20/2024  ATTENDING ELECTROPHYSIOLOGIST: Yessica Ross MD   PRIMARY ELECTROPHYSIOLOGIST: Yessica Ross MD  REFERRING PHYSICIAN:  Fly Sequeira MD  CHIEF COMPLAINT: Generalized fatigue    HPI: Shahzad Rodgers is a 68 y.o. male with a history of EtOH cirrhosis, ascites, grade 2 esophageal varices, GI bleed 2/2 portal hypertensive gastropathy sp bipolar cautery (1/16/2024), colon polyps sp removal (1/16/2024), internal hemorrhoids, gynecomastia, and prostate CA.  He is managed by PCP with furosemide 40 mg twice daily, nadolol 20 mg daily, and PPI.  On 2/15/2024, patient presented with chief complaint of fatigue, confusion, and hypotension.  He was diagnosed with closed head injury related to multiple falls.  In the ED, patient attempted to use the toilet by himself then had LOC with head trauma.  CT of the head without intracranial hemorrhage.  TTE reported LVEF of 50-55%.  On 2/16/2024, patient had polymorphic VT, which was treated with epinephrine, magnesium, intubation/sedation, CPR x 4 minutes, and external defibrillation.  ROSC was achieved.          After the arrest, ECG with QTc of 623 msec (increased compared to 2/14/2024: 484 ms) and TTE reported LVEF reduced to 20-25% with inferior and inferolateral akinesis and moderate secondary MR.          Cardiology service was consulted, who diagnosed NSTEMI and recommended dopamine and potential ischemia eval in the future, but due to his anemia/thrombocytopenia he was not a candidate for coronary angiography at that time.  EP service was consulted, who noted K+ = 3.1 and Mg++ = 1.4 and recommended electrolyte repletion, TTE, and potential ischemia eval after recovery (GI bleed).  Additionally, patient was noted to have brief episodes of SVT on telemetry.  After this 
Unable to complete MRI screening form. Patient is confused, patients close friend is uncertain, and unable to contact family member at this time.  
0826  Last data filed at 2/22/2024 0635  Gross per 24 hour   Intake 120 ml   Output 1300 ml   Net -1180 ml         OBJECTIVE:    /68   Pulse 72   Temp 97.6 °F (36.4 °C) (Temporal)   Resp 20   Ht 1.803 m (5' 11\")   Wt 82.3 kg (181 lb 6.4 oz)   SpO2 97%   BMI 25.30 kg/m²     General appearance: No apparent distress, appears stated age and cooperative.  HEENT:  Conjunctivae/corneas clear.   Neck: Supple. No jugular venous distention.   Respiratory: Clear to auscultation bilaterally, normal respiratory effort  Cardiovascular: Regular rate rhythm, normal S1-S2  Abdomen: Soft, nontender, mildly distended  Musculoskeletal: No clubbing, cyanosis, no bilateral lower extremity edema. Brisk capillary refill.   Skin:  No rashes  on visible skin  Neurologic: awake, alert and following commands     ASSESSMENT:  Polymorphic VT/VF arrest  Hypokalemia  Hypomagnesemia  New cardiomyopathy EF 35 to 40%  Acute respiratory failure requiring intubation, improved on extubated  NSTEMI  Alcohol liver cirrhosis with varices  Seizure-like activity  Left non-obstructive kidney stone  History of prostate cancer status post RRP and adjuvant radiation at [facility]  Gross hematuria, traumatic catheter removal      PLAN:  He is currently on treatment with oxygen.  Awaiting morning labs.  Continue Keppra per neurology.  Patient cannot have MRI as he could not answer the screen screening questions.  Urology is currently following Macdonald was placed given hematuria.  Patient will likely benefit from cystoscopy at some point.  Urine culture and cytology pending.  Continue Aztreonam for total of 7 days treatment course.  Hypophosphatemia-replace  Polymorphic VT/VF arrest, cardiomyopathy with wall motion abnormalities EF 35 to 40% cardiology consult appreciated-planning left heart catheterization      DISPOSITION:     Medications:  REVIEWED DAILY    Infusion Medications    dextrose      lactated ringers IV soln 125 mL/hr at 02/20/24 0917    sodium 
Or  potassium chloride 10 mEq/100 mL IVPB (Peripheral Line), PRN  lidocaine (XYLOCAINE) 2 % uro-jet, PRN  pantoprazole (PROTONIX) 40 mg in sodium chloride (PF) 0.9 % 10 mL injection, Daily  melatonin disintegrating tablet 5 mg, Nightly PRN  hydrALAZINE (APRESOLINE) injection 10 mg, Q6H PRN  calcium carbonate (TUMS) chewable tablet 500 mg, TID PRN  Polyvinyl Alcohol-Povidone PF (REFRESH) 1.4-0.6 % ophthalmic solution 1 drop, PRN  sodium chloride (OCEAN, BABY AYR) 0.65 % nasal spray 1 spray, PRN  magnesium hydroxide (MILK OF MAGNESIA) 400 MG/5ML suspension 30 mL, Daily PRN  benzocaine-menthol (CEPACOL SORE THROAT) lozenge 1 lozenge, Q2H PRN  benzonatate (TESSALON) capsule 100 mg, TID PRN  glucose chewable tablet 16 g, PRN  dextrose bolus 10% 125 mL, PRN   Or  dextrose bolus 10% 250 mL, PRN  glucagon injection 1 mg, PRN  dextrose 10 % infusion, Continuous PRN  hydrOXYzine pamoate (VISTARIL) capsule 25 mg, TID PRN  levETIRAcetam (KEPPRA) injection 1,500 mg, Q12H  lactated ringers IV soln infusion, Continuous  aztreonam (AZACTAM) 2,000 mg in sodium chloride 0.9 % 100 mL IVPB (mini-bag), Q8H  perflutren lipid microspheres (DEFINITY) injection 1.5 mL, ONCE PRN  0.9 % sodium chloride infusion, PRN  [Held by provider] furosemide (LASIX) tablet 40 mg, BID  [Held by provider] nadolol (CORGARD) tablet 20 mg, Daily  sodium chloride flush 0.9 % injection 5-40 mL, 2 times per day  sodium chloride flush 0.9 % injection 5-40 mL, PRN  0.9 % sodium chloride infusion, PRN  magnesium sulfate 2000 mg in 50 mL IVPB premix, PRN  polyethylene glycol (GLYCOLAX) packet 17 g, Daily PRN  acetaminophen (TYLENOL) tablet 650 mg, Q6H PRN   Or  acetaminophen (TYLENOL) suppository 650 mg, Q6H PRN        Review of systems:     Heart: as above   Lungs: as above   Further is unobtainable   Psych: As above.         Physical exam:    Constitutional: As above.  A&O x2, no acute distress.  Eyes: extraocular muscles intact, PERRL.  Normal lids & conjunctiva. 
undetectable  Urine cytology pending  Continue the Macdonald  Voiding trial can be considered closer to discharge if he is medically stable and ambulatory.  Please do not remove Macdonald unless speaking with urology due to the difficult nature of placing the Macdonald        HEIDE Carrasco - CNP   [facility]  Urology      
04:17 AM    CALCIUM 6.9 02/19/2024 04:17 AM    BILITOT 6.4 02/19/2024 04:17 AM    ALKPHOS 132 02/19/2024 04:17 AM     02/19/2024 04:17 AM    ALT 28 02/19/2024 04:17 AM     FLP:    Lab Results   Component Value Date/Time    TRIG 93 08/25/2023 02:35 PM    HDL 71 08/25/2023 02:35 PM     CT head  Negative for acute abnormalities    Repeat CT head  Negative for acute abnormalities    CT lumbar spine  No evidence of acute lumbar spine trauma.     CT thoracic spine  No evidence of acute thoracic spine trauma.     Mild multilevel degenerative changes.     Left pleural effusion.    All labs and imaging studies reviewed independently today           HEIDE Schafer - CNP  12:24 PM  2/19/2024  
50-55%, normal.  - TTE (5/1/2023): LVEF = 60 to 65%, left pleural effusion and ascites.    Assessment/Recommendations  Polymorphic VT/VF arrest.  Long QT in the setting of electrolyte abnormalities -- improved  Anemia (Hgb 9.6 --> 9.5) / thrombocytopenia (plt 104 --> 84)  NSTEMI / CAD s/p intervention as outlined above  HFrEF / new cardiomyopathy with wall motion abnormalities.  EF dropped to 20 to 25% after his cardiac arrest. EF on repeat echo 35-40%.  Moderate mitral regurgitation.  Mild on repeat echocardiogram.  Acute hypoxic respiratory failure.  Improved.  History of alcohol abuse  ETOH cirrhosis with esophageal varices and recurring ascites. History of GI bleed 2/2 portal hypertensive gastropathy s/p cautery 1/16/2024. Scheduled for another paracentesis today.  History of prostate cancer  Tobacco abuse  Encephalopathy -- improved    Continue ASA, plavix, and statin  Continue Toprol XL  Entresto added 2/6/24  Continue to replace electrolytes PRN  Monitor labs  Telemetry reviewed (SR)  Care per EP / LifeVest prior to d/c  Serial echocardiograms  Treatment options for CAD discussed with the patient again today    Greater than 50 minutes was spent counseling the patient, reviewing the rationale for the above recommendations and reviewing the patient's current medication list, problem list and results of all previously ordered testing.    Talat Hsu MD  [facility] Cardiology  
and ascites.    Assessment/Recommendations  Polymorphic VT/VF arrest.  Long QT in the setting of electrolyte abnormalities -- improved  Anemia (Hgb 9.6) / thrombocytopenia (plt 104)  NSTEMI / CAD s/p intervention as outlined above  HFrEF / new cardiomyopathy with wall motion abnormalities.  EF dropped to 20 to 25% after his cardiac arrest. EF on repeat echo 35-40%.  Moderate mitral regurgitation.  Mild on repeat echocardiogram.  Acute hypoxic respiratory failure.  Improved.  History of alcohol abuse  ETOH cirrhosis with esophageal varices and recurring ascites. History of GI bleed 2/2 portal hypertensive gastropathy s/p cautery 1/16/2024. Scheduled for another paracentesis today.  History of prostate cancer  Tobacco abuse  Encephalopathy -- improved    Continue ASA, plavix, and statin  Continue Toprol XL  Will add entresto  Continue to replace electrolytes PRN  Monitor labs  Telemetry reviewed (SR)  Care per EP / LifeVest prior to d/c  Serial echocardiograms  Treatment options for CAD discussed with the patient today    Greater than 50 minutes was spent counseling the patient, reviewing the rationale for the above recommendations and reviewing the patient's current medication list, problem list and results of all previously ordered testing.    Talat Hsu MD  [facility] Cardiology  
during ax w/ use of WW for safety - moderately Dizzy w/ standing    stan SILVA present this day SUP/Set up      Bed Mobility  Supine to sit: Mod A   Sit to supine:  mod A      Mod VCs for safety    Logroll: Mod A  Sup > Sit: Mod A  Sit > Sup: NT  Min A to scoot to EOB.  Supine to sit: IND  Sit to supine: IND      Functional Transfers Mod A     Sit<>stand from EOB 2x w/ seated rest break b/t trials  Pt ed for safety/hand placement    Max A x2 sit <> stand, SPT with ww to bedside chair  SUP      Functional Mobility Max A w/ WW     Able to tolerate 1-2 side-steps along EOB w/ Mod VCs for safety, PLB - limited by moderate Dizziness, weakness and Cognition  Pt ed for safety/improved safety awareness, walker safety     Max A x2 with ww few steps towards bedside chair SUP      Balance Sitting:     Static:  Close SUP EOB    Dynamic:  Close SUP<>Min A w/ functional ax EOB - slight/occasional Posterior lean      Standing:     Static:  Mod A w/ WW    Dynamic:  Mod-Max A w/ functional ax/mobility w/ WW     Static sit EOB: SBA    Dynamic sit EOB during ADLs: Min A    Standing: Max A x2 Sitting:     Static:  IND    Dynamic:  IND w/ functional ax     Standing:     Static:  SUP w/ AD PRN    Dynamic:  SUP w/ functional ax/mobility w/ AD PRN   Activity Tolerance Fair     Sitting Tolerance w/ light ax ~ 25 minutes  Standing Tolerance w/ light ax ~ 15 seconds + 60 seconds    F-  Fair(+)   Visual/  Perceptual     Hearing: WFL  Glasses: Yes - broken during recent fall, reading glasses at b/s     WFL   Hearing Aids:  No       Comments: Upon arrival pt supine in bed,  present, agreeable to OT, requested & cleared by RN this day.  Therapist facilitated bed mobility/ADLs/functional transfers/mobility training with focus on safety, technique, precautions. Minimal dizziness reported with change of positions this day, increased time taken.  BP seated 174/72, spO2 on RA remained in low 90's throughout, HR 92 bpm.  Pt. Instructed 
    Procal:   Recent Labs     02/16/24  1830   PROCAL 1.16*     CBC:   Recent Labs     02/14/24  1028 02/15/24  0523 02/16/24  0815   WBC 6.8 5.1 7.1   HGB 11.8* 11.2* 11.7*   HCT 34.4* 32.3* 33.9*   MCV 95.3 93.9 95.5   RDW 15.7* 15.9* 15.2*     ABGs: No results for input(s): \"PHART\", \"AEL7BAK\", \"PO2ART\", \"RJW6JYR\", \"L9NVIVGM\", \"FIO2A\" in the last 72 hours.  Lactic Acid:   Recent Labs     02/16/24  0800 02/16/24  0815 02/16/24 2024   LACTA DUPLICATE ORDER 8.2* 2.6*     INR:   Recent Labs     02/14/24  1028 02/15/24  1158 02/16/24  0815   INR 1.3 1.4 1.5       Cardiac Injury Profile: No results for input(s): \"CKTOTAL\", \"CKMB\", \"TROPONINI\" in the last 72 hours.   Labs in Last 3 months:   Lab Results   Component Value Date    PSA <0.01 02/15/2024    INR 1.5 02/16/2024        Microbiology-  Urine Cx: No results found for: \"LABURIN\"  Blood Cx: No results found for: \"BC\"  Sputum Cx: No results found for: \"RESPCULTURE\"  Gram Stain:   Lab Results   Component Value Date    LABGRAM Refer to ordered Gram stain for results 05/04/2023     PNA PCR: No results found for: \"PNPCRPNL\"  COVID19: No results found for: \"COVID19\"  Legionella Ag: No results found for: \"LEGIONELLAANTIGEN\"  Strep Ag: No results for input(s): \"STREPNEUMAGU\" in the last 72 hours.      Imaging-   Chest x-ray reviewed which shows endotracheal tube in place appropriately        Assessment and Plan:      Principal Problem:    Syncope and collapse  Active Problems:    Upper GI bleeding    Cardiac arrest (HCC)  Resolved Problems:    * No resolved hospital problems. *          ASSESSMENT:    Acute hypoxemic respiratory failure  Status post polymorphic ventricular tachycardia and ventricular fibrillation arrest  Hypokalemia  Prolonged QTc  Known alcoholic cirrhosis  Seizure activity  Esophageal varices  Upper GI bleeding  Multiple falls  Fever  Hypotension      PLAN:    Continue mechanical ventilation.  Daily SAT and SBT as appropriate  Repeat CT of the head today 
would benefit from continued skilled OT to increase safety and independence with completion of ADL/IADL tasks for functional independence and quality of life.    At end of session pt left semi supine in bed, call light within reach, bed alarm on.     Pt has made fair- progress towards set goals.     Continue with current plan of care    Treatment Time In:1050            Treatment Time Out: 1113             Treatment Charges: Mins Units   Ther Ex  45814     Manual Therapy 83394     Thera Activities 66191 13 1   ADL/Home Mgt 32795 10 1   Neuro Re-ed 06236     Group Therapy      Orthotic manage/training  61980     Non-Billable Time     Total Timed Treatment 23 2     [place] TAYLOR/L 23944     
mL IntraVENous ONCE PRN Arvind Lowe, DO        0.9 % sodium chloride infusion   IntraVENous PRN Arvind Lowe, DO        [Held by provider] furosemide (LASIX) tablet 40 mg  40 mg Oral BID Thuy Madsen MD   40 mg at 02/15/24 1751    [Held by provider] nadolol (CORGARD) tablet 20 mg  20 mg Oral Daily Thuy Madsen MD   20 mg at 02/16/24 1219    sodium chloride flush 0.9 % injection 5-40 mL  5-40 mL IntraVENous 2 times per day Avrind Lowe, DO   10 mL at 02/27/24 0838    sodium chloride flush 0.9 % injection 5-40 mL  5-40 mL IntraVENous PRN Arvind Lowe, DO   10 mL at 02/19/24 0810    0.9 % sodium chloride infusion   IntraVENous PRN Arvind Lowe, DO        magnesium sulfate 2000 mg in 50 mL IVPB premix  2,000 mg IntraVENous PRN Arvind Lowe, DO   Stopped at 02/21/24 2111    polyethylene glycol (GLYCOLAX) packet 17 g  17 g Oral Daily PRN Arvind Lowe, DO            Allergies   Allergen Reactions    Penicillins      Patient states was a little kid \"airway blocked off\"     PHYSICAL EXAM:   Vitals:    02/27/24 0700 02/27/24 0831 02/27/24 0902 02/27/24 1131   BP: (!) 124/58   (!) 118/59   Pulse: 65 66  67   Resp: 18  16 16   Temp: 98.6 °F (37 °C)   98.6 °F (37 °C)   TempSrc: Temporal   Temporal   SpO2: 95%   96%   Weight:       Height:          Constitutional: Well-developed, pleasant following commands and alert.   Eyes: icteric sclera  Ears, Nose, Throat: oral mucosa moist, no cyanosis   CV: no JVD or leg edema, laterally displaced PMI, regular rate and rhythm. Normal S1S2 and no S3. No murmurs, rubs, or gallops.   Lungs: decrease breath sound at bases bilaterally  Abdomen: soft,  ascites present, bowel sounds present  Musculoskeletal: no digital clubbing, edema of LEs   Skin: warm, petechiae and ecchymosis  Neurologic: A&O x 2, ongoing confusion.     I have personally reviewed the laboratory, cardiac diagnostic and radiographic testing as outlined below:    Data:    Recent Labs     
in regards to direct patient care, chart review, a personal history and physical examination, complex medical decision making, coordination of critical care and d/w pt, and staff.  No family present at bedside.     This patient has a high probability of sudden, clinically significant deterioration, which requires the highest level of physician preparedness to intervene urgently.  I managed/supervised life or organ supporting interventions that required frequent physician assessment.  I am readily available for any further decision making or interventions.     Alli Schilling MD, [facility] Cardiology    NOTE: This report was transcribed using voice recognition software. Every effort was made to ensure accuracy; however, inadvertent computerized transcription errors may be present.    
Cardiology.  - BB, Aspirin, Plavix, Statin.     Respiratory failure   - Extubated 2/18/2024 now on room air.   - Management per ICU.    Anemia/thrombocytopenia   - In the setting of EtOH cirrhosis.  - Management per ICU/admitting service.    Seizures   - EEG normal.  - Management per neurology.     EtOH cirrhosis  - Patient with known ascites and grade 2 esophageal varices.  - LFT elevated in the pattern of Alcohol abuse.   - Portal hypertensive gastropathy with focal areas of low-grade active hemorrhage.   -Severe ascites status post paracentesis 02/26/2024 with a total volume of 6.1 L drained that was clear and straw-colored.  - Dr. Hernandez consulted.     GI bleed 2/2 portal hypertensive gastropathy sp bipolar cautery (1/16/2024)  -Previously on nadolol however that was discontinued for Toprol during this admission.  -- GI consulted.     Colon polyps sp removal (1/16/2024)  -- GI consulted. Recommendations pending.      Plan:   LifeVest at discharge  Keep Mag above 2 and K above 4   Ongoing management of LAD/D1 per cardiology, given HfrEF and PMVT would consider intervention.   Electrophysiology to sign off.  Will see as outpatient    All of the above was discussed with the patient ,>50% of the time involved face-to-face time providing counseling and or coordination of care with the other providers, reviewing records/tests, counseling/education of the patient, ordering medications/tests/procedures, coordinating care, and documenting clinical information in the EHR.   Thank you for allowing me to participate in your patient's care.  Please call me if there are any questions or concerns.      Sarah Beth Oglesby MD  Cardiac Electrophysiology  [facility]  [facility] [place]: [place]  Electrophysiology  8:57 AM  2/28/2024  
ischemia, yet unable to assess for ischemia at this time due to thrombocytopenia, anemia and know GI bleed as well as his ongoing mental status.    - Awaiting limited TTE on 02/19/2024.     2. Prolonged QT  - In the setting of electrolytes abnormalities.  - Can not exclude underlying cardiac ischemia in the setting of new LV function.  - Improving gradually.     3. Electrolytes abnormalities  - Hypokalemia and hypomagnesemia.  - Renal loss of diuretic versus GI loss versus other causes    4. Respiratory failure  - Intubated and ventilator.    5. Alcoholic cirrhosis of liver with ascites    6. Esophageal varices    7. Upper GI bleeding    8. Multiple falls     9. Prostate cancer    10. Sezures   - Noted post arrest 02/16/2024 and recurred 02/17/24.     Recommendations:  Agressive replace potassium and magnesium to keep potassium around 4 and magnesium around 2, Increase frequency of electrolyte assessment to every 12 hours at this time.   Avoid QT prolongation medications.  Consider Nephrology consult given ongoing hypokalemia/magnesemia without obvious source of loss at this time.   Monitor QT closely.  Consider start IV Lidocaine if PMVT.   Keep defibrillator pads at chest wall.     I have spent a total of 10  minutes with the patient and the family reviewing the above stated recommendations.  And a total of >50% of that time involved face-to-face time providing counseling and or coordination of care with the other providers, reviewing records/tests, counseling/education of the patient, ordering medications/tests/procedures, coordinating care, and documenting clinical information in the EHR.     Thank you for allowing me to participate in your patient's care.  Please call me if there are any questions or concerns.      Discussed with Dr. Ross.   Collins Carrasquillo, HEIDE - CNP  Cardiac Electrophysiology  [facility]  [facility] [place]: [place] Electrophysiology  9:31 
patient/family/caregiver.  Counseling/educating the patient/family/caregiver.  Documenting clinical information in the patients electronic record.  Coordination of care for the patient.  Performing a medical appropriate exam and/or evaluation.    Sarah Beth Oglesby MD  Cardiac electrophysiology  [facility]       
shorten the QTc.   Monitor QT closely.  Consider start IV Lidocaine if PMVT/ vs Addition of DOPamine at that time.   Keep defibrillator pads at chest wall.     I have spent a total of 10  minutes with the patient and the family reviewing the above stated recommendations.  And a total of >50% of that time involved face-to-face time providing counseling and or coordination of care with the other providers, reviewing records/tests, counseling/education of the patient, ordering medications/tests/procedures, coordinating care, and documenting clinical information in the EHR.     Thank you for allowing me to participate in your patient's care.  Please call me if there are any questions or concerns.      Discussed with Dr. Ross.   Collins Carrasquillo, APRN - CNP  Cardiac Electrophysiology  [facility]  The [facility] V[facility] [place]: [place] Electrophysiology  10:09 AM  2/17/2024    Attending Physician's Statement    Patient seen with the ANP. Agree with the findings, assessment and plan. Management plan was discussed. I have personally interviewed the patient, independently performed a focused cardiac examination, reviewed the pertinent laboratory and diagnostic testing with the patient and directly participated in the medical decision-making as noted above with the following additions:     Intubated and sedated. Developed fever this AM. Responding to pain. Currently in sinus bradycardia in 50's bpm. Qtc 714 ms on EKG this AM. Echo showed severely reduced left ventricular systolic function with a visually estimated EF of 20 - 25%. Severe global hypokinesis present. Inferior and inferolateral akinesis. Moderate MR. Repeat CT of the head did not show any acute abnormality and no hemorrhage.     Physical exam showed no JVD, RRR, normal S1S2, no murmur, clear lungs bilaterally, edema of Les    Switch Levophed to Dopamine to titrate to keep HR around  bpm. Avoid QT prolongation medications. Aggressive correct

## 2024-02-29 NOTE — CARE COORDINATION
2/29/24, SW Update-Discharge Acknowledged.  Patient has precert to go to [facility] in [place].  Patient will be picked up by Zachary Holcomb at 5pm later today by stretcher.  Reason for later time is patient will need to complete his IV treatment prior to discharge.   PAS/RR, face sheet, ambulance form and envelope is on face sheet.  Nursing updated on the above.  SW to follow.      SAMARIA Oleary  [facility] Case Management  [phone]

## 2024-02-29 NOTE — DISCHARGE SUMMARY
Pt discharged to [facility] via stretcher with [facility] ambulance service. Pt wearing life vest upon discharge. Instructions reviewed again with pt.   
(with meals)     clopidogrel 75 MG tablet  Commonly known as: PLAVIX  Take 1 tablet by mouth daily     hydrOXYzine pamoate 25 MG capsule  Commonly known as: VISTARIL  Take 1 capsule by mouth 3 times daily as needed for Anxiety     levETIRAcetam 750 MG tablet  Commonly known as: KEPPRA  Take 2 tablets by mouth 2 times daily     phosphorus 155-852-130 MG tablet  Commonly known as: K PHOS NEUTRAL  Take 1 tablet by mouth 2 times daily     rosuvastatin 40 MG tablet  Commonly known as: CRESTOR  Take 1 tablet by mouth nightly     sacubitril-valsartan 24-26 MG per tablet  Commonly known as: ENTRESTO  Take 1 tablet by mouth 2 times daily     sodium chloride 0.65 % nasal spray  Commonly known as: OCEAN, BABY AYR  1 spray by Nasal route as needed for Congestion            CHANGE how you take these medications      furosemide 40 MG tablet  Commonly known as: LASIX  Take 1 tablet by mouth in the morning and 1 tablet in the evening.  What changed: when to take this            CONTINUE taking these medications      pantoprazole 40 MG tablet  Commonly known as: PROTONIX  Take 1 tablet by mouth 2 times daily (before meals)     potassium chloride 20 MEQ extended release tablet  Commonly known as: KLOR-CON M  Take 1 tablet by mouth daily            STOP taking these medications      nadolol 20 MG tablet  Commonly known as: Corgard               Where to Get Your Medications        Information about where to get these medications is not yet available    Ask your nurse or doctor about these medications  aspirin 81 MG EC tablet  benzonatate 100 MG capsule  calcium carbonate 500 MG chewable tablet  carvedilol 12.5 MG tablet  clopidogrel 75 MG tablet  furosemide 40 MG tablet  hydrOXYzine pamoate 25 MG capsule  levETIRAcetam 750 MG tablet  phosphorus 155-852-130 MG tablet  rosuvastatin 40 MG tablet  sacubitril-valsartan 24-26 MG per tablet  sodium chloride 0.65 % nasal spray         40 minutes was spent in preparing discharge papers,

## 2024-02-29 NOTE — CARE COORDINATION
2/29/24, Late Entry from yesterday.  Patient has been accepted to [facility] in [place] per Elena.  Precert was stated late yesterday (early evening).  Spoke with Pat from [facility] in [place] today and precert is till pending.  PAS/RR, face sheet and ambulance form on soft chart.  Ambulance form will need completed on day of discharge.  Nursing updated.  SW to follow.      SAMARIA Oleary  [facility] Case Management  [phone]

## 2024-02-29 NOTE — CARE COORDINATION
2/29/24, SW met with patient at bedside to discuss discharge plan which patient is in agreement with to go to [facility] in [place].  Call placed to Benito (close friend) kasie left and brother Vicente informed them of patient leaving later for facility. All discharge paperwork is on soft chart.  Nursing informed of  time by Zachary Holcomb for a 5pm .  SW to follow.      SAMARIA Oleary  [facility] Case Management  [phone]

## 2024-03-12 ENCOUNTER — APPOINTMENT (OUTPATIENT)
Dept: GENERAL RADIOLOGY | Age: 69
DRG: 871 | End: 2024-03-12
Payer: COMMERCIAL

## 2024-03-12 ENCOUNTER — HOSPITAL ENCOUNTER (INPATIENT)
Age: 69
LOS: 1 days | DRG: 871 | End: 2024-03-13
Attending: EMERGENCY MEDICINE | Admitting: INTERNAL MEDICINE
Payer: COMMERCIAL

## 2024-03-12 ENCOUNTER — APPOINTMENT (OUTPATIENT)
Dept: CT IMAGING | Age: 69
DRG: 871 | End: 2024-03-12
Payer: COMMERCIAL

## 2024-03-12 DIAGNOSIS — A41.9 SEPTIC SHOCK (HCC): Primary | ICD-10-CM

## 2024-03-12 DIAGNOSIS — E72.20 HYPERAMMONEMIA (HCC): ICD-10-CM

## 2024-03-12 DIAGNOSIS — R65.21 SEPTIC SHOCK (HCC): Primary | ICD-10-CM

## 2024-03-12 DIAGNOSIS — E87.20 LACTIC ACIDOSIS: ICD-10-CM

## 2024-03-12 DIAGNOSIS — N17.9 ACUTE RENAL FAILURE, UNSPECIFIED ACUTE RENAL FAILURE TYPE (HCC): ICD-10-CM

## 2024-03-12 DIAGNOSIS — K72.90 LIVER FAILURE WITHOUT HEPATIC COMA, UNSPECIFIED CHRONICITY (HCC): ICD-10-CM

## 2024-03-12 LAB
AADO2: 123.5 MMHG
AADO2: 202.8 MMHG
ALBUMIN SERPL-MCNC: 2.9 G/DL (ref 3.5–5.2)
ALP SERPL-CCNC: 378 U/L (ref 40–129)
ALT SERPL-CCNC: 176 U/L (ref 0–40)
AMMONIA PLAS-SCNC: 323 UMOL/L (ref 16–60)
AMPHET UR QL SCN: NEGATIVE
ANION GAP SERPL CALCULATED.3IONS-SCNC: 36 MMOL/L (ref 7–16)
ANION GAP SERPL CALCULATED.3IONS-SCNC: 40 MMOL/L (ref 7–16)
APAP SERPL-MCNC: <5 UG/ML (ref 10–30)
AST SERPL-CCNC: 861 U/L (ref 0–39)
B PARAP IS1001 DNA NPH QL NAA+NON-PROBE: NOT DETECTED
B PERT DNA SPEC QL NAA+PROBE: NOT DETECTED
B.E.: -20.1 MMOL/L (ref -3–3)
B.E.: -25.2 MMOL/L (ref -3–3)
B.E.: -27.8 MMOL/L (ref -3–3)
BACTERIA URNS QL MICRO: ABNORMAL
BARBITURATES UR QL SCN: NEGATIVE
BASOPHILS # BLD: 0.09 K/UL (ref 0–0.2)
BASOPHILS NFR BLD: 1 % (ref 0–2)
BENZODIAZ UR QL: NEGATIVE
BILIRUB DIRECT SERPL-MCNC: 2.9 MG/DL (ref 0–0.3)
BILIRUB INDIRECT SERPL-MCNC: 1.5 MG/DL (ref 0–1)
BILIRUB SERPL-MCNC: 4.4 MG/DL (ref 0–1.2)
BILIRUB UR QL STRIP: NEGATIVE
BUN BLD-MCNC: 43 MG/DL (ref 6–23)
BUN SERPL-MCNC: 40 MG/DL (ref 6–23)
BUN SERPL-MCNC: 43 MG/DL (ref 6–23)
BUPRENORPHINE UR QL: NEGATIVE
C PNEUM DNA NPH QL NAA+NON-PROBE: NOT DETECTED
CALCIUM SERPL-MCNC: 6.9 MG/DL (ref 8.6–10.2)
CALCIUM SERPL-MCNC: 7.7 MG/DL (ref 8.6–10.2)
CANNABINOIDS UR QL SCN: NEGATIVE
CHLORIDE BLD-SCNC: 105 MMOL/L (ref 100–108)
CHLORIDE SERPL-SCNC: 92 MMOL/L (ref 98–107)
CHLORIDE SERPL-SCNC: 93 MMOL/L (ref 98–107)
CLARITY UR: ABNORMAL
CO2 BLD CALC-SCNC: <5 MMOL/L (ref 22–29)
CO2 SERPL-SCNC: 4 MMOL/L (ref 22–29)
CO2 SERPL-SCNC: 5 MMOL/L (ref 22–29)
COCAINE UR QL SCN: NEGATIVE
COHB: 0.1 % (ref 0–1.5)
COHB: 0.3 % (ref 0–1.5)
COHB: 0.5 % (ref 0–1.5)
COLOR UR: ABNORMAL
CREAT BLD-MCNC: 8.6 MG/DL (ref 0.7–1.2)
CREAT SERPL-MCNC: 6.8 MG/DL (ref 0.7–1.2)
CREAT SERPL-MCNC: 7.4 MG/DL (ref 0.7–1.2)
CRITICAL: ABNORMAL
DATE ANALYZED: ABNORMAL
DATE OF COLLECTION: ABNORMAL
EGFR, POC: 6 ML/MIN/1.73M2
EOSINOPHIL # BLD: 0 K/UL (ref 0.05–0.5)
EOSINOPHILS RELATIVE PERCENT: 0 % (ref 0–6)
ERYTHROCYTE [DISTWIDTH] IN BLOOD BY AUTOMATED COUNT: 18.2 % (ref 11.5–15)
ERYTHROCYTE [DISTWIDTH] IN BLOOD BY AUTOMATED COUNT: 18.6 % (ref 11.5–15)
ETHANOLAMINE SERPL-MCNC: <10 MG/DL
FENTANYL UR QL: NEGATIVE
FIO2: 60 %
FIO2: 70 %
FLUAV RNA NPH QL NAA+NON-PROBE: NOT DETECTED
FLUAV RNA RESP QL NAA+PROBE: NOT DETECTED
FLUBV RNA NPH QL NAA+NON-PROBE: NOT DETECTED
FLUBV RNA RESP QL NAA+PROBE: NOT DETECTED
GFR SERPL CREATININE-BSD FRML MDRD: 7 ML/MIN/1.73M2
GFR SERPL CREATININE-BSD FRML MDRD: 8 ML/MIN/1.73M2
GLUCOSE BLD-MCNC: 110 MG/DL (ref 74–99)
GLUCOSE BLD-MCNC: 144 MG/DL (ref 74–99)
GLUCOSE BLD-MCNC: 157 MG/DL (ref 74–99)
GLUCOSE BLD-MCNC: 207 MG/DL (ref 74–99)
GLUCOSE BLD-MCNC: <40 MG/DL (ref 74–99)
GLUCOSE SERPL-MCNC: 130 MG/DL (ref 74–99)
GLUCOSE SERPL-MCNC: 175 MG/DL (ref 74–99)
GLUCOSE UR STRIP-MCNC: NEGATIVE MG/DL
HADV DNA NPH QL NAA+NON-PROBE: NOT DETECTED
HCO3: 3.3 MMOL/L (ref 22–26)
HCO3: 4.8 MMOL/L (ref 22–26)
HCO3: 8.1 MMOL/L (ref 22–26)
HCOV 229E RNA NPH QL NAA+NON-PROBE: NOT DETECTED
HCOV HKU1 RNA NPH QL NAA+NON-PROBE: NOT DETECTED
HCOV NL63 RNA NPH QL NAA+NON-PROBE: NOT DETECTED
HCOV OC43 RNA NPH QL NAA+NON-PROBE: NOT DETECTED
HCT VFR BLD AUTO: 23.6 % (ref 37–54)
HCT VFR BLD AUTO: 25.7 % (ref 37–54)
HCT VFR BLD AUTO: 31.5 % (ref 37–54)
HGB BLD-MCNC: 7.2 G/DL (ref 12.5–16.5)
HGB BLD-MCNC: 7.7 G/DL (ref 12.5–16.5)
HGB BLD-MCNC: 9.4 G/DL (ref 12.5–16.5)
HGB UR QL STRIP.AUTO: ABNORMAL
HHB: 0.4 % (ref 0–5)
HHB: 0.6 % (ref 0–5)
HHB: 1.3 % (ref 0–5)
HMPV RNA NPH QL NAA+NON-PROBE: NOT DETECTED
HPIV1 RNA NPH QL NAA+NON-PROBE: NOT DETECTED
HPIV2 RNA NPH QL NAA+NON-PROBE: NOT DETECTED
HPIV3 RNA NPH QL NAA+NON-PROBE: NOT DETECTED
HPIV4 RNA NPH QL NAA+NON-PROBE: NOT DETECTED
INR PPP: 3.9
KETONES UR STRIP-MCNC: NEGATIVE MG/DL
LAB: ABNORMAL
LACTATE BLDV-SCNC: 21 MMOL/L (ref 0.5–2.2)
LACTATE BLDV-SCNC: 23.5 MMOL/L (ref 0.5–2.2)
LACTATE BLDV-SCNC: 23.8 MMOL/L (ref 0.5–1.9)
LACTATE BLDV-SCNC: 23.9 MMOL/L (ref 0.5–1.9)
LEUKOCYTE ESTERASE UR QL STRIP: ABNORMAL
LYMPHOCYTES NFR BLD: 1 K/UL (ref 1.5–4)
LYMPHOCYTES RELATIVE PERCENT: 10 % (ref 20–42)
Lab: 1115
Lab: 1817
Lab: 2230
M PNEUMO DNA NPH QL NAA+NON-PROBE: NOT DETECTED
MAGNESIUM SERPL-MCNC: 2.1 MG/DL (ref 1.6–2.6)
MCH RBC QN AUTO: 32.3 PG (ref 26–35)
MCH RBC QN AUTO: 33 PG (ref 26–35)
MCHC RBC AUTO-ENTMCNC: 29.8 G/DL (ref 32–34.5)
MCHC RBC AUTO-ENTMCNC: 30 G/DL (ref 32–34.5)
MCV RBC AUTO: 108.2 FL (ref 80–99.9)
MCV RBC AUTO: 110.3 FL (ref 80–99.9)
METHADONE UR QL: NEGATIVE
METHB: 0.4 % (ref 0–1.5)
METHB: 0.6 % (ref 0–1.5)
METHB: 0.7 % (ref 0–1.5)
MODE: ABNORMAL
MODE: AC
MODE: AC
MONOCYTES NFR BLD: 0.36 K/UL (ref 0.1–0.95)
MONOCYTES NFR BLD: 4 % (ref 2–12)
NEUTROPHILS NFR BLD: 86 % (ref 43–80)
NEUTS SEG NFR BLD: 8.94 K/UL (ref 1.8–7.3)
NITRITE UR QL STRIP: NEGATIVE
O2 CONTENT: 11.1 ML/DL
O2 CONTENT: 13.7 ML/DL
O2 SATURATION: 98.7 % (ref 92–98.5)
O2 SATURATION: 99.4 % (ref 92–98.5)
O2 SATURATION: 99.6 % (ref 92–98.5)
O2HB: 97.6 % (ref 94–97)
O2HB: 98.7 % (ref 94–97)
O2HB: 98.8 % (ref 94–97)
OPERATOR ID: 1768
OPERATOR ID: 421
OPERATOR ID: 5323
OPIATES UR QL SCN: NEGATIVE
OXYCODONE UR QL SCN: NEGATIVE
PATIENT TEMP: 37 C
PCO2: 16.7 MMHG (ref 35–45)
PCO2: 21.6 MMHG (ref 35–45)
PCO2: 27.6 MMHG (ref 35–45)
PCP UR QL SCN: NEGATIVE
PEEP/CPAP: 8 CMH2O
PEEP/CPAP: 8 CMH2O
PFO2: 2.99 MMHG/%
PFO2: 4.78 MMHG/%
PH BLOOD GAS: 6.91 (ref 7.35–7.45)
PH BLOOD GAS: 6.97 (ref 7.35–7.45)
PH BLOOD GAS: 7.09 (ref 7.35–7.45)
PH UR STRIP: 5.5 [PH] (ref 5–9)
PHOSPHATE SERPL-MCNC: 10.7 MG/DL (ref 2.5–4.5)
PLATELET # BLD AUTO: 124 K/UL (ref 130–450)
PLATELET CONFIRMATION: NORMAL
PLATELET, FLUORESCENCE: 90 K/UL (ref 130–450)
PMV BLD AUTO: 11 FL (ref 7–12)
PMV BLD AUTO: 11.5 FL (ref 7–12)
PO2: 179.6 MMHG (ref 75–100)
PO2: 290.7 MMHG (ref 75–100)
PO2: 334.9 MMHG (ref 75–100)
POC ANION GAP: ABNORMAL MMOL/L (ref 7–16)
POTASSIUM BLD-SCNC: 5.2 MMOL/L (ref 3.5–5)
POTASSIUM SERPL-SCNC: 5.8 MMOL/L (ref 3.5–5)
POTASSIUM SERPL-SCNC: 5.9 MMOL/L (ref 3.5–5)
PROCALCITONIN SERPL-MCNC: 2.23 NG/ML (ref 0–0.08)
PROT SERPL-MCNC: 6.7 G/DL (ref 6.4–8.3)
PROT UR STRIP-MCNC: NEGATIVE MG/DL
PROTHROMBIN TIME: 41.5 SEC (ref 9.3–12.4)
RBC # BLD AUTO: 2.33 M/UL (ref 3.8–5.8)
RBC # BLD AUTO: 2.91 M/UL (ref 3.8–5.8)
RBC # BLD: ABNORMAL 10*6/UL
RBC #/AREA URNS HPF: ABNORMAL /HPF
RI(T): 0.37
RI(T): 1.13
RR MECHANICAL: 18 B/MIN
RR MECHANICAL: 24 B/MIN
RSV RNA NPH QL NAA+NON-PROBE: NOT DETECTED
RV+EV RNA NPH QL NAA+NON-PROBE: NOT DETECTED
SALICYLATES SERPL-MCNC: <0.3 MG/DL (ref 0–30)
SARS-COV-2 RNA NPH QL NAA+NON-PROBE: NOT DETECTED
SARS-COV-2 RNA RESP QL NAA+PROBE: NOT DETECTED
SODIUM BLD-SCNC: 138 MMOL/L (ref 132–146)
SODIUM SERPL-SCNC: 133 MMOL/L (ref 132–146)
SODIUM SERPL-SCNC: 137 MMOL/L (ref 132–146)
SOURCE, BLOOD GAS: ABNORMAL
SOURCE: NORMAL
SP GR UR STRIP: <1.005 (ref 1–1.03)
SPECIMEN DESCRIPTION: NORMAL
SPECIMEN DESCRIPTION: NORMAL
TEST INFORMATION: NORMAL
THB: 7.8 G/DL (ref 11.5–16.5)
THB: 9.2 G/DL (ref 11.5–16.5)
THB: 9.8 G/DL (ref 11.5–16.5)
TIME ANALYZED: 1119
TIME ANALYZED: 1829
TIME ANALYZED: 2236
TOXIC TRICYCLIC SC,BLOOD: NEGATIVE
TROPONIN I SERPL HS-MCNC: 155 NG/L (ref 0–11)
UROBILINOGEN UR STRIP-ACNC: 0.2 EU/DL (ref 0–1)
VT MECHANICAL: 450 ML
VT MECHANICAL: 450 ML
WBC #/AREA URNS HPF: ABNORMAL /HPF
WBC OTHER # BLD: 10.4 K/UL (ref 4.5–11.5)
WBC OTHER # BLD: 13.3 K/UL (ref 4.5–11.5)

## 2024-03-12 PROCEDURE — 84520 ASSAY OF UREA NITROGEN: CPT

## 2024-03-12 PROCEDURE — 85014 HEMATOCRIT: CPT

## 2024-03-12 PROCEDURE — 86900 BLOOD TYPING SEROLOGIC ABO: CPT

## 2024-03-12 PROCEDURE — 85610 PROTHROMBIN TIME: CPT

## 2024-03-12 PROCEDURE — 82140 ASSAY OF AMMONIA: CPT

## 2024-03-12 PROCEDURE — 80143 DRUG ASSAY ACETAMINOPHEN: CPT

## 2024-03-12 PROCEDURE — 2500000003 HC RX 250 WO HCPCS: Performed by: EMERGENCY MEDICINE

## 2024-03-12 PROCEDURE — C9113 INJ PANTOPRAZOLE SODIUM, VIA: HCPCS

## 2024-03-12 PROCEDURE — 94660 CPAP INITIATION&MGMT: CPT

## 2024-03-12 PROCEDURE — 81001 URINALYSIS AUTO W/SCOPE: CPT

## 2024-03-12 PROCEDURE — 5A1935Z RESPIRATORY VENTILATION, LESS THAN 24 CONSECUTIVE HOURS: ICD-10-PCS | Performed by: CHIROPRACTOR

## 2024-03-12 PROCEDURE — 83735 ASSAY OF MAGNESIUM: CPT

## 2024-03-12 PROCEDURE — 74018 RADEX ABDOMEN 1 VIEW: CPT

## 2024-03-12 PROCEDURE — 86901 BLOOD TYPING SEROLOGIC RH(D): CPT

## 2024-03-12 PROCEDURE — 80048 BASIC METABOLIC PNL TOTAL CA: CPT

## 2024-03-12 PROCEDURE — 86923 COMPATIBILITY TEST ELECTRIC: CPT

## 2024-03-12 PROCEDURE — 04HY32Z INSERTION OF MONITORING DEVICE INTO LOWER ARTERY, PERCUTANEOUS APPROACH: ICD-10-PCS | Performed by: CHIROPRACTOR

## 2024-03-12 PROCEDURE — 2580000003 HC RX 258: Performed by: EMERGENCY MEDICINE

## 2024-03-12 PROCEDURE — 71275 CT ANGIOGRAPHY CHEST: CPT

## 2024-03-12 PROCEDURE — 94002 VENT MGMT INPAT INIT DAY: CPT

## 2024-03-12 PROCEDURE — 80179 DRUG ASSAY SALICYLATE: CPT

## 2024-03-12 PROCEDURE — 2500000003 HC RX 250 WO HCPCS

## 2024-03-12 PROCEDURE — 84100 ASSAY OF PHOSPHORUS: CPT

## 2024-03-12 PROCEDURE — 31500 INSERT EMERGENCY AIRWAY: CPT

## 2024-03-12 PROCEDURE — 82805 BLOOD GASES W/O2 SATURATION: CPT

## 2024-03-12 PROCEDURE — 6370000000 HC RX 637 (ALT 250 FOR IP): Performed by: NURSE PRACTITIONER

## 2024-03-12 PROCEDURE — 0202U NFCT DS 22 TRGT SARS-COV-2: CPT

## 2024-03-12 PROCEDURE — P9047 ALBUMIN (HUMAN), 25%, 50ML: HCPCS

## 2024-03-12 PROCEDURE — 6360000002 HC RX W HCPCS

## 2024-03-12 PROCEDURE — 85025 COMPLETE CBC W/AUTO DIFF WBC: CPT

## 2024-03-12 PROCEDURE — 84484 ASSAY OF TROPONIN QUANT: CPT

## 2024-03-12 PROCEDURE — 86850 RBC ANTIBODY SCREEN: CPT

## 2024-03-12 PROCEDURE — 3E1M39Z IRRIGATION OF PERITONEAL CAVITY USING DIALYSATE, PERCUTANEOUS APPROACH: ICD-10-PCS | Performed by: CHIROPRACTOR

## 2024-03-12 PROCEDURE — 96361 HYDRATE IV INFUSION ADD-ON: CPT

## 2024-03-12 PROCEDURE — 90945 DIALYSIS ONE EVALUATION: CPT

## 2024-03-12 PROCEDURE — 74177 CT ABD & PELVIS W/CONTRAST: CPT

## 2024-03-12 PROCEDURE — 99291 CRITICAL CARE FIRST HOUR: CPT | Performed by: INTERNAL MEDICINE

## 2024-03-12 PROCEDURE — 0BH17EZ INSERTION OF ENDOTRACHEAL AIRWAY INTO TRACHEA, VIA NATURAL OR ARTIFICIAL OPENING: ICD-10-PCS | Performed by: CHIROPRACTOR

## 2024-03-12 PROCEDURE — 5A09357 ASSISTANCE WITH RESPIRATORY VENTILATION, LESS THAN 24 CONSECUTIVE HOURS, CONTINUOUS POSITIVE AIRWAY PRESSURE: ICD-10-PCS | Performed by: CHIROPRACTOR

## 2024-03-12 PROCEDURE — 2580000003 HC RX 258

## 2024-03-12 PROCEDURE — 96365 THER/PROPH/DIAG IV INF INIT: CPT

## 2024-03-12 PROCEDURE — 94640 AIRWAY INHALATION TREATMENT: CPT

## 2024-03-12 PROCEDURE — 71045 X-RAY EXAM CHEST 1 VIEW: CPT

## 2024-03-12 PROCEDURE — 36620 INSERTION CATHETER ARTERY: CPT

## 2024-03-12 PROCEDURE — 85018 HEMOGLOBIN: CPT

## 2024-03-12 PROCEDURE — 82565 ASSAY OF CREATININE: CPT

## 2024-03-12 PROCEDURE — 99222 1ST HOSP IP/OBS MODERATE 55: CPT | Performed by: NURSE PRACTITIONER

## 2024-03-12 PROCEDURE — 80051 ELECTROLYTE PANEL: CPT

## 2024-03-12 PROCEDURE — 2580000003 HC RX 258: Performed by: CHIROPRACTOR

## 2024-03-12 PROCEDURE — 02HV33Z INSERTION OF INFUSION DEVICE INTO SUPERIOR VENA CAVA, PERCUTANEOUS APPROACH: ICD-10-PCS | Performed by: CHIROPRACTOR

## 2024-03-12 PROCEDURE — 83605 ASSAY OF LACTIC ACID: CPT

## 2024-03-12 PROCEDURE — 36556 INSERT NON-TUNNEL CV CATH: CPT

## 2024-03-12 PROCEDURE — 87040 BLOOD CULTURE FOR BACTERIA: CPT

## 2024-03-12 PROCEDURE — 80307 DRUG TEST PRSMV CHEM ANLYZR: CPT

## 2024-03-12 PROCEDURE — 4A133J1 MONITORING OF ARTERIAL PULSE, PERIPHERAL, PERCUTANEOUS APPROACH: ICD-10-PCS | Performed by: CHIROPRACTOR

## 2024-03-12 PROCEDURE — 99285 EMERGENCY DEPT VISIT HI MDM: CPT

## 2024-03-12 PROCEDURE — 2580000003 HC RX 258: Performed by: INTERNAL MEDICINE

## 2024-03-12 PROCEDURE — 87154 CUL TYP ID BLD PTHGN 6+ TRGT: CPT

## 2024-03-12 PROCEDURE — 82248 BILIRUBIN DIRECT: CPT

## 2024-03-12 PROCEDURE — 82962 GLUCOSE BLOOD TEST: CPT

## 2024-03-12 PROCEDURE — 87081 CULTURE SCREEN ONLY: CPT

## 2024-03-12 PROCEDURE — 6360000002 HC RX W HCPCS: Performed by: CHIROPRACTOR

## 2024-03-12 PROCEDURE — 96375 TX/PRO/DX INJ NEW DRUG ADDON: CPT

## 2024-03-12 PROCEDURE — 96366 THER/PROPH/DIAG IV INF ADDON: CPT

## 2024-03-12 PROCEDURE — 84145 PROCALCITONIN (PCT): CPT

## 2024-03-12 PROCEDURE — 2500000003 HC RX 250 WO HCPCS: Performed by: INTERNAL MEDICINE

## 2024-03-12 PROCEDURE — 6360000004 HC RX CONTRAST MEDICATION: Performed by: RADIOLOGY

## 2024-03-12 PROCEDURE — 6360000002 HC RX W HCPCS: Performed by: EMERGENCY MEDICINE

## 2024-03-12 PROCEDURE — 87636 SARSCOV2 & INF A&B AMP PRB: CPT

## 2024-03-12 PROCEDURE — 80053 COMPREHEN METABOLIC PANEL: CPT

## 2024-03-12 PROCEDURE — 51702 INSERT TEMP BLADDER CATH: CPT

## 2024-03-12 PROCEDURE — A4216 STERILE WATER/SALINE, 10 ML: HCPCS

## 2024-03-12 PROCEDURE — 6370000000 HC RX 637 (ALT 250 FOR IP)

## 2024-03-12 PROCEDURE — 85027 COMPLETE CBC AUTOMATED: CPT

## 2024-03-12 PROCEDURE — 96367 TX/PROPH/DG ADDL SEQ IV INF: CPT

## 2024-03-12 PROCEDURE — 82947 ASSAY GLUCOSE BLOOD QUANT: CPT

## 2024-03-12 PROCEDURE — 4A133B1 MONITORING OF ARTERIAL PRESSURE, PERIPHERAL, PERCUTANEOUS APPROACH: ICD-10-PCS | Performed by: CHIROPRACTOR

## 2024-03-12 PROCEDURE — 70450 CT HEAD/BRAIN W/O DYE: CPT

## 2024-03-12 PROCEDURE — 2000000000 HC ICU R&B

## 2024-03-12 PROCEDURE — G0480 DRUG TEST DEF 1-7 CLASSES: HCPCS

## 2024-03-12 RX ORDER — PHENYLEPHRINE HYDROCHLORIDE 10 MG/ML
INJECTION INTRAVENOUS
Status: COMPLETED
Start: 2024-03-12 | End: 2024-03-12

## 2024-03-12 RX ORDER — KETAMINE HCL IN NACL, ISO-OSM 100MG/10ML
150 SYRINGE (ML) INJECTION ONCE
Status: COMPLETED | OUTPATIENT
Start: 2024-03-12 | End: 2024-03-12

## 2024-03-12 RX ORDER — CHLORHEXIDINE GLUCONATE ORAL RINSE 1.2 MG/ML
15 SOLUTION DENTAL 2 TIMES DAILY
Status: DISCONTINUED | OUTPATIENT
Start: 2024-03-12 | End: 2024-03-12

## 2024-03-12 RX ORDER — SODIUM CHLORIDE 0.9 % (FLUSH) 0.9 %
10 SYRINGE (ML) INJECTION
Status: DISCONTINUED | OUTPATIENT
Start: 2024-03-12 | End: 2024-03-13

## 2024-03-12 RX ORDER — FENTANYL CITRATE-0.9 % NACL/PF 20 MCG/2ML
50 SYRINGE (ML) INTRAVENOUS EVERY 30 MIN PRN
Status: CANCELLED | OUTPATIENT
Start: 2024-03-12

## 2024-03-12 RX ORDER — SODIUM CHLORIDE 9 MG/ML
INJECTION, SOLUTION INTRAVENOUS PRN
Status: DISCONTINUED | OUTPATIENT
Start: 2024-03-12 | End: 2024-03-13

## 2024-03-12 RX ORDER — LACTULOSE 10 G/15ML
20 SOLUTION ORAL
Status: DISCONTINUED | OUTPATIENT
Start: 2024-03-12 | End: 2024-03-13

## 2024-03-12 RX ORDER — PHYTONADIONE 5 MG/1
10 TABLET ORAL DAILY
Status: DISCONTINUED | OUTPATIENT
Start: 2024-03-12 | End: 2024-03-13

## 2024-03-12 RX ORDER — SODIUM CHLORIDE 0.9 % (FLUSH) 0.9 %
5-40 SYRINGE (ML) INJECTION PRN
Status: DISCONTINUED | OUTPATIENT
Start: 2024-03-12 | End: 2024-03-13

## 2024-03-12 RX ORDER — NOREPINEPHRINE BITARTRATE 0.06 MG/ML
INJECTION, SOLUTION INTRAVENOUS
Status: COMPLETED
Start: 2024-03-12 | End: 2024-03-12

## 2024-03-12 RX ORDER — FOLIC ACID 5 MG/ML
1 INJECTION, SOLUTION INTRAMUSCULAR; INTRAVENOUS; SUBCUTANEOUS DAILY
Status: DISCONTINUED | OUTPATIENT
Start: 2024-03-12 | End: 2024-03-13

## 2024-03-12 RX ORDER — FENTANYL CITRATE-0.9 % NACL/PF 10 MCG/ML
25-200 PLASTIC BAG, INJECTION (ML) INTRAVENOUS CONTINUOUS
Status: CANCELLED | OUTPATIENT
Start: 2024-03-12

## 2024-03-12 RX ORDER — KETAMINE HCL IN NACL, ISO-OSM 100MG/10ML
150 SYRINGE (ML) INJECTION ONCE
Status: DISCONTINUED | OUTPATIENT
Start: 2024-03-12 | End: 2024-03-12

## 2024-03-12 RX ORDER — 0.9 % SODIUM CHLORIDE 0.9 %
300 INTRAVENOUS SOLUTION INTRAVENOUS ONCE
Status: DISCONTINUED | OUTPATIENT
Start: 2024-03-12 | End: 2024-03-12

## 2024-03-12 RX ORDER — MAGNESIUM SULFATE 1 G/100ML
1000 INJECTION INTRAVENOUS PRN
Status: ACTIVE | OUTPATIENT
Start: 2024-03-12 | End: 2024-03-12

## 2024-03-12 RX ORDER — ONDANSETRON 4 MG/1
4 TABLET, ORALLY DISINTEGRATING ORAL EVERY 8 HOURS PRN
Status: DISCONTINUED | OUTPATIENT
Start: 2024-03-12 | End: 2024-03-12 | Stop reason: CLARIF

## 2024-03-12 RX ORDER — ACETAMINOPHEN 650 MG/1
650 SUPPOSITORY RECTAL EVERY 6 HOURS PRN
Status: DISCONTINUED | OUTPATIENT
Start: 2024-03-12 | End: 2024-03-13

## 2024-03-12 RX ORDER — FENTANYL CITRATE-0.9 % NACL/PF 10 MCG/ML
25-200 PLASTIC BAG, INJECTION (ML) INTRAVENOUS CONTINUOUS
Status: DISCONTINUED | OUTPATIENT
Start: 2024-03-12 | End: 2024-03-12 | Stop reason: SDUPTHER

## 2024-03-12 RX ORDER — ROCURONIUM BROMIDE 10 MG/ML
100 INJECTION, SOLUTION INTRAVENOUS ONCE
Status: CANCELLED | OUTPATIENT
Start: 2024-03-12 | End: 2024-03-12

## 2024-03-12 RX ORDER — GAUZE BANDAGE 2" X 2"
100 BANDAGE TOPICAL DAILY
Status: DISCONTINUED | OUTPATIENT
Start: 2024-03-22 | End: 2024-03-12 | Stop reason: CLARIF

## 2024-03-12 RX ORDER — DEXTROSE MONOHYDRATE 100 MG/ML
INJECTION, SOLUTION INTRAVENOUS
Status: DISPENSED
Start: 2024-03-12 | End: 2024-03-12

## 2024-03-12 RX ORDER — LEVETIRACETAM 500 MG/1
500 TABLET ORAL DAILY
Status: DISCONTINUED | OUTPATIENT
Start: 2024-03-12 | End: 2024-03-13

## 2024-03-12 RX ORDER — LANOLIN ALCOHOL/MO/W.PET/CERES
100 CREAM (GRAM) TOPICAL 3 TIMES DAILY
Status: DISCONTINUED | OUTPATIENT
Start: 2024-03-15 | End: 2024-03-13

## 2024-03-12 RX ORDER — THIAMINE HYDROCHLORIDE 100 MG/ML
250 INJECTION, SOLUTION INTRAMUSCULAR; INTRAVENOUS DAILY
Status: DISCONTINUED | OUTPATIENT
Start: 2024-03-12 | End: 2024-03-12 | Stop reason: CLARIF

## 2024-03-12 RX ORDER — 0.9 % SODIUM CHLORIDE 0.9 %
500 INTRAVENOUS SOLUTION INTRAVENOUS ONCE
Status: COMPLETED | OUTPATIENT
Start: 2024-03-12 | End: 2024-03-12

## 2024-03-12 RX ORDER — FENTANYL CITRATE-0.9 % NACL/PF 10 MCG/ML
PLASTIC BAG, INJECTION (ML) INTRAVENOUS
Status: DISCONTINUED
Start: 2024-03-12 | End: 2024-03-13

## 2024-03-12 RX ORDER — PROCHLORPERAZINE MALEATE 10 MG
10 TABLET ORAL EVERY 8 HOURS PRN
Status: DISCONTINUED | OUTPATIENT
Start: 2024-03-12 | End: 2024-03-13

## 2024-03-12 RX ORDER — ONDANSETRON 2 MG/ML
4 INJECTION INTRAMUSCULAR; INTRAVENOUS EVERY 6 HOURS PRN
Status: DISCONTINUED | OUTPATIENT
Start: 2024-03-12 | End: 2024-03-12 | Stop reason: CLARIF

## 2024-03-12 RX ORDER — DEXTROSE MONOHYDRATE 25 G/50ML
INJECTION, SOLUTION INTRAVENOUS
Status: COMPLETED
Start: 2024-03-12 | End: 2024-03-12

## 2024-03-12 RX ORDER — 0.9 % SODIUM CHLORIDE 0.9 %
1000 INTRAVENOUS SOLUTION INTRAVENOUS ONCE
Status: COMPLETED | OUTPATIENT
Start: 2024-03-12 | End: 2024-03-12

## 2024-03-12 RX ORDER — 0.9 % SODIUM CHLORIDE 0.9 %
1000 INTRAVENOUS SOLUTION INTRAVENOUS
Status: DISCONTINUED | OUTPATIENT
Start: 2024-03-12 | End: 2024-03-13

## 2024-03-12 RX ORDER — ROCURONIUM BROMIDE 10 MG/ML
100 INJECTION, SOLUTION INTRAVENOUS ONCE
Status: COMPLETED | OUTPATIENT
Start: 2024-03-12 | End: 2024-03-12

## 2024-03-12 RX ORDER — SODIUM CHLORIDE 0.9 % (FLUSH) 0.9 %
5-40 SYRINGE (ML) INJECTION EVERY 12 HOURS SCHEDULED
Status: DISCONTINUED | OUTPATIENT
Start: 2024-03-12 | End: 2024-03-13

## 2024-03-12 RX ORDER — DEXTROSE MONOHYDRATE 100 MG/ML
INJECTION, SOLUTION INTRAVENOUS CONTINUOUS PRN
Status: DISCONTINUED | OUTPATIENT
Start: 2024-03-12 | End: 2024-03-13

## 2024-03-12 RX ORDER — FENTANYL CITRATE 50 UG/ML
INJECTION, SOLUTION INTRAMUSCULAR; INTRAVENOUS
Status: COMPLETED
Start: 2024-03-12 | End: 2024-03-12

## 2024-03-12 RX ORDER — GLUCAGON 1 MG/ML
1 KIT INJECTION PRN
Status: DISCONTINUED | OUTPATIENT
Start: 2024-03-12 | End: 2024-03-13

## 2024-03-12 RX ORDER — POLYETHYLENE GLYCOL 3350 17 G/17G
17 POWDER, FOR SOLUTION ORAL DAILY PRN
Status: DISCONTINUED | OUTPATIENT
Start: 2024-03-12 | End: 2024-03-13

## 2024-03-12 RX ORDER — PROCHLORPERAZINE EDISYLATE 5 MG/ML
10 INJECTION INTRAMUSCULAR; INTRAVENOUS EVERY 6 HOURS PRN
Status: DISCONTINUED | OUTPATIENT
Start: 2024-03-12 | End: 2024-03-13

## 2024-03-12 RX ORDER — ALBUMIN (HUMAN) 12.5 G/50ML
25 SOLUTION INTRAVENOUS EVERY 8 HOURS
Status: DISCONTINUED | OUTPATIENT
Start: 2024-03-12 | End: 2024-03-13

## 2024-03-12 RX ORDER — NOREPINEPHRINE BITARTRATE 0.06 MG/ML
1-100 INJECTION, SOLUTION INTRAVENOUS CONTINUOUS
Status: DISCONTINUED | OUTPATIENT
Start: 2024-03-12 | End: 2024-03-13

## 2024-03-12 RX ORDER — 0.9 % SODIUM CHLORIDE 0.9 %
1000 INTRAVENOUS SOLUTION INTRAVENOUS ONCE
Status: COMPLETED | OUTPATIENT
Start: 2024-03-12 | End: 2024-03-13

## 2024-03-12 RX ORDER — FENTANYL CITRATE-0.9 % NACL/PF 10 MCG/ML
25-200 PLASTIC BAG, INJECTION (ML) INTRAVENOUS CONTINUOUS
Status: DISCONTINUED | OUTPATIENT
Start: 2024-03-12 | End: 2024-03-13

## 2024-03-12 RX ORDER — LANOLIN ALCOHOL/MO/W.PET/CERES
100 CREAM (GRAM) TOPICAL DAILY
Status: DISCONTINUED | OUTPATIENT
Start: 2024-03-23 | End: 2024-03-13

## 2024-03-12 RX ORDER — KETAMINE HYDROCHLORIDE 10 MG/ML
150 INJECTION, SOLUTION INTRAMUSCULAR; INTRAVENOUS ONCE
Status: CANCELLED | OUTPATIENT
Start: 2024-03-12 | End: 2024-03-12

## 2024-03-12 RX ORDER — SODIUM CHLORIDE 9 MG/ML
INJECTION, SOLUTION INTRAVENOUS PRN
Status: DISCONTINUED | OUTPATIENT
Start: 2024-03-12 | End: 2024-03-12

## 2024-03-12 RX ORDER — CALCIUM GLUCONATE 94 MG/ML
1000 INJECTION, SOLUTION INTRAVENOUS ONCE
Status: DISCONTINUED | OUTPATIENT
Start: 2024-03-12 | End: 2024-03-12 | Stop reason: SDUPTHER

## 2024-03-12 RX ORDER — SODIUM CHLORIDE 9 MG/ML
INJECTION, SOLUTION INTRAVENOUS CONTINUOUS
Status: DISCONTINUED | OUTPATIENT
Start: 2024-03-12 | End: 2024-03-13

## 2024-03-12 RX ORDER — GAUZE BANDAGE 2" X 2"
100 BANDAGE TOPICAL 3 TIMES DAILY
Status: DISCONTINUED | OUTPATIENT
Start: 2024-03-15 | End: 2024-03-12 | Stop reason: CLARIF

## 2024-03-12 RX ORDER — CALCIUM GLUCONATE 10 MG/ML
1000 INJECTION, SOLUTION INTRAVENOUS PRN
Status: DISCONTINUED | OUTPATIENT
Start: 2024-03-12 | End: 2024-03-13

## 2024-03-12 RX ORDER — ANTICOAGULANT SODIUM CITRATE SOLUTION 4 G/100ML
3 SOLUTION INTRAVENOUS
Status: DISCONTINUED | OUTPATIENT
Start: 2024-03-12 | End: 2024-03-13

## 2024-03-12 RX ORDER — ACETAMINOPHEN 325 MG/1
650 TABLET ORAL EVERY 6 HOURS PRN
Status: DISCONTINUED | OUTPATIENT
Start: 2024-03-12 | End: 2024-03-13

## 2024-03-12 RX ORDER — LACTULOSE 10 G/15ML
20 SOLUTION ORAL ONCE
Status: DISCONTINUED | OUTPATIENT
Start: 2024-03-12 | End: 2024-03-12

## 2024-03-12 RX ORDER — CALCIUM GLUCONATE 10 MG/ML
1000 INJECTION, SOLUTION INTRAVENOUS ONCE
Status: COMPLETED | OUTPATIENT
Start: 2024-03-12 | End: 2024-03-12

## 2024-03-12 RX ORDER — POTASSIUM CHLORIDE 29.8 MG/ML
20 INJECTION INTRAVENOUS PRN
Status: ACTIVE | OUTPATIENT
Start: 2024-03-12 | End: 2024-03-12

## 2024-03-12 RX ORDER — IPRATROPIUM BROMIDE AND ALBUTEROL SULFATE 2.5; .5 MG/3ML; MG/3ML
1 SOLUTION RESPIRATORY (INHALATION)
Status: DISCONTINUED | OUTPATIENT
Start: 2024-03-12 | End: 2024-03-13

## 2024-03-12 RX ADMIN — HYDROCORTISONE SODIUM SUCCINATE 100 MG: 100 INJECTION, POWDER, FOR SOLUTION INTRAMUSCULAR; INTRAVENOUS at 15:03

## 2024-03-12 RX ADMIN — Medication: at 23:10

## 2024-03-12 RX ADMIN — SODIUM CHLORIDE 1000 ML: 9 INJECTION, SOLUTION INTRAVENOUS at 11:00

## 2024-03-12 RX ADMIN — SODIUM CHLORIDE, PRESERVATIVE FREE 10 ML: 5 INJECTION INTRAVENOUS at 20:03

## 2024-03-12 RX ADMIN — IOPAMIDOL 75 ML: 755 INJECTION, SOLUTION INTRAVENOUS at 14:13

## 2024-03-12 RX ADMIN — SODIUM CHLORIDE 1000 ML: 9 INJECTION, SOLUTION INTRAVENOUS at 14:57

## 2024-03-12 RX ADMIN — EPINEPHRINE 1 MCG/MIN: 1 INJECTION INTRAMUSCULAR; INTRAVENOUS; SUBCUTANEOUS at 21:48

## 2024-03-12 RX ADMIN — PANTOPRAZOLE SODIUM 80 MG: 40 INJECTION, POWDER, FOR SOLUTION INTRAVENOUS at 12:05

## 2024-03-12 RX ADMIN — SODIUM CHLORIDE 1000 ML: 9 INJECTION, SOLUTION INTRAVENOUS at 22:48

## 2024-03-12 RX ADMIN — METHYLENE BLUE 165 MG: 10 INJECTION INTRAVENOUS at 23:13

## 2024-03-12 RX ADMIN — SODIUM BICARBONATE 50 MEQ: 84 INJECTION INTRAVENOUS at 19:51

## 2024-03-12 RX ADMIN — VANCOMYCIN HYDROCHLORIDE 1750 MG: 10 INJECTION, POWDER, LYOPHILIZED, FOR SOLUTION INTRAVENOUS at 12:56

## 2024-03-12 RX ADMIN — FOLIC ACID 1 MG: 5 INJECTION, SOLUTION INTRAMUSCULAR; INTRAVENOUS; SUBCUTANEOUS at 19:54

## 2024-03-12 RX ADMIN — LACTULOSE 20 G: 20 SOLUTION ORAL at 21:36

## 2024-03-12 RX ADMIN — FENTANYL CITRATE 100 MCG: 50 INJECTION INTRAMUSCULAR; INTRAVENOUS at 15:34

## 2024-03-12 RX ADMIN — VASOPRESSIN 0.03 UNITS/MIN: 20 INJECTION INTRAVENOUS at 15:10

## 2024-03-12 RX ADMIN — SODIUM CHLORIDE: 9 INJECTION, SOLUTION INTRAVENOUS at 21:30

## 2024-03-12 RX ADMIN — DEXTROSE MONOHYDRATE 50 ML: 25 INJECTION, SOLUTION INTRAVENOUS at 10:15

## 2024-03-12 RX ADMIN — ROCURONIUM BROMIDE 100 MG: 10 INJECTION, SOLUTION INTRAVENOUS at 15:30

## 2024-03-12 RX ADMIN — SODIUM CHLORIDE 500 ML: 9 INJECTION, SOLUTION INTRAVENOUS at 10:50

## 2024-03-12 RX ADMIN — Medication 50 MCG/MIN: at 19:20

## 2024-03-12 RX ADMIN — SODIUM CHLORIDE 1000 ML: 9 INJECTION, SOLUTION INTRAVENOUS at 19:39

## 2024-03-12 RX ADMIN — SODIUM CHLORIDE 500 ML: 9 INJECTION, SOLUTION INTRAVENOUS at 17:38

## 2024-03-12 RX ADMIN — CALCIUM GLUCONATE 1000 MG: 10 INJECTION, SOLUTION INTRAVENOUS at 13:02

## 2024-03-12 RX ADMIN — Medication 5 MCG/MIN: at 11:17

## 2024-03-12 RX ADMIN — SODIUM CHLORIDE, PRESERVATIVE FREE 40 MG: 5 INJECTION INTRAVENOUS at 22:21

## 2024-03-12 RX ADMIN — ALBUMIN (HUMAN) 25 G: 0.25 INJECTION, SOLUTION INTRAVENOUS at 19:57

## 2024-03-12 RX ADMIN — CALCIUM CHLORIDE INJECTION 8000 MG: 100 INJECTION, SOLUTION INTRAVENOUS at 21:26

## 2024-03-12 RX ADMIN — Medication 150 MG: at 15:30

## 2024-03-12 RX ADMIN — MEROPENEM 1000 MG: 1 INJECTION, POWDER, FOR SOLUTION INTRAVENOUS at 12:13

## 2024-03-12 RX ADMIN — PHYTONADIONE 10 MG: 5 TABLET ORAL at 21:36

## 2024-03-12 RX ADMIN — Medication: at 21:08

## 2024-03-12 RX ADMIN — PHENYLEPHRINE HYDROCHLORIDE 50 MG: 10 INJECTION INTRAVENOUS at 22:51

## 2024-03-12 RX ADMIN — IPRATROPIUM BROMIDE AND ALBUTEROL SULFATE 1 DOSE: 2.5; .5 SOLUTION RESPIRATORY (INHALATION) at 21:48

## 2024-03-12 RX ADMIN — SODIUM BICARBONATE: 84 INJECTION, SOLUTION INTRAVENOUS at 12:02

## 2024-03-12 RX ADMIN — HYDROCORTISONE SODIUM SUCCINATE 50 MG: 100 INJECTION, POWDER, FOR SOLUTION INTRAMUSCULAR; INTRAVENOUS at 20:01

## 2024-03-12 RX ADMIN — THIAMINE HYDROCHLORIDE 250 MG: 100 INJECTION, SOLUTION INTRAMUSCULAR; INTRAVENOUS at 19:59

## 2024-03-12 RX ADMIN — Medication 100 MCG/MIN: at 22:38

## 2024-03-12 RX ADMIN — SODIUM BICARBONATE 50 MEQ: 84 INJECTION INTRAVENOUS at 19:49

## 2024-03-12 ASSESSMENT — PULMONARY FUNCTION TESTS
PIF_VALUE: 21
PIF_VALUE: 21
PIF_VALUE: 19
PIF_VALUE: 20
PIF_VALUE: 19
PIF_VALUE: 20
PIF_VALUE: 20
PIF_VALUE: 21
PIF_VALUE: 19
PIF_VALUE: 19
PIF_VALUE: 20

## 2024-03-12 ASSESSMENT — PAIN SCALES - GENERAL
PAINLEVEL_OUTOF10: 0
PAINLEVEL_OUTOF10: 0

## 2024-03-12 NOTE — PROGRESS NOTES
4 Eyes Skin Assessment     NAME:  Shahzad Rodgers  YOB: 1955  MEDICAL RECORD NUMBER:  [id number]    The patient is being assessed for  Admission    I agree that at least one RN has performed a thorough Head to Toe Skin Assessment on the patient. ALL assessment sites listed below have been assessed.      Areas assessed by both nurses:    Head, Face, Ears, Shoulders, Back, Chest, Arms, Elbows, Hands, Sacrum. Buttock, Coccyx, Ischium, Legs. Feet and Heels, and Under Medical Devices         Does the Patient have a Wound? Yes wound(s) were present on assessment. LDA wound assessment was Initiated and completed by RN       Brian Prevention initiated by RN: Yes  Wound Care Orders initiated by RN: Yes    Pressure Injury (Stage 3,4, Unstageable, DTI, NWPT, and Complex wounds) if present, place Wound referral order by RN under : Yes    New Ostomies, if present place, Ostomy referral order under : No     Nurse 1 eSignature: Electronically signed by Honey Koroma RN on 3/12/24 at 5:20 PM EDT    **SHARE this note so that the co-signing nurse can place an eSignature**    Nurse 2 eSignature: Electronically signed by Eduardo Rodríguez RN on 3/12/24 at 5:21 PM EDT

## 2024-03-12 NOTE — PROCEDURES
Arterial line placement    Procedure: Left Femoral arterial line placement.     Indications: Continuous monitoring of blood pressure in a patient with hypotension +/- shock, on Levophed.     Anesthesia: Local infiltration of 1% lidocaine.     Consent:  Unable to be obtained due to the emergent nature of this procedure.    Technique: Time Out: Immediately prior to the procedure a \"timeout\" was called to verify the correct patient and procedure. Procedure was done using strict aseptic technique. Jean's test was performed and was normal. Left Femoral site was cleaned with chloraprep and draped. Left Femoral was identified, then Lidocaine 1% was infiltrated locally.  Arterial line was inserted, a good blood flow was obtained, after which guidewire was inserted all the way with no resistance. Then the canula was inserted and needle with guidewire was withdrawn. Pulsatile bright red blood flow was observed. The canula was connected to BP monitoring apparatus and a good quality waveform was noted. Then the canula was secured with 2 stay sutures of 2-0 silk after Lidocaine infiltration, following which dressing was applied.     Number of sticks: 1.     Number of Kits used: 1.     Complications: No immediate complication.      Estimated blood loss: About 1 ml.     Comment: Patient tolerated the procedure well.     Raymundo Bloom MD PGY-3  3/12/2024 6:59 PM

## 2024-03-12 NOTE — PROGRESS NOTES
Pharmacy Consultation Note  (Antibiotic Dosing and Monitoring)    Initial consult date: 3/12/2024  Consulting physician/provider: Latisha Carlisle MD   Drug: Vancomycin  Indication: Empiric/Septic shock     Age/  Gender Height Weight IBW  Allergy Information   68 y.o./male  180.3 cm 82.3 kg (181 lb 7 oz)     Ideal body weight: 75.3 kg (166 lb 0.1 oz)  Adjusted ideal body weight: 78.1 kg (172 lb 2.9 oz)   Penicillins      Renal Function:  Recent Labs     03/12/24  1023 03/12/24  1100   BUN 43*  --    CREATININE 7.4* 8.6*       Intake/Output Summary (Last 24 hours) at 3/12/2024 1804  Last data filed at 3/12/2024 1800  Gross per 24 hour   Intake --   Output 200 ml   Net -200 ml     Vancomycin Administration Times:  Recent vancomycin administrations                     vancomycin (VANCOCIN) 1,750 mg in sodium chloride 0.9 % 500 mL IVPB (mg) 1,750 mg New Bag 03/12/24 1256                    Assessment:  Patient is a 68 y.o. male who has been initiated on vancomycin  History of hepatic encephalopathy, EtOH liver disease.   Estimated Creatinine Clearance: 9 mL/min (A) (based on SCr of 8.6 mg/dL (HH)).  To dose vancomycin, pharmacy will be dosing based off of levels because of patient's renal impairment/insufficiency  Vanco 1750mg x1 ordered in the ED at 12:56    Plan:  Vanco level ordered for tomorrow (3/13) at 12:00  Will re-dose as needed.   Will continue to monitor renal function   Pharmacy to follow      Elzbieta David, PharmD, BCIDP, BCPS 3/12/2024 6:08 PM  [phone]

## 2024-03-12 NOTE — PROGRESS NOTES
Renal Dose Adjustment Policy (Generic)     This patient is on medication that requires renal, weight, and/or indication dose adjustment.      Date Body Weight IBW  Adjusted BW SCr  CrCl Dialysis status   3/12/2024 82.3 kg (181 lb 7 oz) Ideal body weight: 75.3 kg (166 lb 0.1 oz)  Adjusted ideal body weight: 78.1 kg (172 lb 2.9 oz) Serum creatinine: 8.6 mg/dL (HH) 03/12/24 1100  Estimated creatinine clearance: 9 mL/min (A)        Pharmacy has dose-adjusted the following medication(s):    Date Previous Order Adjusted Order   3/12/2024 Zofran 4mg  Compazine 10mg        These changes were made per protocol according to the [facility]   Automatic Renal Dose Adjustment Policy.     *Please note this dose may need readjusted if patient's condition changes.    Please contact pharmacy with any questions regarding these changes.    Jil Sweeney RPH  3/12/2024  5:27 PM

## 2024-03-12 NOTE — ED NOTES
Returned from CT.  Eyes closed but conversing intermittently. Mouth breathing and noted large amount of dried blood to lips. Lips washed with washcloth.  Life vest reapplied and entire linen changed with pericare given.  Sacral area has small skin tear noted and entire sacrum reddened.  Meplilex ordered from central to apply. Rectal temp obtained and rectal probe inserted to monitor temp.  Irma hugger applied.  Pt c/o needing to void and observed pierce draining scant amount of bloody urine.  Irrigated with sterile saline total of 150cc.  CBI re-initiated and now infusing without difficulty and slightly pink bloody urine return is noted .Cath secure applied.

## 2024-03-12 NOTE — PROGRESS NOTES
Radiology Procedure Waiver   Name: Shahzad Rodgers  : 1955  MRN: [id number]    Date:  3/12/24    Time: 12:37 PM EDT    Benefits of immediately proceeding with radiology exam(s) without pre-testing outweigh the risks or are not indicated as specified below and therefore the following is/are being waived:    [x] Benefits of immediate radiology exam(s) outweigh any risk.                                               OR    Pre-exam testing is not indicated for the following reason(s):  [] Pregnancy test   [] Patients LMP on-time and regular.   [] Patient had Tubal Ligation or has other Contraception Device.   [] Patient  is Menopausal or Premenarcheal.    [] Patient had Full or Partial Hysterectomy.    [] Protocol for CT contrast allegry   [] Patient has tolerated well previously   [] Patient does not have a true allergy    [] MRI Questionnaire     [] BUN/Creatinine   [] Patient age w/no hx of renal dysfunction.   [] Patient on Dialysis.   [] Recent Normal Labs.  Electronically signed by Nile Amos DO on 3/12/24 at 12:37 PM EDT

## 2024-03-12 NOTE — ED PROVIDER NOTES
sodium chloride 0.9% 100 mL infusion (100 mcg/hr IntraVENous Restarted 3/12/24 1651)   levETIRAcetam (KEPPRA) tablet 500 mg (0 mg Oral Held 3/12/24 1725)   albumin human 25% IV solution 25 g (25 g IntraVENous New Bag 3/13/24 0032)   hydrocortisone sodium succinate PF (SOLU-CORTEF) injection 50 mg (50 mg IntraVENous Given 3/13/24 0033)   ipratropium 0.5 mg-albuterol 2.5 mg (DUONEB) nebulizer solution 1 Dose (1 Dose Inhalation Given 3/12/24 2148)   glucose chewable tablet 16 g (has no administration in time range)   dextrose bolus 10% 125 mL (has no administration in time range)     Or   dextrose bolus 10% 250 mL (has no administration in time range)   glucagon injection 1 mg (has no administration in time range)   dextrose 10 % infusion (has no administration in time range)   sodium chloride flush 0.9 % injection 5-40 mL (10 mLs IntraVENous Given 3/12/24 2003)   sodium chloride flush 0.9 % injection 5-40 mL (has no administration in time range)   0.9 % sodium chloride infusion (has no administration in time range)   polyethylene glycol (GLYCOLAX) packet 17 g (has no administration in time range)   acetaminophen (TYLENOL) tablet 650 mg (has no administration in time range)     Or   acetaminophen (TYLENOL) suppository 650 mg (has no administration in time range)   folic acid injection 1 mg (1 mg IntraVENous Given 3/12/24 1954)   meropenem (MERREM) 1,000 mg in sodium chloride 0.9 % 100 mL IVPB (Cfjv0Aid) (has no administration in time range)   thiamine (B-1) 250 mg in sodium chloride 0.9 % 100 mL IVPB (0 mg IntraVENous Stopped 3/12/24 2030)     Followed by   thiamine tablet 100 mg (has no administration in time range)     Followed by   thiamine tablet 100 mg (has no administration in time range)   prochlorperazine (COMPAZINE) tablet 10 mg (has no administration in time range)     Or   prochlorperazine (COMPAZINE) injection 10 mg (has no administration in time range)   vancomycin (VANCOCIN) intermittent dosing

## 2024-03-12 NOTE — PROGRESS NOTES
Chart reviewed full consultation to follow.    Briefly Mr. Rodgers is a 68-year-old male with history of alcoholic cirrhosis, stage II esophageal varices, ascites requiring large-volume paracentesis, prostate cancer status post prostatectomy and radiation, recently admitted on February 17, 2024 with recurrent falls, encephalopathy, anemia, and during his admission patient had cardiac arrest due to polymorphic VT/VF, he was found as well to have NSTEMI and new onset cardiomyopathy with EF of 20-25%, with repeated echo of 35-40%, he underwent a cardiac catheterization and stenting.  During his hospital stay as well he developed gross hematuria likely traumatic due to Macdonald catheter or possibly hemorrhagic cystitis secondary to radiation.  He was discharged on February 29.  Patient was readmitted on March 12, 2023 after he was brought to the ER by EMS from the nursing home where he was found to be altered and having possibly hemoptysis.  In the ER after evaluation he was found to be hypoglycemic with a BGL of 27, severely acidotic with a bicarbonate level of only 5 mEq/L, lactic acid of 23.9 mmol/L, potassium of 5.8 mEq/L, creatinine level of 7.4 mg/dL, reasons for this consultation.  Patient was intubated and was admitted to MICU.  His baseline creatinine level is 0.5-0.6 mg/dL.  His medications prior to admission included furosemide, Entresto, potassium.    BONNY stage III, likely ischemic ATN, presently with hyperkalemia and severe metabolic acidosis.  He is presently hemodynamically unstable, we will proceed with CRRT.  Hyperkalemia, multifactorial, 2/2 #1, valsartan and potassium supplementation  Severe metabolic acidosis, pH: 6.909, bicarb level of 5, lactic acid of 23.9  Hemodynamic shock, hypovolemic and possibly septic shock, and meropenem and vancomycin  Alcoholic cirrhosis  Recurrent ascites, requiring large-volume paracentesis  Macrocytic anemia  Respiratory failure, status post intubation  Hypoglycemia,

## 2024-03-13 ENCOUNTER — APPOINTMENT (OUTPATIENT)
Dept: GENERAL RADIOLOGY | Age: 69
DRG: 871 | End: 2024-03-13
Payer: COMMERCIAL

## 2024-03-13 VITALS
OXYGEN SATURATION: 93 % | SYSTOLIC BLOOD PRESSURE: 115 MMHG | RESPIRATION RATE: 26 BRPM | TEMPERATURE: 97.2 F | DIASTOLIC BLOOD PRESSURE: 45 MMHG | WEIGHT: 181.44 LBS | BODY MASS INDEX: 25.31 KG/M2

## 2024-03-13 LAB
AADO2: 204.2 MMHG
AADO2: 225.7 MMHG
AADO2: 290.7 MMHG
AADO2: 304.3 MMHG
ALBUMIN SERPL-MCNC: 2.3 G/DL (ref 3.5–5.2)
ALBUMIN SERPL-MCNC: 2.6 G/DL (ref 3.5–5.2)
ALBUMIN SERPL-MCNC: 2.7 G/DL (ref 3.5–5.2)
ALP SERPL-CCNC: 300 U/L (ref 40–129)
ALP SERPL-CCNC: 349 U/L (ref 40–129)
ALP SERPL-CCNC: 360 U/L (ref 40–129)
ALT SERPL-CCNC: 380 U/L (ref 0–40)
ALT SERPL-CCNC: 493 U/L (ref 0–40)
ALT SERPL-CCNC: 501 U/L (ref 0–40)
ANION GAP SERPL CALCULATED.3IONS-SCNC: 32 MMOL/L (ref 7–16)
ANION GAP SERPL CALCULATED.3IONS-SCNC: 33 MMOL/L (ref 7–16)
AST SERPL-CCNC: 2065 U/L (ref 0–39)
AST SERPL-CCNC: 2891 U/L (ref 0–39)
AST SERPL-CCNC: 2899 U/L (ref 0–39)
B.E.: -0.3 MMOL/L (ref -3–3)
B.E.: -16.7 MMOL/L (ref -3–3)
B.E.: -19.1 MMOL/L (ref -3–3)
B.E.: -20.2 MMOL/L (ref -3–3)
BILIRUB DIRECT SERPL-MCNC: 1.7 MG/DL (ref 0–0.3)
BILIRUB INDIRECT SERPL-MCNC: 1 MG/DL (ref 0–1)
BILIRUB SERPL-MCNC: 2.7 MG/DL (ref 0–1.2)
BILIRUB SERPL-MCNC: 2.7 MG/DL (ref 0–1.2)
BILIRUB SERPL-MCNC: 3 MG/DL (ref 0–1.2)
BUN SERPL-MCNC: 22 MG/DL (ref 6–23)
BUN SERPL-MCNC: 31 MG/DL (ref 6–23)
CA-I BLD-SCNC: 0.83 MMOL/L (ref 1.15–1.33)
CA-I BLD-SCNC: 0.85 MMOL/L (ref 1.15–1.33)
CALCIUM SERPL-MCNC: 5.7 MG/DL (ref 8.6–10.2)
CALCIUM SERPL-MCNC: 6.4 MG/DL (ref 8.6–10.2)
CHLORIDE SERPL-SCNC: 97 MMOL/L (ref 98–107)
CHLORIDE SERPL-SCNC: 98 MMOL/L (ref 98–107)
CLOT ANGLE.KAOLIN INDUCED BLD RES TEG: 41.9 DEG (ref 53–70)
CO2 SERPL-SCNC: 10 MMOL/L (ref 22–29)
CO2 SERPL-SCNC: 9 MMOL/L (ref 22–29)
COHB: 0.2 % (ref 0–1.5)
COHB: 0.4 % (ref 0–1.5)
COHB: 0.4 % (ref 0–1.5)
COHB: 1 % (ref 0–1.5)
COMMENT: ABNORMAL
CREAT SERPL-MCNC: 3.9 MG/DL (ref 0.7–1.2)
CREAT SERPL-MCNC: 5.2 MG/DL (ref 0.7–1.2)
CRITICAL: ABNORMAL
DATE ANALYZED: ABNORMAL
DATE LAST DOSE: NORMAL
DATE OF COLLECTION: ABNORMAL
EPL-TEG: 70 % (ref 0–15)
FIBRINOGEN PPP-MCNC: 77 MG/DL (ref 200–400)
FIO2: 55 %
FIO2: 60 %
G-TEG: 1.7 KDYN/CM2 (ref 4.5–11)
GFR SERPL CREATININE-BSD FRML MDRD: 12 ML/MIN/1.73M2
GFR SERPL CREATININE-BSD FRML MDRD: 16 ML/MIN/1.73M2
GLUCOSE BLD-MCNC: 136 MG/DL (ref 74–99)
GLUCOSE SERPL-MCNC: 126 MG/DL (ref 74–99)
GLUCOSE SERPL-MCNC: 147 MG/DL (ref 74–99)
HCO3: 11.6 MMOL/L (ref 22–26)
HCO3: 24.9 MMOL/L (ref 22–26)
HCO3: 8 MMOL/L (ref 22–26)
HCO3: 8.7 MMOL/L (ref 22–26)
HCT VFR BLD AUTO: 22 % (ref 37–54)
HCT VFR BLD AUTO: 22.1 % (ref 37–54)
HCT VFR BLD AUTO: 22.2 % (ref 37–54)
HGB BLD-MCNC: 6.5 G/DL (ref 12.5–16.5)
HGB BLD-MCNC: 6.6 G/DL (ref 12.5–16.5)
HGB BLD-MCNC: 6.7 G/DL (ref 12.5–16.5)
HHB: 1.8 % (ref 0–5)
HHB: 2.6 % (ref 0–5)
HHB: 25.5 % (ref 0–5)
HHB: 7.6 % (ref 0–5)
INR PPP: 3.7
INR PPP: 3.9
KINETICS TEG: 4.6 MIN (ref 1–3)
LAB: ABNORMAL
LACTATE BLDV-SCNC: 19.6 MMOL/L (ref 0.5–2.2)
LACTATE BLDV-SCNC: 20.2 MMOL/L (ref 0.5–2.2)
LACTATE BLDV-SCNC: 20.9 MMOL/L (ref 0.5–2.2)
LACTATE BLDV-SCNC: 21.6 MMOL/L (ref 0.5–2.2)
LY30 (LYSIS) TEG: 70 % (ref 0–8)
Lab: 1015
Lab: 613
Lab: 746
Lab: 809
MA (MAX CLOT) TEG: 24.9 MM (ref 50–70)
MAGNESIUM SERPL-MCNC: 2 MG/DL (ref 1.6–2.6)
METHB: 0.8 % (ref 0–1.5)
METHB: 0.8 % (ref 0–1.5)
METHB: 0.9 % (ref 0–1.5)
METHB: 0.9 % (ref 0–1.5)
MODE: AC
O2 CONTENT: 10.6 ML/DL
O2 CONTENT: 11.1 ML/DL
O2 CONTENT: 8.3 ML/DL
O2 CONTENT: 9.6 ML/DL
O2 SATURATION: 74.2 % (ref 92–98.5)
O2 SATURATION: 92.3 % (ref 92–98.5)
O2 SATURATION: 97.4 % (ref 92–98.5)
O2 SATURATION: 98.2 % (ref 92–98.5)
O2HB: 73.2 % (ref 94–97)
O2HB: 90.5 % (ref 94–97)
O2HB: 96.2 % (ref 94–97)
O2HB: 97.2 % (ref 94–97)
OPERATOR ID: 1768
OPERATOR ID: 2067
OPERATOR ID: 7292
OPERATOR ID: 8214
PARTIAL THROMBOPLASTIN TIME: 91.9 SEC (ref 24.5–35.1)
PATIENT TEMP: 37 C
PCO2: 26.9 MMHG (ref 35–45)
PCO2: 27.6 MMHG (ref 35–45)
PCO2: 38 MMHG (ref 35–45)
PCO2: 43.3 MMHG (ref 35–45)
PEEP/CPAP: 8 CMH2O
PFO2: 0.83 MMHG/%
PFO2: 1.32 MMHG/%
PFO2: 1.9 MMHG/%
PFO2: 2.61 MMHG/%
PH BLOOD GAS: 7.09 (ref 7.35–7.45)
PH BLOOD GAS: 7.1 (ref 7.35–7.45)
PH BLOOD GAS: 7.12 (ref 7.35–7.45)
PH BLOOD GAS: 7.38 (ref 7.35–7.45)
PHOSPHATE SERPL-MCNC: 8.6 MG/DL (ref 2.5–4.5)
PO2: 104.5 MMHG (ref 75–100)
PO2: 143.5 MMHG (ref 75–100)
PO2: 45.4 MMHG (ref 75–100)
PO2: 78.9 MMHG (ref 75–100)
POTASSIUM SERPL-SCNC: 4.3 MMOL/L (ref 3.5–5)
POTASSIUM SERPL-SCNC: 5.4 MMOL/L (ref 3.5–5)
PROT SERPL-MCNC: 4.6 G/DL (ref 6.4–8.3)
PROTHROMBIN TIME: 40.2 SEC (ref 9.3–12.4)
PROTHROMBIN TIME: 42.5 SEC (ref 9.3–12.4)
REACTION TIME TEG: 11.2 MIN (ref 5–10)
RI(T): 1.42
RI(T): 2.16
RI(T): 3.86
RI(T): 6.4
RR MECHANICAL: 18 B/MIN
RR MECHANICAL: 26 B/MIN
SODIUM SERPL-SCNC: 139 MMOL/L (ref 132–146)
SODIUM SERPL-SCNC: 140 MMOL/L (ref 132–146)
SOURCE, BLOOD GAS: ABNORMAL
THB: 7.4 G/DL (ref 11.5–16.5)
THB: 7.7 G/DL (ref 11.5–16.5)
THB: 7.9 G/DL (ref 11.5–16.5)
THB: 8 G/DL (ref 11.5–16.5)
TIME ANALYZED: 1028
TIME ANALYZED: 617
TIME ANALYZED: 802
TIME ANALYZED: 813
TME LAST DOSE: NORMAL H
VANCOMYCIN DOSE: NORMAL MG
VANCOMYCIN SERPL-MCNC: 11.2 UG/ML (ref 5–40)
VT MECHANICAL: 450 ML

## 2024-03-13 PROCEDURE — 30233N1 TRANSFUSION OF NONAUTOLOGOUS RED BLOOD CELLS INTO PERIPHERAL VEIN, PERCUTANEOUS APPROACH: ICD-10-PCS | Performed by: INTERNAL MEDICINE

## 2024-03-13 PROCEDURE — 80076 HEPATIC FUNCTION PANEL: CPT

## 2024-03-13 PROCEDURE — 2580000003 HC RX 258

## 2024-03-13 PROCEDURE — 85610 PROTHROMBIN TIME: CPT

## 2024-03-13 PROCEDURE — 51798 US URINE CAPACITY MEASURE: CPT

## 2024-03-13 PROCEDURE — 37799 UNLISTED PX VASCULAR SURGERY: CPT

## 2024-03-13 PROCEDURE — 6360000002 HC RX W HCPCS

## 2024-03-13 PROCEDURE — 94003 VENT MGMT INPAT SUBQ DAY: CPT

## 2024-03-13 PROCEDURE — 2500000003 HC RX 250 WO HCPCS

## 2024-03-13 PROCEDURE — 71045 X-RAY EXAM CHEST 1 VIEW: CPT

## 2024-03-13 PROCEDURE — 83605 ASSAY OF LACTIC ACID: CPT

## 2024-03-13 PROCEDURE — P9047 ALBUMIN (HUMAN), 25%, 50ML: HCPCS

## 2024-03-13 PROCEDURE — P9017 PLASMA 1 DONOR FRZ W/IN 8 HR: HCPCS

## 2024-03-13 PROCEDURE — 85347 COAGULATION TIME ACTIVATED: CPT

## 2024-03-13 PROCEDURE — 85390 FIBRINOLYSINS SCREEN I&R: CPT

## 2024-03-13 PROCEDURE — 2580000003 HC RX 258: Performed by: INTERNAL MEDICINE

## 2024-03-13 PROCEDURE — 36415 COLL VENOUS BLD VENIPUNCTURE: CPT

## 2024-03-13 PROCEDURE — 85014 HEMATOCRIT: CPT

## 2024-03-13 PROCEDURE — 85384 FIBRINOGEN ACTIVITY: CPT

## 2024-03-13 PROCEDURE — 86927 PLASMA FRESH FROZEN: CPT

## 2024-03-13 PROCEDURE — 85018 HEMOGLOBIN: CPT

## 2024-03-13 PROCEDURE — C9113 INJ PANTOPRAZOLE SODIUM, VIA: HCPCS

## 2024-03-13 PROCEDURE — 85730 THROMBOPLASTIN TIME PARTIAL: CPT

## 2024-03-13 PROCEDURE — 85576 BLOOD PLATELET AGGREGATION: CPT

## 2024-03-13 PROCEDURE — P9016 RBC LEUKOCYTES REDUCED: HCPCS

## 2024-03-13 PROCEDURE — 83735 ASSAY OF MAGNESIUM: CPT

## 2024-03-13 PROCEDURE — 84100 ASSAY OF PHOSPHORUS: CPT

## 2024-03-13 PROCEDURE — 80053 COMPREHEN METABOLIC PANEL: CPT

## 2024-03-13 PROCEDURE — 82962 GLUCOSE BLOOD TEST: CPT

## 2024-03-13 PROCEDURE — P9012 CRYOPRECIPITATE EACH UNIT: HCPCS

## 2024-03-13 PROCEDURE — 99238 HOSP IP/OBS DSCHRG MGMT 30/<: CPT | Performed by: STUDENT IN AN ORGANIZED HEALTH CARE EDUCATION/TRAINING PROGRAM

## 2024-03-13 PROCEDURE — 6370000000 HC RX 637 (ALT 250 FOR IP)

## 2024-03-13 PROCEDURE — 80202 ASSAY OF VANCOMYCIN: CPT

## 2024-03-13 PROCEDURE — 6370000000 HC RX 637 (ALT 250 FOR IP): Performed by: NURSE PRACTITIONER

## 2024-03-13 PROCEDURE — 2500000003 HC RX 250 WO HCPCS: Performed by: EMERGENCY MEDICINE

## 2024-03-13 PROCEDURE — 36430 TRANSFUSION BLD/BLD COMPNT: CPT

## 2024-03-13 PROCEDURE — 94640 AIRWAY INHALATION TREATMENT: CPT

## 2024-03-13 PROCEDURE — 82805 BLOOD GASES W/O2 SATURATION: CPT

## 2024-03-13 PROCEDURE — 82330 ASSAY OF CALCIUM: CPT

## 2024-03-13 PROCEDURE — 6360000002 HC RX W HCPCS: Performed by: INTERNAL MEDICINE

## 2024-03-13 PROCEDURE — 2500000003 HC RX 250 WO HCPCS: Performed by: INTERNAL MEDICINE

## 2024-03-13 RX ORDER — SODIUM CHLORIDE 9 MG/ML
INJECTION, SOLUTION INTRAVENOUS PRN
Status: DISCONTINUED | OUTPATIENT
Start: 2024-03-13 | End: 2024-03-13

## 2024-03-13 RX ORDER — 0.9 % SODIUM CHLORIDE 0.9 %
500 INTRAVENOUS SOLUTION INTRAVENOUS ONCE
Status: COMPLETED | OUTPATIENT
Start: 2024-03-13 | End: 2024-03-13

## 2024-03-13 RX ORDER — ALBUMIN (HUMAN) 12.5 G/50ML
25 SOLUTION INTRAVENOUS
Status: COMPLETED | OUTPATIENT
Start: 2024-03-13 | End: 2024-03-13

## 2024-03-13 RX ORDER — CHLORHEXIDINE GLUCONATE ORAL RINSE 1.2 MG/ML
15 SOLUTION DENTAL 2 TIMES DAILY
Status: DISCONTINUED | OUTPATIENT
Start: 2024-03-13 | End: 2024-03-13

## 2024-03-13 RX ORDER — THIAMINE HYDROCHLORIDE 100 MG/ML
100 INJECTION, SOLUTION INTRAMUSCULAR; INTRAVENOUS ONCE
Status: COMPLETED | OUTPATIENT
Start: 2024-03-13 | End: 2024-03-13

## 2024-03-13 RX ADMIN — VASOPRESSIN 0.04 UNITS/MIN: 20 INJECTION INTRAVENOUS at 00:10

## 2024-03-13 RX ADMIN — EPINEPHRINE 30 MCG/MIN: 1 INJECTION INTRAMUSCULAR; INTRAVENOUS; SUBCUTANEOUS at 09:16

## 2024-03-13 RX ADMIN — Medication: at 07:02

## 2024-03-13 RX ADMIN — ALBUMIN (HUMAN) 25 G: 0.25 INJECTION, SOLUTION INTRAVENOUS at 00:32

## 2024-03-13 RX ADMIN — THIAMINE HYDROCHLORIDE 100 MG: 100 INJECTION, SOLUTION INTRAMUSCULAR; INTRAVENOUS at 09:03

## 2024-03-13 RX ADMIN — CALCIUM GLUCONATE 1000 MG: 10 INJECTION, SOLUTION INTRAVENOUS at 01:38

## 2024-03-13 RX ADMIN — PHENYLEPHRINE HYDROCHLORIDE 300 MCG/MIN: 10 INJECTION INTRAVENOUS at 09:15

## 2024-03-13 RX ADMIN — CALCIUM GLUCONATE 2000 MG: 98 INJECTION, SOLUTION INTRAVENOUS at 08:43

## 2024-03-13 RX ADMIN — HYDROCORTISONE SODIUM SUCCINATE 50 MG: 100 INJECTION, POWDER, FOR SOLUTION INTRAMUSCULAR; INTRAVENOUS at 05:27

## 2024-03-13 RX ADMIN — FOLIC ACID 1 MG: 5 INJECTION, SOLUTION INTRAMUSCULAR; INTRAVENOUS; SUBCUTANEOUS at 08:26

## 2024-03-13 RX ADMIN — SODIUM CHLORIDE, PRESERVATIVE FREE 40 MG: 5 INJECTION INTRAVENOUS at 07:39

## 2024-03-13 RX ADMIN — Medication: at 03:22

## 2024-03-13 RX ADMIN — SODIUM BICARBONATE 50 MEQ: 84 INJECTION INTRAVENOUS at 08:29

## 2024-03-13 RX ADMIN — PHYTONADIONE 10 MG: 5 TABLET ORAL at 09:19

## 2024-03-13 RX ADMIN — EPINEPHRINE 15 MCG/MIN: 1 INJECTION INTRAMUSCULAR; INTRAVENOUS; SUBCUTANEOUS at 01:57

## 2024-03-13 RX ADMIN — Medication 100 MCG/MIN: at 01:20

## 2024-03-13 RX ADMIN — HYDROCORTISONE SODIUM SUCCINATE 50 MG: 100 INJECTION, POWDER, FOR SOLUTION INTRAMUSCULAR; INTRAVENOUS at 00:33

## 2024-03-13 RX ADMIN — ALBUMIN (HUMAN) 25 G: 0.25 INJECTION, SOLUTION INTRAVENOUS at 07:36

## 2024-03-13 RX ADMIN — Medication 100 MCG/MIN: at 09:03

## 2024-03-13 RX ADMIN — CALCIUM GLUCONATE 1000 MG: 10 INJECTION, SOLUTION INTRAVENOUS at 02:34

## 2024-03-13 RX ADMIN — SODIUM BICARBONATE: 84 INJECTION, SOLUTION INTRAVENOUS at 00:49

## 2024-03-13 RX ADMIN — Medication: at 01:26

## 2024-03-13 RX ADMIN — SODIUM CHLORIDE, PRESERVATIVE FREE 10 ML: 5 INJECTION INTRAVENOUS at 07:39

## 2024-03-13 RX ADMIN — ALBUMIN (HUMAN) 25 G: 0.25 INJECTION, SOLUTION INTRAVENOUS at 08:00

## 2024-03-13 RX ADMIN — SODIUM BICARBONATE 50 MEQ: 84 INJECTION, SOLUTION INTRAVENOUS at 08:13

## 2024-03-13 RX ADMIN — METHYLENE BLUE 123 MG: 10 INJECTION INTRAVENOUS at 09:39

## 2024-03-13 RX ADMIN — SODIUM BICARBONATE: 84 INJECTION, SOLUTION INTRAVENOUS at 07:59

## 2024-03-13 RX ADMIN — SODIUM CHLORIDE: 9 INJECTION, SOLUTION INTRAVENOUS at 06:20

## 2024-03-13 RX ADMIN — Medication 100 MCG/MIN: at 03:51

## 2024-03-13 RX ADMIN — Medication 100 MCG/MIN: at 06:34

## 2024-03-13 RX ADMIN — EPINEPHRINE 20 MCG/MIN: 1 INJECTION INTRAMUSCULAR; INTRAVENOUS; SUBCUTANEOUS at 06:36

## 2024-03-13 RX ADMIN — IPRATROPIUM BROMIDE AND ALBUTEROL SULFATE 1 DOSE: 2.5; .5 SOLUTION RESPIRATORY (INHALATION) at 09:12

## 2024-03-13 RX ADMIN — SODIUM CHLORIDE 500 ML: 9 INJECTION, SOLUTION INTRAVENOUS at 08:13

## 2024-03-13 RX ADMIN — SODIUM BICARBONATE: 84 INJECTION, SOLUTION INTRAVENOUS at 05:41

## 2024-03-13 RX ADMIN — LACTULOSE 20 G: 20 SOLUTION ORAL at 00:36

## 2024-03-13 ASSESSMENT — PULMONARY FUNCTION TESTS
PIF_VALUE: 19
PIF_VALUE: 22
PIF_VALUE: 19
PIF_VALUE: 20
PIF_VALUE: 19
PIF_VALUE: 19
PIF_VALUE: 20
PIF_VALUE: 19
PIF_VALUE: 19
PIF_VALUE: 20
PIF_VALUE: 19
PIF_VALUE: 19
PIF_VALUE: 18
PIF_VALUE: 20
PIF_VALUE: 22
PIF_VALUE: 20
PIF_VALUE: 23
PIF_VALUE: 22

## 2024-03-13 ASSESSMENT — PAIN SCALES - GENERAL
PAINLEVEL_OUTOF10: 0

## 2024-03-13 NOTE — PROGRESS NOTES
Patient admitted to MICU with the following belongings:  Pants, Shirt, and Other life vest with extra batteries . The following belongings admitted with the patient, None, were sent home with the patient's family.

## 2024-03-13 NOTE — PROGRESS NOTES
[facility] consulted for this patient. Made aware of current patient condition and code status.

## 2024-03-13 NOTE — DISCHARGE SUMMARY
[facility] Medicine Discharge Summary    Patient ID: Shahzad Rodgers      Patient's PCP: Fly Sequeira MD    Admitting Physician: Thuy Madsen MD  Discharge Physician: Donn Em MD     Admit Date: 3/12/2024     Disposition:     Discharge Diagnoses:   Principal Problem:    Septic shock (HCC)  Resolved Problems:    * No resolved hospital problems. *      Date of Death:3/13/2024  Time of Death:10:26 AM EST    Immediate Cause of Death:Cardiopulmonary Arrest   Underlying Conditions: Ventricular Fibrillation   Other Contributing Conditions:Septic Shock, BONNY, Acute decompensated liver cirrhosis     Hospital Course:     68-year-old male with past medical history of alcoholic liver cirrhosis with stage II esophageal varices, prostate cancer,brought by EMS and found to have low blood sugar and bloody vomiting.  Patient was intubated due to altered mental status and severe metabolic acidosis.   He was admitted in February due to recurrent fall and encephalopathy and had cardiac arrest and new onset heart failure with NSTEMI and received stent. Blood glucose found to be low 27.  His vitals blood pressure 83/41, pulse 105, respirations 16 and temperature 90.9 °F.  Blood chemistry shows potassium 5.9 chloride 93 and bicarb 4 creatinine 6.8 BUN 40 lactic acid 23.5, pH shows severe acidotic..  His liver function test albumin 2.9 alk phos of 378,  and : Reviewed 323.  His hemoglobin 7.2 and platelet count 124.     He was admitted to MICU for eval and care.    Overnight, patient was started on CVVHD. Noted to be hypotensive, requiring 4 pressors (epinephrine, norepinephrine, vasopressin, phenylephrine). Given more fluid boluses and methylene blue. Noted low hgb 6.5, received 1 u pRBC. Repeat pRBC was 6.7, received 1 more unit pRBC. Per TEG results, FFP and cryo were transfused. Lactic acid noted to have some improvement from 23.5 to 20.   Code status was changed to DNRCCA by POA.

## 2024-03-13 NOTE — PROGRESS NOTES
Patient's unable to tolerate CRRT. CRRT stopped, patient's map-44, patient max on four pressors at this time.

## 2024-03-13 NOTE — CARE COORDINATION
Care Coordination: LOS 1 day. Recent admission 2/14/24-2/29/24- discharged to [facility] with Life vest. Readmitted 3/12/24 from nursing facility with AMS, hemoptysis. Admitted MICU, acute hypoxic resp failure, acute encephalopathy. Lactic 23.8 cr 7.4, initial H&H9.4, currently 6.7, Pt intubated, Na bicarb@ 150, Vasopressin, Fentanyl, Epinephrine, Levophed. GI consulted. Pt hx alcoholic cirrhosis portal htn, ascites, grade II esophageal varices. Code states changed to DNR-CCA.    Messaged Elena at [facility] to confirm pt was from their facility    Electronically signed by Claudia Bucio RN on 3/13/2024 at 9:52 AM

## 2024-03-13 NOTE — PLAN OF CARE
Problem: Discharge Planning  Goal: Discharge to home or other facility with appropriate resources  Outcome: Not Progressing     Problem: Pain  Goal: Verbalizes/displays adequate comfort level or baseline comfort level  Outcome: Not Progressing     Problem: Discharge Planning  Goal: Discharge to home or other facility with appropriate resources  Outcome: Not Progressing     Problem: Pain  Goal: Verbalizes/displays adequate comfort level or baseline comfort level  Outcome: Not Progressing     
Spoke with patient's primary decision maker, brother Vicente.   Vicente is unable to come into hospital this evening.   Discussed code status.  Decision made to change code status to DNR-CCA.   Code status confirmed by Dr. Bloom.   All questions answered.     Electronically signed by Yin Garvin MD on 3/12/2024 at 10:53 PM       
comfort level  3/13/2024 1212 by Harrison Villalta, RN  Outcome: Completed  3/13/2024 0238 by Letha Oliver, RN  Outcome: Not Progressing

## 2024-03-13 NOTE — H&P
resolved hospital problems. *      PLAN:    Septic shock: Source of infection could not be determined at this time, follow-up blood culture, on Levophed drip.  Under ICU care.  On meropenem and vancomycin now.  2.  Acute decompensated liver failure due to cirrhosis: He is hemoglobin is very high, probably hepatic encephalopathy, continue lactulose.  3.  Severe acidosis with BONNY: Nephrology consult appreciated, on bicarb drip now.  4.  Hemodialysis: His hemoglobin is 7.2 if it goes below 7 transfuse 1 unit PRBC.  Continue pantoprazole IV.    Prognosis poor    Code Status: Full, needs to talk with family member to determine the level of care  DVT prophylaxis: SCD      NOTE: This report was transcribed using voice recognition software. Every effort was made to ensure accuracy; however, inadvertent computerized transcription errors may be present.  Electronically signed by BRANDYN CLARKE MD on 3/12/2024 at 9:20 PM

## 2024-03-13 NOTE — PROGRESS NOTES
CBI urine output returning colorless, clear output. CBI shut off per Dr. Mohamud and urology will evaluate in the morning.

## 2024-03-13 NOTE — PROGRESS NOTES
[facility] signed off at this time. Reference number: [id number]. Will call if patient passes.

## 2024-03-13 NOTE — PROGRESS NOTES
[facility]  Department of Internal Medicine   Internal Medicine Residency   MICU Progress Note    Patient:  Shahzad Rodgers 68 y.o. male  MRN: [id number]     Date of Service: 3/13/2024    Allergy: Penicillins    Subjective   ICU day 15h    Overnight, patient was started on CVVHD. Noted to be hypotensive, requiring 4 pressors (epinephrine, norepinephrine, vasopressin, phenylephrine). Given more fluid boluses and methylene blue. Noted low hgb 6.5, received 1 u pRBC. Repeat pRBC was 6.7, received 1 more unit pRBC. Per TEG results, FFP and cryo were transfused. Lactic acid noted to have some improvement from 23.5 to 20.     Patient seen and examined this morning, intubated and on sedation. Noted to still be hypotensive, given albumin. ABG showed acidosis with low bicarbonate, still on bicarbonate drip. Given 4 more amps of bicarbonate. Respiratory rate increased. Due to persistent hypotension, CVVHD was held. Given 1 more dose methylene blue. Still noted to be hypotensive with MAP around 45.     During multidisciplinary rounds, patient had episode of V fib arrest with no ROSC achieved. Code status was DNR-CCA.     Objective     Physical Exam (prior to morning rounds):  General Appearance: Intubated and on sedation  Neck: no adenopathy, no carotid bruit, no JVD, and supple, symmetrical, trachea midline  Lung: clear to auscultation bilaterally  Heart: Tachycardic, regular rhythm, no murmurs  Abdomen: Soft abdomen, mildly distended  Extremities:  extremities normal, atraumatic, no cyanosis or edema  Musculoskeletal: No joint swelling, no muscle tenderness. ROM normal in all joints of extremities.   Neurologic: Mental status: Intubated and on sedation    Medications     Infusions: (Fluid, Sedation, Vasopressors)  Scheduled Meds:   albumin human 25%  25 g IntraVENous Q1H    thiamine  100 mg IntraVENous Once    calcium gluconate  2,000 mg IntraVENous Once    sodium chloride  500 mL IntraVENous Once    sodium

## 2024-03-13 NOTE — PROCEDURES
Temporary Hemoaccess Line Insertion     Procedure: right internal jugular vein double-lumen catheter placement.    Indications: HEMODIALYSIS    Consent: The family members were counseled regarding the procedure, its indications, risks, potential complications and alternatives, and any questions were answered. Consent was obtained to proceed.    Number of sticks: 1    Number of Kits used: 1    Procedure: Time Out: Immediately prior to the procedure a \"timeout\" was called to verify the correct patient and procedure. The patient was placed in the supine position and the skin over the right internal jugular vein was prepped with betadine and draped in a sterile fashion. Local anesthesia was obtained by infiltration using 1% Lidocaine without epinephrine.  With ultrasound guidance a large bore needle was used to identify the vein, dark non pulsatile blood returned. The guide wire was then inserted through the needle with minimal resistance. Guidewire placement was confirmed with ultrasound. A 2 mm nick was then made in the skin beside the guidewire. Then a dilator was inserted and removed. A second, larger dilator was then inserted and removed easily. A double lumen catheter was then inserted into the vessel over the guide wire using the Seldinger technique to the 24 cm shun.  All ports showed good, free flowing blood return and were flushed with saline solution.  Both ports were then clamped and capped. The catheter was then securely fastened to the skin with sutures and covered with a bio patch and sterile dressing.  A post procedure X-ray was ordered and showed good line position.    Complications: None   The patient tolerated the procedure well.    Estimated blood loss: 10 ml.    Raymundo Bloom MD PGY-3  3/12/2024  8:35 PM

## 2024-03-13 NOTE — PROGRESS NOTES
Patient's brother Vicente davis [phone] states [facility]  home in [facility], awaiting for family to confirm. Vicente aware patient will be placed in the morgue and to contact MICU with information regarding  home.

## 2024-03-13 NOTE — DEATH NOTES
Death Pronouncement Note  Patient's Name: Shahzad Rodgers   Patient's YOB: 1955  MRN Number: [id number]    Admitting Provider: Thuy Madsen MD  Attending Provider: Donn Em MD    Called to bedside to pronounce death after patient was noted to be in asystole at 10:26 AM 3/13/2024.   NO pupillary, corneal, doll's eye or gag reflex noted.  NO heart sounds or pulse noted.  NO Chest rise or Lung sounds noted.   Time of death declared at 10:26 AM.  Latisha Carlisle MD PGY-1  3/13/2024  11:38 AM

## 2024-03-13 NOTE — PROGRESS NOTES
Blood bank called and said they have to order platelets from [place]. If platelets need to be given soon they can get a pathology override. Residents aware.

## 2024-03-13 NOTE — CARE COORDINATION
Case Management Assessment  Initial Evaluation    Date/Time of Evaluation: 3/13/2024 9:58 AM  Assessment Completed by: Claudia Bucio RN    If patient is discharged prior to next notation, then this note serves as note for discharge by case management.    Patient Name: Shahzad Davis                   YOB: 1955  Diagnosis: Septic shock (HCC) [A41.9, R65.21]                   Date / Time: 3/12/2024 10:28 AM    Patient Admission Status: Inpatient   Readmission Risk (Low < 19, Mod (19-27), High > 27): Readmission Risk Score: 23.6    Current PCP: Fly Sequeira MD  PCP verified by CM? Yes    Chart Reviewed: Yes      History Provided by: Medical Record  Patient Orientation: Other (see comment) (intubated, pressors)    Patient Cognition: Other (see comment) (intubated, pressors)    Hospitalization in the last 30 days (Readmission):  Yes    If yes, Readmission Assessment in  Navigator will be completed.    Advance Directives:      Code Status: DNR-CCA   Patient's Primary Decision Maker is: Legal Next of Kin    Primary Decision Maker: migdalia davis - Brother/Sister - [phone]    Discharge Planning:    Patient lives with:   Type of Home:    Primary Care Giver: Other (Comment) (skilled nursing facility)  Patient Support Systems include: Family Members   Current Financial resources:    Current community resources:    Current services prior to admission:              Current DME:              Type of Home Care services:       ADLS  Prior functional level: Assistance with the following:, Mobility, Shopping, Housework, Cooking, Bathing  Current functional level: Other (see comment) (Intubated in ICU)    PT AM-PAC:   /24  OT AM-PAC:   /24    Family can provide assistance at DC: No  Would you like Case Management to discuss the discharge plan with any other family members/significant others, and if so, who? Yes (next of kin)  Plans to Return to Present Housing: Unknown at present  Other Identified Issues/Barriers to

## 2024-03-13 NOTE — CONSULTS
Department of Internal Medicine  Nephrology Attending Consult Note     Patient .  No need for a consult.   
[facility]   Gastroenterology, Hepatology, &  Advanced Endoscopy    Consult       ASSESSMENT AND PLAN:  Patient has alcoholic cirrhosis Portal HTN, ascites    Treat as septic shock based on initial evaluation.    Elevated LFT's in a EtOH pattern  Negative hepatic work up serology 9/14/23  -SPA 25 mg IV TID, Hold once  Albumin >3.5  -Supportive care  - Monitor LFT trend  -Lasix currently held  -2/26/24 Paracentesis with fluids for analysis. A total volume of  6100mL was withdrawn.  -Continue with standing order of paracentesis q 2 weeks as OP.    Hepatic encephalopathy in the setting of EtOH liver disease  -Lactulose q 2 hours until diarrhea, then reduce to QID     Anemia  - Monitor CBC  -Monitor gross bleed  -Transfuse to keep Hgb >7.0, currently 9.4  - Protonix 40 mg  -Urology consult suggested, significant hematuria      Previous EGD  Impression: Grade II esophageal varices with no associated high risk features. Z-line regular. Portal hypertensive gastropathy with focal areas of low-grade active hemorrhage. Treated with bipolar cautery. Normal antrum. Normal examined duodenum. No specimens collected.      HISTORY OF PRESENT ILLNESS:      Unable to obtain from patient will try again in am. Pt well known to our practice with EtOH liver disease needed q 2 week paracentesis.      Past Medical History:        Diagnosis Date    Alcoholism (HCC)     Prostate cancer (HCC)      Past Surgical History:        Procedure Laterality Date    CARDIAC PROCEDURE N/A 02/23/2024    Left heart cath / coronary angiography performed by Arvind Lowe DO at [facility]    CARDIAC PROCEDURE N/A 02/23/2024    Percutaneous coronary intervention performed by Arvind Lowe DO at [facility]ARDI[facility] C[facility]    COLONOSCOPY N/A 01/16/2024    COLORECTAL CANCER SCREENING, NOT HIGH RISK performed by Leroy Hernandez MD at [facility] ENDOSCOPY    PARACENTESIS Right 09/22/2023    6200mL clear yellow fluid removed    PARACENTESIS Right 
nasal spray 1 spray by Nasal route as needed for Congestion 2/27/24   Celia Garcia MD   phosphorus (K PHOS NEUTRAL) [id number] MG tablet Take 1 tablet by mouth 2 times daily 2/27/24   Celia Garcia MD   clopidogrel (PLAVIX) 75 MG tablet Take 1 tablet by mouth daily 2/28/24   Celia Garcia MD   levETIRAcetam (KEPPRA) 750 MG tablet Take 2 tablets by mouth 2 times daily 2/27/24   Celia Garcia MD   pantoprazole (PROTONIX) 40 MG tablet Take 1 tablet by mouth 2 times daily (before meals) 1/16/24   Leroy Hernandez MD   potassium chloride (KLOR-CON M) 20 MEQ extended release tablet Take 1 tablet by mouth daily 11/7/23   Snehal Mercado APRN - CNP     Allergies:  Penicillins    Social History:   TOBACCO:   reports that he has been smoking cigarettes. He started smoking about 54 years ago. He has a 27.1 pack-year smoking history. He has never used smokeless tobacco.  ETOH:   reports that he does not currently use alcohol.    Family History:       Problem Relation Age of Onset    Diabetes Mother      REVIEW OF SYSTEMS:  Unable to obtain accurate ROS due to patient's condition (intubated and sedated)     Physical Exam   Vitals: BP (!) 83/41   Pulse (!) 105   Temp (!) 90.9 °F (32.7 °C) (Rectal)   Resp 16   Wt 82.3 kg (181 lb 7 oz)   SpO2 100%   BMI 25.31 kg/m²     Arterial line BP: ABP (Arterial line BP): 169/47 ABP Mean (Arterial Line Mean): 75 mmHg    Physical Exam  Constitutional:       Appearance: He is ill-appearing.      Comments: Intubated and sedated   Eyes:      General: Scleral icterus present.      Comments: Pupils pinpoint   Cardiovascular:      Rate and Rhythm: Normal rate and regular rhythm.   Pulmonary:      Breath sounds: No wheezing or rales.   Abdominal:      General: Bowel sounds are normal. There is distension.      Palpations: Abdomen is soft. There is no mass.      Tenderness: There is no guarding.   Musculoskeletal:      Right lower leg: No edema.      Left lower leg:

## 2024-03-13 NOTE — ACP (ADVANCE CARE PLANNING)
Advance Care Planning   Healthcare Decision Maker:    Primary Decision Maker: migdalia davis - Brother/Sister - [phone]    Click here to complete Healthcare Decision Makers including selection of the Healthcare Decision Maker Relationship (ie \"Primary\").

## 2024-03-14 LAB
ACB COMPLEX DNA BLD POS QL NAA+NON-PROBE: NOT DETECTED
ARM BAND NUMBER: NORMAL
B FRAGILIS DNA BLD POS QL NAA+NON-PROBE: NOT DETECTED
BIOFIRE TEST COMMENT: ABNORMAL
BLOOD BANK BLOOD PRODUCT EXPIRATION DATE: NORMAL
BLOOD BANK BLOOD PRODUCT EXPIRATION DATE: NORMAL
BLOOD BANK DISPENSE STATUS: NORMAL
BLOOD BANK ISBT PRODUCT BLOOD TYPE: 7300
BLOOD BANK ISBT PRODUCT BLOOD TYPE: 7300
BLOOD BANK PRODUCT CODE: NORMAL
BLOOD BANK PRODUCT CODE: NORMAL
BLOOD BANK UNIT TYPE AND RH: NORMAL
BLOOD BANK UNIT TYPE AND RH: NORMAL
BPU ID: NORMAL
C ALBICANS DNA BLD POS QL NAA+NON-PROBE: NOT DETECTED
C AURIS DNA BLD POS QL NAA+NON-PROBE: NOT DETECTED
C GATTII+NEOFOR DNA BLD POS QL NAA+N-PRB: NOT DETECTED
C GLABRATA DNA BLD POS QL NAA+NON-PROBE: NOT DETECTED
C KRUSEI DNA BLD POS QL NAA+NON-PROBE: NOT DETECTED
C PARAP DNA BLD POS QL NAA+NON-PROBE: NOT DETECTED
C TROPICLS DNA BLD POS QL NAA+NON-PROBE: NOT DETECTED
COMPONENT: NORMAL
E CLOAC COMP DNA BLD POS NAA+NON-PROBE: NOT DETECTED
E COLI DNA BLD POS QL NAA+NON-PROBE: NOT DETECTED
E FAECALIS DNA BLD POS QL NAA+NON-PROBE: NOT DETECTED
E FAECIUM DNA BLD POS QL NAA+NON-PROBE: NOT DETECTED
ENTEROBACTERALES DNA BLD POS NAA+N-PRB: NOT DETECTED
GP B STREP DNA BLD POS QL NAA+NON-PROBE: NOT DETECTED
HAEM INFLU DNA BLD POS QL NAA+NON-PROBE: NOT DETECTED
K OXYTOCA DNA BLD POS QL NAA+NON-PROBE: NOT DETECTED
KLEBSIELLA SP DNA BLD POS QL NAA+NON-PRB: NOT DETECTED
KLEBSIELLA SP DNA BLD POS QL NAA+NON-PRB: NOT DETECTED
L MONOCYTOG DNA BLD POS QL NAA+NON-PROBE: NOT DETECTED
MICROORGANISM SPEC CULT: ABNORMAL
MICROORGANISM SPEC CULT: ABNORMAL
MICROORGANISM/AGENT SPEC: ABNORMAL
N MEN DNA BLD POS QL NAA+NON-PROBE: NOT DETECTED
P AERUGINOSA DNA BLD POS NAA+NON-PROBE: NOT DETECTED
PROTEUS SP DNA BLD POS QL NAA+NON-PROBE: NOT DETECTED
S AUREUS DNA BLD POS QL NAA+NON-PROBE: NOT DETECTED
S AUREUS+CONS DNA BLD POS NAA+NON-PROBE: NOT DETECTED
S EPIDERMIDIS DNA BLD POS QL NAA+NON-PRB: NOT DETECTED
S LUGDUNENSIS DNA BLD POS QL NAA+NON-PRB: NOT DETECTED
S MALTOPHILIA DNA BLD POS QL NAA+NON-PRB: NOT DETECTED
S MARCESCENS DNA BLD POS NAA+NON-PROBE: NOT DETECTED
S PNEUM DNA BLD POS QL NAA+NON-PROBE: NOT DETECTED
S PYO DNA BLD POS QL NAA+NON-PROBE: NOT DETECTED
SALMONELLA DNA BLD POS QL NAA+NON-PROBE: NOT DETECTED
SERVICE CMNT-IMP: ABNORMAL
SPECIMEN DESCRIPTION: ABNORMAL
SPECIMEN DESCRIPTION: ABNORMAL
STREPTOCOCCUS DNA BLD POS NAA+NON-PROBE: DETECTED
TRANSFUSION STATUS: NORMAL
UNIT DIVISION: 0
UNIT ISSUE DATE/TIME: NORMAL
UNIT ISSUE DATE/TIME: NORMAL

## 2024-03-16 LAB
ABO/RH: NORMAL
ANTIBODY SCREEN: NEGATIVE
ARM BAND NUMBER: NORMAL
BLOOD BANK BLOOD PRODUCT EXPIRATION DATE: NORMAL
BLOOD BANK BLOOD PRODUCT EXPIRATION DATE: NORMAL
BLOOD BANK DISPENSE STATUS: NORMAL
BLOOD BANK ISBT PRODUCT BLOOD TYPE: 7300
BLOOD BANK ISBT PRODUCT BLOOD TYPE: 7300
BLOOD BANK PRODUCT CODE: NORMAL
BLOOD BANK PRODUCT CODE: NORMAL
BLOOD BANK SAMPLE EXPIRATION: NORMAL
BLOOD BANK UNIT TYPE AND RH: NORMAL
BLOOD BANK UNIT TYPE AND RH: NORMAL
BPU ID: NORMAL
COMPONENT: NORMAL
CROSSMATCH RESULT: NORMAL
TRANSFUSION STATUS: NORMAL
UNIT DIVISION: 0
UNIT ISSUE DATE/TIME: NORMAL
UNIT ISSUE DATE/TIME: NORMAL

## 2024-03-17 LAB
MICROORGANISM SPEC CULT: NORMAL
SERVICE CMNT-IMP: NORMAL
SPECIMEN DESCRIPTION: NORMAL

## 2024-03-21 ASSESSMENT — ENCOUNTER SYMPTOMS
RHINORRHEA: 0
TROUBLE SWALLOWING: 0
EYE REDNESS: 0
SINUS PRESSURE: 0
SHORTNESS OF BREATH: 0
EYE ITCHING: 0
WHEEZING: 0
SINUS PAIN: 0
EYE DISCHARGE: 0
GASTROINTESTINAL NEGATIVE: 1
SORE THROAT: 0

## 2024-03-21 ASSESSMENT — VISUAL ACUITY: OU: 1

## 2024-03-21 NOTE — PROGRESS NOTES
long-term treatment options. Reviewed with the patient the appropriate state-specific advance directive documents. Reviewed the process of designating a competent adult as an Agent (or -in-fact) that could take make health care decisions for the patient if incompetent. Patient was asked to complete the declaration forms, if they have not already, either acknowledge the forms by a public notary or an eligible witness and provide a signed copy to the practice office. Per patient he is a full code     Time spent (minutes): 17        Aura KAHN MA  am transcribing for Dr. MAIK Henry I, Estelle Henry MD, personally performed the services described in this documentation as scribed by Aura Tesfaye and it is both accurate and complete

## 2024-04-01 ASSESSMENT — ENCOUNTER SYMPTOMS
EYE REDNESS: 0
VOICE CHANGE: 0
GASTROINTESTINAL NEGATIVE: 1
RHINORRHEA: 0
SORE THROAT: 0
SHORTNESS OF BREATH: 0
COUGH: 0
SINUS PRESSURE: 0
WHEEZING: 0
EYE ITCHING: 0
SINUS PAIN: 0
TROUBLE SWALLOWING: 0

## 2024-04-01 NOTE — PROGRESS NOTES
is no guarding or rebound.      Hernia: No hernia is present.   Musculoskeletal:         General: Normal range of motion.      Right shoulder: Normal.      Left shoulder: Normal.      Right upper arm: Normal.      Left upper arm: Normal.      Cervical back: Normal range of motion.      Right hip: Normal.      Left hip: Normal.      Right upper leg: Normal.      Left upper leg: Normal.      Right lower leg: Normal.      Left lower leg: Normal.      Right foot: Normal.      Left foot: Normal.   Lymphadenopathy:      Comments: No palpable or visible regional lymphadenopathy   Skin:     General: Skin is warm and dry.      Findings: No bruising, ecchymosis, lesion or rash.   Neurological:      General: No focal deficit present.      Mental Status: He is alert. Mental status is at baseline.      Cranial Nerves: No cranial nerve deficit.      Deep Tendon Reflexes: Reflexes normal.   Psychiatric:         Mood and Affect: Mood and affect normal. Mood is not depressed.         Behavior: Behavior normal. Behavior is not agitated.         Cognition and Memory: Cognition and memory normal.      Comments: No apparent anxiety         Assessment & Plan:  1. Alcoholic cirrhosis of liver with ascites (HCC)  2. ST elevation myocardial infarction involving left main coronary artery (HCC)  3. Weakness  4. Bleeding esophageal varices, unspecified esophageal varices type (HCC)  5. Chronic systolic heart failure (HCC)  6. Coronary artery disease involving native coronary artery of native heart without angina pectoris  7. Primary cardiomyopathy (HCC)       Chart reviewed   medications reviewed   Working with therapy improving slowly   Ascites will monitor   Liver cirrhosis monitor  ammonia level   Chf stable   Cad no chest pain   Monitor labs   Continue current treatment           Aura KAHN MA  am transcribing for Estelle Croft MD, personally performed the services described in this documentation as

## 2024-04-07 PROBLEM — N17.9 ACUTE RENAL FAILURE (HCC): Status: ACTIVE | Noted: 2024-04-07

## (undated) DEVICE — GLIDESHEATH SLENDER ACCESS KIT: Brand: GLIDESHEATH SLENDER

## (undated) DEVICE — SNARE VASC L240CM LOOP W10MM SHTH DIA2.4MM RND STIFF CLD

## (undated) DEVICE — KIT ANGIO W/ AT P65 PREM HND CTRL FOR CNTRST DEL ANGIOTOUCH

## (undated) DEVICE — ANGIOGRAPHIC CATHETER: Brand: EXPO™

## (undated) DEVICE — CONTAINER SPEC 60ML PH 7NEUTRAL BUFF FRMLN RDY TO USE

## (undated) DEVICE — PAD, DEFIB, ADULT, RADIOTRAN, PHYSIO, LO: Brand: MEDLINE

## (undated) DEVICE — CANNULA NSL CANN NSL L25FT TBNG AD O2 SUP SFT UC

## (undated) DEVICE — CATHETER GUID 6FR L100CM DIA0.071IN NYL SHFT EBU3.5

## (undated) DEVICE — PACK SURG CARDIAC CATH

## (undated) DEVICE — FORCEPS BX L240CM JAW DIA2.4MM ORNG L CAP W/ NDL DISP RAD

## (undated) DEVICE — COPILOT BLEEDBACK CONTROL VALVE: Brand: COPILOT

## (undated) DEVICE — DEFENDO AIR WATER SUCTION AND BIOPSY VALVE KIT FOR  OLYMPUS: Brand: DEFENDO AIR/WATER/SUCTION AND BIOPSY VALVE

## (undated) DEVICE — TUBING PRSS MON L12IN PVC RIG NONEXPANDING M TO FEM CONN

## (undated) DEVICE — GUIDEWIRE WITH ICE™ HYDROPHILIC COATING: Brand: LUGE™

## (undated) DEVICE — CONNECTOR IRRIGATION AUXILIARY H2O JET W/ PRT MTL THRD HYDR

## (undated) DEVICE — BIPOLAR ELECTROHEMOSTASIS CATHETER: Brand: INJECTION GOLD PROBE

## (undated) DEVICE — KIT MFLD ISOLATN NACL CNTRST PRT TBNG SPIK W/ PRSS TRNSDUC

## (undated) DEVICE — CATH BLLN ANGIO 2.50X12MM SC EUPHORA RX

## (undated) DEVICE — TRAP POLYP ETRAP

## (undated) DEVICE — GAUZE,SPONGE,4"X4",8PLY,STRL,LF,10/TRAY: Brand: MEDLINE

## (undated) DEVICE — ANGIOPLASTY ADD-ON PACK: Brand: MEDLINE INDUSTRIES, INC.

## (undated) DEVICE — CATHETER GUIDE EXTN 6 FRX150 CM HYDRPHLC COAT TELSCP

## (undated) DEVICE — STENT ONYXNG25008UX ONYX 2.50X08RX
Type: IMPLANTABLE DEVICE | Site: CORONARY | Status: NON-FUNCTIONAL
Brand: ONYX FRONTIER™

## (undated) DEVICE — SYRINGE MED 50ML LUERSLIP TIP

## (undated) DEVICE — CATHETER GUID 6FR L100CM DIA0.071IN NYL SHFT EBU4.0 W/O

## (undated) DEVICE — BAND COMPR L24CM REG CLR PLAS HEMSTAT EXT HK AND LOOP RETEN

## (undated) DEVICE — INFLATION DEVICE KIT: Brand: ENCORE™ 26 ADVANTAGE KIT

## (undated) DEVICE — CATH BLLN ANGIO 3X6MM NC EUPHORIA RX

## (undated) DEVICE — RADIFOCUS OPTITORQUE ANGIOGRAPHIC CATHETER: Brand: OPTITORQUE

## (undated) DEVICE — CATH BLLN ANGIO 2.75X8MM NC EUPHORIA RX

## (undated) DEVICE — BITEBLOCK 54FR W/ DENT RIM BLOX